# Patient Record
Sex: FEMALE | Race: WHITE | NOT HISPANIC OR LATINO | Employment: OTHER | URBAN - METROPOLITAN AREA
[De-identification: names, ages, dates, MRNs, and addresses within clinical notes are randomized per-mention and may not be internally consistent; named-entity substitution may affect disease eponyms.]

---

## 2017-01-18 ENCOUNTER — TELEPHONE (OUTPATIENT)
Dept: MEDICAL GROUP | Facility: MEDICAL CENTER | Age: 77
End: 2017-01-18

## 2017-01-18 NOTE — TELEPHONE ENCOUNTER
Left message for patient to call back regarding NP pre-visit planning. Please transfer call to [phone].

## 2017-01-25 ENCOUNTER — OFFICE VISIT (OUTPATIENT)
Dept: MEDICAL GROUP | Facility: MEDICAL CENTER | Age: 77
End: 2017-01-25
Payer: MEDICARE

## 2017-01-25 VITALS
DIASTOLIC BLOOD PRESSURE: 82 MMHG | WEIGHT: 191 LBS | RESPIRATION RATE: 16 BRPM | OXYGEN SATURATION: 92 % | BODY MASS INDEX: 32.61 KG/M2 | TEMPERATURE: 97.8 F | HEIGHT: 64 IN | SYSTOLIC BLOOD PRESSURE: 144 MMHG | HEART RATE: 94 BPM

## 2017-01-25 DIAGNOSIS — E78.5 DYSLIPIDEMIA: ICD-10-CM

## 2017-01-25 DIAGNOSIS — E66.09 NON MORBID OBESITY DUE TO EXCESS CALORIES: ICD-10-CM

## 2017-01-25 DIAGNOSIS — F33.9 EPISODE OF RECURRENT MAJOR DEPRESSIVE DISORDER, UNSPECIFIED DEPRESSION EPISODE SEVERITY (HCC): ICD-10-CM

## 2017-01-25 DIAGNOSIS — R42 VERTIGO: ICD-10-CM

## 2017-01-25 DIAGNOSIS — J44.9 CHRONIC OBSTRUCTIVE PULMONARY DISEASE, UNSPECIFIED COPD TYPE (HCC): ICD-10-CM

## 2017-01-25 DIAGNOSIS — E03.9 ACQUIRED HYPOTHYROIDISM: ICD-10-CM

## 2017-01-25 DIAGNOSIS — M25.512 CHRONIC LEFT SHOULDER PAIN: ICD-10-CM

## 2017-01-25 DIAGNOSIS — R73.02 IGT (IMPAIRED GLUCOSE TOLERANCE): ICD-10-CM

## 2017-01-25 DIAGNOSIS — I10 ESSENTIAL HYPERTENSION: ICD-10-CM

## 2017-01-25 DIAGNOSIS — G30.9 ALZHEIMER'S DEMENTIA WITHOUT BEHAVIORAL DISTURBANCE, UNSPECIFIED TIMING OF DEMENTIA ONSET: ICD-10-CM

## 2017-01-25 DIAGNOSIS — G89.29 CHRONIC LEFT SHOULDER PAIN: ICD-10-CM

## 2017-01-25 DIAGNOSIS — F02.80 ALZHEIMER'S DEMENTIA WITHOUT BEHAVIORAL DISTURBANCE, UNSPECIFIED TIMING OF DEMENTIA ONSET: ICD-10-CM

## 2017-01-25 PROCEDURE — 4040F PNEUMOC VAC/ADMIN/RCVD: CPT | Performed by: INTERNAL MEDICINE

## 2017-01-25 PROCEDURE — G8482 FLU IMMUNIZE ORDER/ADMIN: HCPCS | Performed by: INTERNAL MEDICINE

## 2017-01-25 PROCEDURE — G8419 CALC BMI OUT NRM PARAM NOF/U: HCPCS | Performed by: INTERNAL MEDICINE

## 2017-01-25 PROCEDURE — 1101F PT FALLS ASSESS-DOCD LE1/YR: CPT | Mod: 8P | Performed by: INTERNAL MEDICINE

## 2017-01-25 PROCEDURE — 1036F TOBACCO NON-USER: CPT | Performed by: INTERNAL MEDICINE

## 2017-01-25 PROCEDURE — G8432 DEP SCR NOT DOC, RNG: HCPCS | Performed by: INTERNAL MEDICINE

## 2017-01-25 PROCEDURE — 99204 OFFICE O/P NEW MOD 45 MIN: CPT | Performed by: INTERNAL MEDICINE

## 2017-01-25 RX ORDER — FOLIC ACID 1 MG/1
1 TABLET ORAL DAILY
Qty: 90 TAB | Refills: 3 | Status: SHIPPED | OUTPATIENT
Start: 2017-01-25 | End: 2018-02-19 | Stop reason: SDUPTHER

## 2017-01-25 RX ORDER — AMLODIPINE BESYLATE 10 MG/1
10 TABLET ORAL DAILY
Qty: 90 TAB | Refills: 3 | Status: SHIPPED | OUTPATIENT
Start: 2017-01-25 | End: 2017-01-25 | Stop reason: SDUPTHER

## 2017-01-25 RX ORDER — CITALOPRAM 20 MG/1
20 TABLET ORAL DAILY
Qty: 90 TAB | Refills: 3 | Status: SHIPPED | OUTPATIENT
Start: 2017-01-25 | End: 2017-01-25 | Stop reason: SDUPTHER

## 2017-01-25 RX ORDER — METOPROLOL SUCCINATE 100 MG/1
100 TABLET, EXTENDED RELEASE ORAL DAILY
Qty: 90 TAB | Refills: 3 | Status: SHIPPED | OUTPATIENT
Start: 2017-01-25 | End: 2018-01-29 | Stop reason: SDUPTHER

## 2017-01-25 RX ORDER — MECLIZINE HYDROCHLORIDE 25 MG/1
25 TABLET ORAL 3 TIMES DAILY PRN
Qty: 90 TAB | Refills: 0 | Status: ON HOLD | OUTPATIENT
Start: 2017-01-25 | End: 2018-12-19

## 2017-01-25 RX ORDER — BUDESONIDE AND FORMOTEROL FUMARATE DIHYDRATE 160; 4.5 UG/1; UG/1
1 AEROSOL RESPIRATORY (INHALATION) 2 TIMES DAILY
Qty: 1 INHALER | Refills: 6 | Status: SHIPPED | OUTPATIENT
Start: 2017-01-25 | End: 2017-04-03 | Stop reason: SDUPTHER

## 2017-01-25 RX ORDER — LEVOTHYROXINE SODIUM 0.12 MG/1
125 TABLET ORAL
Qty: 90 TAB | Refills: 3 | Status: SHIPPED | OUTPATIENT
Start: 2017-01-25 | End: 2017-01-25 | Stop reason: SDUPTHER

## 2017-01-25 RX ORDER — MECLIZINE HYDROCHLORIDE 25 MG/1
25 TABLET ORAL 3 TIMES DAILY PRN
Qty: 90 TAB | Refills: 0 | Status: SHIPPED | OUTPATIENT
Start: 2017-01-25 | End: 2017-01-25 | Stop reason: SDUPTHER

## 2017-01-25 RX ORDER — ENALAPRIL MALEATE 20 MG/1
20 TABLET ORAL 2 TIMES DAILY
Qty: 180 TAB | Refills: 3 | Status: SHIPPED | OUTPATIENT
Start: 2017-01-25 | End: 2019-08-13

## 2017-01-25 RX ORDER — AMLODIPINE BESYLATE 10 MG/1
10 TABLET ORAL DAILY
Qty: 90 TAB | Refills: 3 | Status: SHIPPED | OUTPATIENT
Start: 2017-01-25 | End: 2018-04-16 | Stop reason: SDUPTHER

## 2017-01-25 RX ORDER — ALBUTEROL SULFATE 2.5 MG/3ML
2.5 SOLUTION RESPIRATORY (INHALATION) EVERY 4 HOURS PRN
Qty: 300 ML | Refills: 6 | Status: SHIPPED | OUTPATIENT
Start: 2017-01-25 | End: 2018-02-19 | Stop reason: SDUPTHER

## 2017-01-25 RX ORDER — ENALAPRIL MALEATE 20 MG/1
20 TABLET ORAL 2 TIMES DAILY
Qty: 180 TAB | Refills: 3 | Status: SHIPPED | OUTPATIENT
Start: 2017-01-25 | End: 2017-01-25 | Stop reason: SDUPTHER

## 2017-01-25 RX ORDER — PRAVASTATIN SODIUM 40 MG
40 TABLET ORAL DAILY
Qty: 90 TAB | Refills: 3 | Status: SHIPPED | OUTPATIENT
Start: 2017-01-25 | End: 2017-01-25 | Stop reason: SDUPTHER

## 2017-01-25 RX ORDER — FOLIC ACID 1 MG/1
1 TABLET ORAL DAILY
Qty: 90 TAB | Refills: 3 | Status: SHIPPED | OUTPATIENT
Start: 2017-01-25 | End: 2017-01-25 | Stop reason: SDUPTHER

## 2017-01-25 RX ORDER — METOPROLOL SUCCINATE 100 MG/1
100 TABLET, EXTENDED RELEASE ORAL DAILY
Qty: 90 TAB | Refills: 3 | Status: SHIPPED | OUTPATIENT
Start: 2017-01-25 | End: 2017-01-25 | Stop reason: SDUPTHER

## 2017-01-25 RX ORDER — PRAVASTATIN SODIUM 40 MG
40 TABLET ORAL DAILY
Qty: 90 TAB | Refills: 3 | Status: SHIPPED | OUTPATIENT
Start: 2017-01-25 | End: 2018-01-22 | Stop reason: SDUPTHER

## 2017-01-25 RX ORDER — LEVOTHYROXINE SODIUM 0.12 MG/1
125 TABLET ORAL
Qty: 90 TAB | Refills: 3 | Status: SHIPPED | OUTPATIENT
Start: 2017-01-25 | End: 2018-02-26 | Stop reason: SDUPTHER

## 2017-01-25 RX ORDER — CITALOPRAM 20 MG/1
20 TABLET ORAL DAILY
Qty: 90 TAB | Refills: 3 | Status: SHIPPED | OUTPATIENT
Start: 2017-01-25 | End: 2018-01-15 | Stop reason: SDUPTHER

## 2017-01-25 NOTE — PROGRESS NOTES
CC: Establishing care multiple medical problems.    HPI:   [place] presents today with the following.    1. Essential hypertension  Presents with a history of hypertension maintain on blood pressure medication slightly elevated in office today. She is denying any chest pain or shortness of breath.    2. Chronic left shoulder pain  She does have chronic left shoulder pain for which she takes anti-inflammatory is but does not report daily pain but she takes medication every day.    3. Alzheimer's dementia without behavioral disturbance, unspecified timing of dementia onset  Reports suffering from Alzheimer's having been followed by neurology in [place] like to establish here. Currently not on medications but some problems with short-term memory.    4. Acquired hypothyroidism  Maintain on thyroid medication requesting refill.    5. Dyslipidemia  Also reports a history of dyslipidemia no recent blood work    6. Vertigo  She does suffer from vertigo taking meclizine when necessary.    7. Episode of recurrent major depressive disorder, unspecified depression episode severity (CMS-HCC)  She reports her mood is somewhat depressed maintain on medication which has been states is been significantly helpful.    8. Chronic obstructive pulmonary disease, unspecified COPD type (CMS-[facility])  History of COPD maintained on inhalers needing refills denying any acute shortness of breath    9. Non morbid obesity due to excess calories  Weight certainly elevated spin this way for some time difficulty with activity.    10. IGT (impaired glucose tolerance)  She reports a history of elevated blood sugars in the past but not truly diabetic.      Patient Active Problem List    Diagnosis Date Noted   • Essential hypertension 01/25/2017   • Chronic left shoulder pain 01/25/2017   • Alzheimer's dementia without behavioral disturbance 01/25/2017   • Acquired hypothyroidism 01/25/2017   • Dyslipidemia 01/25/2017   • Episode of recurrent major  "depressive disorder (CMS-HCC) 01/25/2017   • Chronic obstructive pulmonary disease (CMS-HCC) 01/25/2017   • Non morbid obesity due to excess calories 01/25/2017   • IGT (impaired glucose tolerance) 01/25/2017       Current Outpatient Prescriptions   Medication Sig Dispense Refill   • amlodipine (NORVASC) 10 MG Tab Take 1 Tab by mouth every day. 90 Tab 3   • folic acid (FOLVITE) 1 MG Tab Take 1 Tab by mouth every day. 90 Tab 3   • levothyroxine (SYNTHROID) 125 MCG Tab Take 1 Tab by mouth Every morning on an empty stomach. 90 Tab 3   • pravastatin (PRAVACHOL) 40 MG tablet Take 1 Tab by mouth every day. 90 Tab 3   • metoprolol SR (TOPROL XL) 100 MG TABLET SR 24 HR Take 1 Tab by mouth every day. 90 Tab 3   • citalopram (CELEXA) 20 MG Tab Take 1 Tab by mouth every day. 90 Tab 3   • enalapril (VASOTEC) 20 MG tablet Take 1 Tab by mouth 2 times a day. 180 Tab 3   • meclizine (ANTIVERT) 25 MG Tab Take 1 Tab by mouth 3 times a day as needed. 90 Tab 0   • budesonide-formoterol (SYMBICORT) 160-4.5 MCG/ACT Aerosol Inhale 1 Puff by mouth 2 Times a Day. 1 Inhaler 6   • ipratropium (ATROVENT) 0.02 % Solution 1 mL by Nebulization route 2 times a day. 100 Vial 6   • albuterol (PROVENTIL) 2.5mg/3ml Nebu Soln solution for nebulization 3 mL by Nebulization route every four hours as needed for Shortness of Breath. 300 mL 6   • PROAIR  (90 BASE) MCG/ACT Aero Soln inhalation aerosol Inhale 2 Puffs by mouth every 6 hours as needed for Shortness of Breath. 1 Inhaler 3     No current facility-administered medications for this visit.         Allergies as of 01/25/2017   • (Not on File)        ROS: As per HPI.    /82 mmHg  Pulse 94  Temp(Src) 36.6 °C (97.8 °F)  Resp 16  Ht 1.626 m (5' 4\")  Wt 86.637 kg (191 lb)  BMI 32.77 kg/m2  SpO2 92%    Physical Exam:  Gen:         Alert and oriented, No apparent distress.  Neck:        No Lymphadenopathy or Bruits.  Lungs:     Clear to auscultation bilaterally  CV:          Regular rate " and rhythm. No murmurs, rubs or gallops.               Ext:          No clubbing, cyanosis, edema.      Assessment and Plan.   76 y.o. female with the following issues.    1. Essential hypertension  Currently well controlled, Discuss diet, exercise and salt restriction.    2. Chronic left shoulder pain  Discontinuing meloxicam will see back in the coming weeks to see how much for probable shoulder is.    3. Alzheimer's dementia without behavioral disturbance, unspecified timing of dementia onset  She does have difficulty with word finding and short-term recall referring back to neurology will get previous records  - REFERRAL TO NEUROLOGY    4. Acquired hypothyroidism  Recheck thyroid  - TSH; Future    5. Dyslipidemia  Checking cholesterol  - COMP METABOLIC PANEL; Future  - LIPID PROFILE; Future    6. Vertigo  Refilled meclizine.  - CBC WITH DIFFERENTIAL; Future  - VITAMIN B12; Future  - FOLATE; Future    7. Episode of recurrent major depressive disorder, unspecified depression episode severity (CMS-HCC)  Mood somewhat stable refilled Celexa.    8. Chronic obstructive pulmonary disease, unspecified COPD type (CMS-HCC)  No acute issues will get previous records refilled inhalers.    9. Non morbid obesity due to excess calories  Discussed diet exercise and weight loss strategies. Have set a goal for 15 pounds in 3 months and will followup at that time.  - Patient identified as having weight management issue.  Appropriate orders and counseling given.    10. IGT (impaired glucose tolerance)  Rechecking blood work.  - HEMOGLOBIN A1C; Future

## 2017-01-25 NOTE — MR AVS SNAPSHOT
"        [place] Ernst   2017 9:00 AM   Office Visit   MRN: [id number]    Department:  [address] [place] [address]   Dept Phone:  [phone]    Description:  Female : 1940   Provider:  Orestes Warren M.D.           Reason for Visit     Establish Care COPD      Allergies as of 2017     Not on File      You were diagnosed with     Essential hypertension   [4023658]       Chronic left shoulder pain   [980510]       Alzheimer's dementia without behavioral disturbance, unspecified timing of dementia onset   [5581712]       Acquired hypothyroidism   [0408884]       Dyslipidemia   [560416]       Vertigo   [266270]       Episode of recurrent major depressive disorder, unspecified depression episode severity (CMS-HCC)   [9893064]       Chronic obstructive pulmonary disease, unspecified COPD type (CMS-HCC)   [5090768]       Non morbid obesity due to excess calories   [4303853]       IGT (impaired glucose tolerance)   [102176]         Vital Signs     Blood Pressure Pulse Temperature Respirations Height Weight    144/82 mmHg 94 36.6 °C (97.8 °F) 16 1.626 m (5' 4\") 86.637 kg (191 lb)    Body Mass Index Oxygen Saturation Smoking Status             32.77 kg/m2 92% Never Smoker          Basic Information     Date Of Birth Sex Race Ethnicity Preferred Language    1940 Female Patient Refused Patient Refused English      Your appointments     2017  8:40 AM   Established Patient with Orestes Warren M.D.   [address] ([address])    [address] [place] [id number]   [phone]           You will be receiving a confirmation call a few days before your appointment from our automated call confirmation system.              Problem List              ICD-10-CM Priority Class Noted - Resolved    Essential hypertension I10   2017 - Present    Chronic left shoulder pain M25.512, G89.29   2017 - Present    Alzheimer's dementia without behavioral disturbance G30.9, F02.80   " 1/25/2017 - Present    Acquired hypothyroidism E03.9   1/25/2017 - Present    Dyslipidemia E78.5   1/25/2017 - Present    Episode of recurrent major depressive disorder (CMS-HCC) F33.9   1/25/2017 - Present    Chronic obstructive pulmonary disease (CMS-HCC) J44.9   1/25/2017 - Present    Non morbid obesity due to excess calories E66.09   1/25/2017 - Present    IGT (impaired glucose tolerance) R73.02   1/25/2017 - Present      Health Maintenance        Date Due Completion Dates    IMM DTaP/Tdap/Td Vaccine (1 - Tdap) 5/21/1957 ---    MAMMOGRAM 5/21/1978 ---    IMM ZOSTER VACCINE 5/21/1998 ---    BONE DENSITY 5/21/2003 ---    IMM INFLUENZA (1) 9/1/2016 10/13/2014, 9/14/2013    IMM PNEUMOCOCCAL 65+ (ADULT) LOW/MEDIUM RISK SERIES (2 of 2 - PPSV23) 6/23/2017 6/23/2016            Current Immunizations     13-VALENT PCV PREVNAR 6/23/2016    Influenza Vaccine Adult HD 9/25/2016    Influenza Vaccine Quad Inj (Preserved) 10/13/2014, 9/14/2013      Below and/or attached are the medications your provider expects you to take. Review all of your home medications and newly ordered medications with your provider and/or pharmacist. Follow medication instructions as directed by your provider and/or pharmacist. Please keep your medication list with you and share with your provider. Update the information when medications are discontinued, doses are changed, or new medications (including over-the-counter products) are added; and carry medication information at all times in the event of emergency situations     Allergies:  No Known Allergies          Medications  Valid as of: January 25, 2017 -  9:43 AM    Generic Name Brand Name Tablet Size Instructions for use    Albuterol Sulfate (Nebu Soln) PROVENTIL 2.5mg/3ml 3 mL by Nebulization route every four hours as needed for Shortness of Breath.        Albuterol Sulfate (Aero Soln) PROAIR  (90 BASE) MCG/ACT Inhale 2 Puffs by mouth every 6 hours as needed for Shortness of Breath.           AmLODIPine Besylate (Tab) NORVASC 10 MG Take 1 Tab by mouth every day.        Budesonide-Formoterol Fumarate (Aerosol) SYMBICORT 160-4.5 MCG/ACT Inhale 1 Puff by mouth 2 Times a Day.        Citalopram Hydrobromide (Tab) CELEXA 20 MG Take 1 Tab by mouth every day.        Enalapril Maleate (Tab) VASOTEC 20 MG Take 1 Tab by mouth 2 times a day.        Folic Acid (Tab) FOLVITE 1 MG Take 1 Tab by mouth every day.        Ipratropium Bromide (Solution) ATROVENT 0.02 % 1 mL by Nebulization route 2 times a day.        Levothyroxine Sodium (Tab) SYNTHROID 125 MCG Take 1 Tab by mouth Every morning on an empty stomach.        Meclizine HCl (Tab) ANTIVERT 25 MG Take 1 Tab by mouth 3 times a day as needed.        Metoprolol Succinate (TABLET SR 24 HR) TOPROL  MG Take 1 Tab by mouth every day.        Pravastatin Sodium (Tab) PRAVACHOL 40 MG Take 1 Tab by mouth every day.        .                 Medicines prescribed today were sent to:     [facility] [address] [place] [id number]    Phone: [phone] Fax: [phone]    Open 24 Hours?: No      Medication refill instructions:       If your prescription bottle indicates you have medication refills left, it is not necessary to call your provider’s office. Please contact your pharmacy and they will refill your medication.    If your prescription bottle indicates you do not have any refills left, you may request refills at any time through one of the following ways: The online [employer] system (except Urgent Care), by calling your provider’s office, or by asking your pharmacy to contact your provider’s office with a refill request. Medication refills are processed only during regular business hours and may not be available until the next business day. Your provider may request additional information or to have a follow-up visit with you prior to refilling your medication.   *Please Note: Medication refills are  assigned a new Rx number when refilled electronically. Your pharmacy may indicate that no refills were authorized even though a new prescription for the same medication is available at the pharmacy. Please request the medicine by name with the pharmacy before contacting your provider for a refill.        Your To Do List     Future Labs/Procedures Complete By Expires    CBC WITH DIFFERENTIAL  As directed 1/26/2018    COMP METABOLIC PANEL  As directed 1/26/2018    FOLATE  As directed 1/26/2018    HEMOGLOBIN A1C  As directed 1/26/2018    LIPID PROFILE  As directed 1/26/2018    TSH  As directed 1/26/2018    VITAMIN B12  As directed 1/26/2018      Referral     A referral request has been sent to our patient care coordination department. Please allow 3-5 business days for us to process this request and contact you either by phone or mail. If you do not hear from us by the 5th business day, please call us at [phone].           MyChart Status: Patient Declined

## 2017-01-25 NOTE — TELEPHONE ENCOUNTER
Was the patient seen in the last year in this department? Yes     Does patient have an active prescription for medications requested? Yes     Received Request Via: Pharmacy     Per pharmacy, last rx was written incorrectly. It should be 3ml not 1 ml. Each vial is 3ml.

## 2017-02-08 ENCOUNTER — HOSPITAL ENCOUNTER (OUTPATIENT)
Dept: LAB | Facility: MEDICAL CENTER | Age: 77
End: 2017-02-08
Attending: INTERNAL MEDICINE
Payer: MEDICARE

## 2017-02-08 ENCOUNTER — OFFICE VISIT (OUTPATIENT)
Dept: MEDICAL GROUP | Facility: MEDICAL CENTER | Age: 77
End: 2017-02-08
Payer: MEDICARE

## 2017-02-08 VITALS
WEIGHT: 188 LBS | TEMPERATURE: 97.8 F | HEART RATE: 72 BPM | SYSTOLIC BLOOD PRESSURE: 124 MMHG | OXYGEN SATURATION: 92 % | BODY MASS INDEX: 32.1 KG/M2 | RESPIRATION RATE: 16 BRPM | HEIGHT: 64 IN | DIASTOLIC BLOOD PRESSURE: 72 MMHG

## 2017-02-08 DIAGNOSIS — E66.09 NON MORBID OBESITY DUE TO EXCESS CALORIES: ICD-10-CM

## 2017-02-08 DIAGNOSIS — Z78.0 POST-MENOPAUSAL: ICD-10-CM

## 2017-02-08 DIAGNOSIS — F33.9 EPISODE OF RECURRENT MAJOR DEPRESSIVE DISORDER, UNSPECIFIED DEPRESSION EPISODE SEVERITY (HCC): ICD-10-CM

## 2017-02-08 DIAGNOSIS — R42 VERTIGO: ICD-10-CM

## 2017-02-08 DIAGNOSIS — E03.9 ACQUIRED HYPOTHYROIDISM: ICD-10-CM

## 2017-02-08 DIAGNOSIS — R73.02 IGT (IMPAIRED GLUCOSE TOLERANCE): ICD-10-CM

## 2017-02-08 DIAGNOSIS — I10 ESSENTIAL HYPERTENSION: ICD-10-CM

## 2017-02-08 DIAGNOSIS — E78.5 DYSLIPIDEMIA: ICD-10-CM

## 2017-02-08 DIAGNOSIS — Z00.00 MEDICARE ANNUAL WELLNESS VISIT, SUBSEQUENT: ICD-10-CM

## 2017-02-08 DIAGNOSIS — J44.9 CHRONIC OBSTRUCTIVE PULMONARY DISEASE, UNSPECIFIED COPD TYPE (HCC): ICD-10-CM

## 2017-02-08 DIAGNOSIS — G89.29 CHRONIC LEFT SHOULDER PAIN: ICD-10-CM

## 2017-02-08 DIAGNOSIS — G30.9 ALZHEIMER'S DEMENTIA WITHOUT BEHAVIORAL DISTURBANCE, UNSPECIFIED TIMING OF DEMENTIA ONSET: ICD-10-CM

## 2017-02-08 DIAGNOSIS — F02.80 ALZHEIMER'S DEMENTIA WITHOUT BEHAVIORAL DISTURBANCE, UNSPECIFIED TIMING OF DEMENTIA ONSET: ICD-10-CM

## 2017-02-08 DIAGNOSIS — M25.512 CHRONIC LEFT SHOULDER PAIN: ICD-10-CM

## 2017-02-08 LAB
ALBUMIN SERPL BCP-MCNC: 4.1 G/DL (ref 3.2–4.9)
ALBUMIN/GLOB SERPL: 1.5 G/DL
ALP SERPL-CCNC: 100 U/L (ref 30–99)
ALT SERPL-CCNC: 15 U/L (ref 2–50)
ANION GAP SERPL CALC-SCNC: 6 MMOL/L (ref 0–11.9)
AST SERPL-CCNC: 20 U/L (ref 12–45)
BASOPHILS # BLD AUTO: 0.09 K/UL (ref 0–0.12)
BASOPHILS NFR BLD AUTO: 0.8 % (ref 0–1.8)
BILIRUB SERPL-MCNC: 0.5 MG/DL (ref 0.1–1.5)
BUN SERPL-MCNC: 25 MG/DL (ref 8–22)
CALCIUM SERPL-MCNC: 9.4 MG/DL (ref 8.5–10.5)
CHLORIDE SERPL-SCNC: 104 MMOL/L (ref 96–112)
CHOLEST SERPL-MCNC: 127 MG/DL (ref 100–199)
CO2 SERPL-SCNC: 29 MMOL/L (ref 20–33)
CREAT SERPL-MCNC: 0.88 MG/DL (ref 0.5–1.4)
EOSINOPHIL # BLD: 0.57 K/UL (ref 0–0.51)
EOSINOPHIL NFR BLD AUTO: 5.2 % (ref 0–6.9)
ERYTHROCYTE [DISTWIDTH] IN BLOOD BY AUTOMATED COUNT: 43.1 FL (ref 35.9–50)
EST. AVERAGE GLUCOSE BLD GHB EST-MCNC: 108 MG/DL
FOLATE SERPL-MCNC: >23.6 NG/ML
GLOBULIN SER CALC-MCNC: 2.8 G/DL (ref 1.9–3.5)
GLUCOSE SERPL-MCNC: 101 MG/DL (ref 65–99)
HBA1C MFR BLD: 5.4 % (ref 0–5.6)
HCT VFR BLD AUTO: 44.8 % (ref 37–47)
HDLC SERPL-MCNC: 52 MG/DL
HGB BLD-MCNC: 14.1 G/DL (ref 12–16)
IMM GRANULOCYTES # BLD AUTO: 0.06 K/UL (ref 0–0.11)
IMM GRANULOCYTES NFR BLD AUTO: 0.5 % (ref 0–0.9)
LDLC SERPL CALC-MCNC: 63 MG/DL
LYMPHOCYTES # BLD: 2.3 K/UL (ref 1–4.8)
LYMPHOCYTES NFR BLD AUTO: 21 % (ref 22–41)
MCH RBC QN AUTO: 30.7 PG (ref 27–33)
MCHC RBC AUTO-ENTMCNC: 31.5 G/DL (ref 33.6–35)
MCV RBC AUTO: 97.4 FL (ref 81.4–97.8)
MONOCYTES # BLD: 0.95 K/UL (ref 0–0.85)
MONOCYTES NFR BLD AUTO: 8.7 % (ref 0–13.4)
NEUTROPHILS # BLD: 7 K/UL (ref 2–7.15)
NEUTROPHILS NFR BLD AUTO: 63.8 % (ref 44–72)
NRBC # BLD AUTO: 0 K/UL
NRBC BLD-RTO: 0 /100 WBC
PLATELET # BLD AUTO: 272 K/UL (ref 164–446)
PMV BLD AUTO: 11.5 FL (ref 9–12.9)
POTASSIUM SERPL-SCNC: 4.1 MMOL/L (ref 3.6–5.5)
PROT SERPL-MCNC: 6.9 G/DL (ref 6–8.2)
RBC # BLD AUTO: 4.6 M/UL (ref 4.2–5.4)
SODIUM SERPL-SCNC: 139 MMOL/L (ref 135–145)
TRIGL SERPL-MCNC: 62 MG/DL (ref 0–149)
TSH SERPL DL<=0.005 MIU/L-ACNC: 0.89 UIU/ML (ref 0.3–3.7)
VIT B12 SERPL-MCNC: 271 PG/ML (ref 211–911)
WBC # BLD AUTO: 11 K/UL (ref 4.8–10.8)

## 2017-02-08 PROCEDURE — 80061 LIPID PANEL: CPT

## 2017-02-08 PROCEDURE — 99213 OFFICE O/P EST LOW 20 MIN: CPT | Mod: 25 | Performed by: INTERNAL MEDICINE

## 2017-02-08 PROCEDURE — 1036F TOBACCO NON-USER: CPT | Performed by: INTERNAL MEDICINE

## 2017-02-08 PROCEDURE — 80053 COMPREHEN METABOLIC PANEL: CPT

## 2017-02-08 PROCEDURE — 84443 ASSAY THYROID STIM HORMONE: CPT

## 2017-02-08 PROCEDURE — 1101F PT FALLS ASSESS-DOCD LE1/YR: CPT | Performed by: INTERNAL MEDICINE

## 2017-02-08 PROCEDURE — G0439 PPPS, SUBSEQ VISIT: HCPCS | Mod: 25 | Performed by: INTERNAL MEDICINE

## 2017-02-08 PROCEDURE — G8419 CALC BMI OUT NRM PARAM NOF/U: HCPCS | Performed by: INTERNAL MEDICINE

## 2017-02-08 PROCEDURE — G8510 SCR DEP NEG, NO PLAN REQD: HCPCS | Performed by: INTERNAL MEDICINE

## 2017-02-08 PROCEDURE — G8482 FLU IMMUNIZE ORDER/ADMIN: HCPCS | Performed by: INTERNAL MEDICINE

## 2017-02-08 PROCEDURE — 36415 COLL VENOUS BLD VENIPUNCTURE: CPT

## 2017-02-08 PROCEDURE — 85025 COMPLETE CBC W/AUTO DIFF WBC: CPT

## 2017-02-08 PROCEDURE — 82607 VITAMIN B-12: CPT

## 2017-02-08 PROCEDURE — 83036 HEMOGLOBIN GLYCOSYLATED A1C: CPT

## 2017-02-08 PROCEDURE — 4040F PNEUMOC VAC/ADMIN/RCVD: CPT | Performed by: INTERNAL MEDICINE

## 2017-02-08 PROCEDURE — 82746 ASSAY OF FOLIC ACID SERUM: CPT

## 2017-02-08 ASSESSMENT — PATIENT HEALTH QUESTIONNAIRE - PHQ9: CLINICAL INTERPRETATION OF PHQ2 SCORE: 0

## 2017-02-08 NOTE — PROGRESS NOTES
CC: Follow-up dizziness to wellness examination    HPI:   [place] presents today with the following.    1. Medicare annual wellness visit, subsequent  Screenings performed below.    2. Essential hypertension  She was refilled on her medications at last visit blood pressure is improved today.    3. Chronic left shoulder pain  Discussion shoulder at last visit no changes in nature she reports it is tolerable.    4. Alzheimer's dementia without behavioral disturbance, unspecified timing of dementia onset  She does have a diagnosis of Alzheimer's from her presentation is . Memory testing today does show one out of 3 recall. She is scheduled neurology later next month.    5. Acquired hypothyroidism  Maintain on thyroid medication was given a lab slip however did not complete as of yet.    6. Dyslipidemia  Also history of dyslipidemia unknown where she is at currently.    7. Episode of recurrent major depressive disorder, unspecified depression episode severity (CMS-[facility])  Mood clinically doing well as demonstrated by screening tests maintain on medication without side effect.    8. Chronic obstructive pulmonary disease, unspecified COPD type (CMS-HCC)  Breathing is at baseline she was rewritten for inhalers last visit.    9. Non morbid obesity due to excess calories  Weight has gone down slightly last visit we did discuss weight loss strategies.    10. IGT (impaired glucose tolerance)  Still awaiting blood work to check blood sugars.    11. Vertigo  Biggest complaint is persistent vertigo. She was complaining at last visit but not a lot of time to discuss. She describes a spinning sensation when she rolls over in bed. She has seen neurology in the past and it sounds as if was given exercises at home to do however she did not do them diligently. She is not having chest pain or palpitations associated again laying on her left side and rolling over his biggest event causing symptoms.    12. Post-menopausal  Due for bone  density testing.      Depression Screening    Little interest or pleasure in doing things?  0 - not at all  Feeling down, depressed , or hopeless? 0 - not at all  Trouble falling or staying asleep, or sleeping too much?     Feeling tired or having little energy?     Poor appetite or overeating?     Feeling bad about yourself - or that you are a failure or have let yourself or your family down?    Trouble concentrating on things, such as reading the newspaper or watching television?    Moving or speaking so slowly that other people could have noticed.  Or the opposite - being so fidgety or restless that you have been moving around a lot more than usual?     Thoughts that you would be better off dead, or of hurting yourself?     Patient Health Questionnaire Score:      If depressive symptoms identified deferred to follow up visit unless specifically addressed in assesment and plan.      Screening for Cognitive Impairment    Three Minute Recall (banana, sunrise, fence)  1/3    Draw clock face with all 12 numbers set to the hand to show 10 minures past 11 o'clock  1 5/5  Cognitive concerns identified defferred for follow up unless specifically addressed in assesment and plan.    Fall Risk Assessment    Has the patient had two or more falls in the last year or any fall with injury in the last year?  No    Safety Assessment    Throw rugs on floor.  No  Handrails on all stairs.  No  Good lighting in all hallways.  Yes  Difficulty hearing.  No  Patient counseled about all safety risks that were identified.    Functional Assessment ADLs    Are there any barriers preventing you from cooking for yourself or meeting nutritional needs?  No.    Are there any barriers preventing you from driving safely or obtaining transportation?  No.    Are there any barriers preventing you from using a telephone or calling for help?  No.    Are there any barriers preventing you from shopping?  No.    Are there any barriers preventing you from  taking care of your own finances?  No.    Are there any barriers preventing you from managing your medications?  No.    Are currently engaing any exercise or physical activity?  Yes.       Health Maintenance Summary                Annual Wellness Visit Overdue 1940     PFT SCREENING-FEV1 AND FEV/FVC RATIO / SPIROMETRY SHOULD BE PERFORMED ANNUALLY Overdue 5/21/1956     IMM DTaP/Tdap/Td Vaccine Overdue 5/21/1957     IMM ZOSTER VACCINE Overdue 5/21/1998     BONE DENSITY Overdue 5/21/2003     IMM PNEUMOCOCCAL 65+ (ADULT) LOW/MEDIUM RISK SERIES Next Due 6/23/2017      Done 6/23/2016 Imm Admin: Pneumococcal Conjugate Vaccine (Prevnar/PCV-13)          Patient Care Team:  Orestes Warren M.D. as PCP - General (Internal Medicine)      Patient Active Problem List    Diagnosis Date Noted   • Alzheimer's dementia without behavioral disturbance 02/08/2017   • Essential hypertension 01/25/2017   • Chronic left shoulder pain 01/25/2017   • Acquired hypothyroidism 01/25/2017   • Dyslipidemia 01/25/2017   • Episode of recurrent major depressive disorder (CMS-HCC) 01/25/2017   • Chronic obstructive pulmonary disease (CMS-HCC) 01/25/2017   • Non morbid obesity due to excess calories 01/25/2017   • IGT (impaired glucose tolerance) 01/25/2017       Current Outpatient Prescriptions   Medication Sig Dispense Refill   • amlodipine (NORVASC) 10 MG Tab Take 1 Tab by mouth every day. 90 Tab 3   • folic acid (FOLVITE) 1 MG Tab Take 1 Tab by mouth every day. 90 Tab 3   • levothyroxine (SYNTHROID) 125 MCG Tab Take 1 Tab by mouth Every morning on an empty stomach. 90 Tab 3   • pravastatin (PRAVACHOL) 40 MG tablet Take 1 Tab by mouth every day. 90 Tab 3   • metoprolol SR (TOPROL XL) 100 MG TABLET SR 24 HR Take 1 Tab by mouth every day. 90 Tab 3   • citalopram (CELEXA) 20 MG Tab Take 1 Tab by mouth every day. 90 Tab 3   • enalapril (VASOTEC) 20 MG tablet Take 1 Tab by mouth 2 times a day. 180 Tab 3   • meclizine (ANTIVERT) 25 MG Tab Take 1 Tab  "by mouth 3 times a day as needed. 90 Tab 0   • budesonide-formoterol (SYMBICORT) 160-4.5 MCG/ACT Aerosol Inhale 1 Puff by mouth 2 Times a Day. 1 Inhaler 6   • albuterol (PROVENTIL) 2.5mg/3ml Nebu Soln solution for nebulization 3 mL by Nebulization route every four hours as needed for Shortness of Breath. 300 mL 6   • PROAIR  (90 BASE) MCG/ACT Aero Soln inhalation aerosol Inhale 2 Puffs by mouth every 6 hours as needed for Shortness of Breath. 1 Inhaler 3   • ipratropium (ATROVENT) 0.02 % Solution 3 mL by Nebulization route 2 times a day. 100 Vial 6     No current facility-administered medications for this visit.         Allergies as of 02/08/2017   • (Not on File)        ROS: As per HPI.    /72 mmHg  Pulse 72  Temp(Src) 36.6 °C (97.8 °F)  Resp 16  Ht 1.626 m (5' 4\")  Wt 85.276 kg (188 lb)  BMI 32.25 kg/m2  SpO2 92%    Physical Exam:  Gen:         Alert and oriented, No apparent distress.  Neck:        No Lymphadenopathy or Bruits.  Lungs:     Clear to auscultation bilaterally  CV:          Regular rate and rhythm. No murmurs, rubs or gallops.  Abd:         Soft non tender, non distended. Normal active bowel sounds.  No  Hepatosplenomegaly, No pulsatile masses.                   Ext:          No clubbing, cyanosis, edema.      Assessment and Plan.   76 y.o. female with the following issues.    1. Medicare annual wellness visit, subsequent  Discussed healthy lifestyle habits as well as screening regimens. Info given on advanced directives. She is sided disc forego cancer screenings.  - Annual Wellness Visit - Includes PPPS Subsequent ()    2. Essential hypertension  Currently well controlled, Discuss diet, exercise and salt restriction.  - Annual Wellness Visit - Includes PPPS Subsequent ()    3. Chronic left shoulder pain  Clinically stable no change symptoms.  - Annual Wellness Visit - Includes PPPS Subsequent ()    4. Alzheimer's dementia without behavioral disturbance, " unspecified timing of dementia onset  She has been referred to neurology will follow along.  - Annual Wellness Visit - Includes PPPS Subsequent ()    5. Acquired hypothyroidism  Encouraged her to get her labs done.  - Annual Wellness Visit - Includes PPPS Subsequent ()    6. Dyslipidemia  Encouraged to get labs done.  - Annual Wellness Visit - Includes PPPS Subsequent ()    7. Episode of recurrent major depressive disorder, unspecified depression episode severity (CMS-HCC)  Clinically stable no change to therapy  - Annual Wellness Visit - Includes PPPS Subsequent ()    8. Chronic obstructive pulmonary disease, unspecified COPD type (CMS-HCC)  Clinically stable no change to therapy  - Annual Wellness Visit - Includes PPPS Subsequent ()    9. Non morbid obesity due to excess calories  Continue weight loss efforts.  - Annual Wellness Visit - Includes PPPS Subsequent ()    10. IGT (impaired glucose tolerance)  Encouraged to get blood work done.  - Annual Wellness Visit - Includes PPPS Subsequent ()    11. Vertigo  Discussion again today she can discuss with neurology and awaiting previous records. Have placed referral to physical therapy for vestibular rehabilitation.  - REFERRAL TO PHYSICAL THERAPY Reason for Therapy: Eval/Treat/Report    12. Post-menopausal  Bone density testing.  - DS-BONE DENSITY STUDY (DEXA); Future

## 2017-02-08 NOTE — MR AVS SNAPSHOT
"        Florida Klever   2017 8:40 AM   Office Visit   MRN: [id number]    Department:  [address] [place] [address]   Dept Phone:  [phone]    Description:  Female : 1940   Provider:  Orestes Warren M.D.           Reason for Visit     Annual Exam           Allergies as of 2017     Not on File      You were diagnosed with     Medicare annual wellness visit, subsequent   [732811]       Essential hypertension   [2403969]       Chronic left shoulder pain   [554530]       Alzheimer's dementia without behavioral disturbance, unspecified timing of dementia onset   [0482532]       Acquired hypothyroidism   [7837091]       Dyslipidemia   [161254]       Episode of recurrent major depressive disorder, unspecified depression episode severity (CMS-HCC)   [4717512]       Chronic obstructive pulmonary disease, unspecified COPD type (CMS-HCC)   [7890298]       Non morbid obesity due to excess calories   [8038107]       IGT (impaired glucose tolerance)   [261470]       Vertigo   [128593]       Post-menopausal   [334012]         Vital Signs     Blood Pressure Pulse Temperature Respirations Height Weight    124/72 mmHg 72 36.6 °C (97.8 °F) 16 1.626 m (5' 4\") 85.276 kg (188 lb)    Body Mass Index Oxygen Saturation Smoking Status             32.25 kg/m2 92% Never Smoker          Basic Information     Date Of Birth Sex Race Ethnicity Preferred Language    1940 Female Patient Refused Patient Refused English      Your appointments     2017  8:40 AM   New Patient with Tom Guerra M.D.   [facility] (--)    [address] [place] [address], [address]  [facility] [address] [phone]           Please bring Photo ID, Insurance Cards, All Medication Bottles and copies of any legal documents (such as Living Will, Power of ) If speaking a language besides English please bring an adult . Please arrive 30 minutes prior for check in and registration. You will be receiving a confirmation call a few days " before your appointment from our automated call confirmation system.              Problem List              ICD-10-CM Priority Class Noted - Resolved    Essential hypertension I10   1/25/2017 - Present    Chronic left shoulder pain M25.512, G89.29   1/25/2017 - Present    Acquired hypothyroidism E03.9   1/25/2017 - Present    Dyslipidemia E78.5   1/25/2017 - Present    Episode of recurrent major depressive disorder (CMS-HCC) F33.9   1/25/2017 - Present    Chronic obstructive pulmonary disease (CMS-HCC) J44.9   1/25/2017 - Present    Non morbid obesity due to excess calories E66.09   1/25/2017 - Present    IGT (impaired glucose tolerance) R73.02   1/25/2017 - Present    Alzheimer's dementia without behavioral disturbance G30.9, F02.80   2/8/2017 - Present      Health Maintenance        Date Due Completion Dates    IMM DTaP/Tdap/Td Vaccine (1 - Tdap) 5/21/1957 ---    IMM ZOSTER VACCINE 5/21/1998 ---    BONE DENSITY 5/21/2003 ---    IMM PNEUMOCOCCAL 65+ (ADULT) LOW/MEDIUM RISK SERIES (2 of 2 - PPSV23) 6/23/2017 6/23/2016            Current Immunizations     13-VALENT PCV PREVNAR 6/23/2016    Influenza Vaccine Adult HD 9/25/2016    Influenza Vaccine Quad Inj (Preserved) 10/13/2014, 9/14/2013      Below and/or attached are the medications your provider expects you to take. Review all of your home medications and newly ordered medications with your provider and/or pharmacist. Follow medication instructions as directed by your provider and/or pharmacist. Please keep your medication list with you and share with your provider. Update the information when medications are discontinued, doses are changed, or new medications (including over-the-counter products) are added; and carry medication information at all times in the event of emergency situations     Allergies:  No Known Allergies          Medications  Valid as of: February 08, 2017 -  9:04 AM    Generic Name Brand Name Tablet Size Instructions for use    Albuterol  Sulfate (Nebu Soln) PROVENTIL 2.5mg/3ml 3 mL by Nebulization route every four hours as needed for Shortness of Breath.        Albuterol Sulfate (Aero Soln) PROAIR  (90 BASE) MCG/ACT Inhale 2 Puffs by mouth every 6 hours as needed for Shortness of Breath.        AmLODIPine Besylate (Tab) NORVASC 10 MG Take 1 Tab by mouth every day.        Budesonide-Formoterol Fumarate (Aerosol) SYMBICORT 160-4.5 MCG/ACT Inhale 1 Puff by mouth 2 Times a Day.        Citalopram Hydrobromide (Tab) CELEXA 20 MG Take 1 Tab by mouth every day.        Enalapril Maleate (Tab) VASOTEC 20 MG Take 1 Tab by mouth 2 times a day.        Folic Acid (Tab) FOLVITE 1 MG Take 1 Tab by mouth every day.        Ipratropium Bromide (Solution) ATROVENT 0.02 % 3 mL by Nebulization route 2 times a day.        Levothyroxine Sodium (Tab) SYNTHROID 125 MCG Take 1 Tab by mouth Every morning on an empty stomach.        Meclizine HCl (Tab) ANTIVERT 25 MG Take 1 Tab by mouth 3 times a day as needed.        Metoprolol Succinate (TABLET SR 24 HR) TOPROL  MG Take 1 Tab by mouth every day.        Pravastatin Sodium (Tab) PRAVACHOL 40 MG Take 1 Tab by mouth every day.        .                 Medicines prescribed today were sent to:     [facility] [address] [place] [address]    Phone: [phone] Fax: [phone]    Open 24 Hours?: No      Medication refill instructions:       If your prescription bottle indicates you have medication refills left, it is not necessary to call your provider’s office. Please contact your pharmacy and they will refill your medication.    If your prescription bottle indicates you do not have any refills left, you may request refills at any time through one of the following ways: The online [employer] system (except Urgent Care), by calling your provider’s office, or by asking your pharmacy to contact your provider’s office with a refill request. Medication refills are  processed only during regular business hours and may not be available until the next business day. Your provider may request additional information or to have a follow-up visit with you prior to refilling your medication.   *Please Note: Medication refills are assigned a new Rx number when refilled electronically. Your pharmacy may indicate that no refills were authorized even though a new prescription for the same medication is available at the pharmacy. Please request the medicine by name with the pharmacy before contacting your provider for a refill.        Your To Do List     Future Labs/Procedures Complete By Expires    DS-BONE DENSITY STUDY (DEXA)  As directed 8/11/2017      Referral     A referral request has been sent to our patient care coordination department. Please allow 3-5 business days for us to process this request and contact you either by phone or mail. If you do not hear from us by the 5th business day, please call us at [phone].           MyChart Status: Patient Declined

## 2017-02-22 ENCOUNTER — HOSPITAL ENCOUNTER (OUTPATIENT)
Dept: LAB | Facility: MEDICAL CENTER | Age: 77
End: 2017-02-22
Attending: NURSE PRACTITIONER
Payer: MEDICARE

## 2017-02-22 ENCOUNTER — OFFICE VISIT (OUTPATIENT)
Dept: MEDICAL GROUP | Facility: MEDICAL CENTER | Age: 77
End: 2017-02-22
Payer: MEDICARE

## 2017-02-22 ENCOUNTER — HOSPITAL ENCOUNTER (OUTPATIENT)
Facility: MEDICAL CENTER | Age: 77
End: 2017-02-22
Attending: NURSE PRACTITIONER
Payer: MEDICARE

## 2017-02-22 VITALS
BODY MASS INDEX: 32.27 KG/M2 | HEART RATE: 74 BPM | DIASTOLIC BLOOD PRESSURE: 70 MMHG | OXYGEN SATURATION: 97 % | RESPIRATION RATE: 16 BRPM | WEIGHT: 189 LBS | TEMPERATURE: 97.4 F | HEIGHT: 64 IN | SYSTOLIC BLOOD PRESSURE: 126 MMHG

## 2017-02-22 DIAGNOSIS — I10 ESSENTIAL HYPERTENSION: ICD-10-CM

## 2017-02-22 DIAGNOSIS — M25.50 ARTHRALGIA, UNSPECIFIED JOINT: ICD-10-CM

## 2017-02-22 DIAGNOSIS — G30.9 ALZHEIMER'S DEMENTIA WITHOUT BEHAVIORAL DISTURBANCE, UNSPECIFIED TIMING OF DEMENTIA ONSET: ICD-10-CM

## 2017-02-22 DIAGNOSIS — F02.80 ALZHEIMER'S DEMENTIA WITHOUT BEHAVIORAL DISTURBANCE, UNSPECIFIED TIMING OF DEMENTIA ONSET: ICD-10-CM

## 2017-02-22 DIAGNOSIS — E78.5 DYSLIPIDEMIA: ICD-10-CM

## 2017-02-22 DIAGNOSIS — N39.46 MIXED INCONTINENCE: ICD-10-CM

## 2017-02-22 DIAGNOSIS — R73.02 IGT (IMPAIRED GLUCOSE TOLERANCE): ICD-10-CM

## 2017-02-22 LAB
AMORPHOUS CRYSTALS 1764: PRESENT /HPF
APPEARANCE UR: ABNORMAL
APPEARANCE UR: NORMAL
BACTERIA #/AREA URNS HPF: ABNORMAL /HPF
BILIRUB UR QL STRIP.AUTO: ABNORMAL
BILIRUB UR STRIP-MCNC: NORMAL MG/DL
CK SERPL-CCNC: 161 U/L (ref 0–154)
COLOR UR AUTO: NORMAL
COLOR UR AUTO: YELLOW
CULTURE IF INDICATED INDCX: YES UA CULTURE
EPITHELIAL CELLS 1715: ABNORMAL /HPF
ERYTHROCYTE [SEDIMENTATION RATE] IN BLOOD BY WESTERGREN METHOD: 54 MM/HOUR (ref 0–30)
GLUCOSE UR STRIP.AUTO-MCNC: NEGATIVE MG/DL
GLUCOSE UR STRIP.AUTO-MCNC: NORMAL MG/DL
KETONES UR STRIP.AUTO-MCNC: NEGATIVE MG/DL
KETONES UR STRIP.AUTO-MCNC: NORMAL MG/DL
LEUKOCYTE ESTERASE UR QL STRIP.AUTO: ABNORMAL
LEUKOCYTE ESTERASE UR QL STRIP.AUTO: NORMAL
MICRO URNS: ABNORMAL
NITRITE UR QL STRIP.AUTO: NEGATIVE
NITRITE UR QL STRIP.AUTO: NORMAL
PH UR STRIP.AUTO: 5 [PH] (ref 5–8)
PH UR: 6 [PH]
PROT UR QL STRIP: 30 MG/DL
PROT UR QL STRIP: >=300 MG/DL
RBC #/AREA URNS HPF: ABNORMAL /HPF
RBC UR QL AUTO: ABNORMAL
RBC UR QL AUTO: NORMAL
RHEUMATOID FACT SERPL-ACNC: <10 IU/ML (ref 0–14)
SP GR UR STRIP.AUTO: 1.01
SP GR UR STRIP.AUTO: 1.02
UROBILINOGEN UR STRIP-MCNC: 1 MG/DL
WBC #/AREA URNS HPF: ABNORMAL /HPF

## 2017-02-22 PROCEDURE — 1101F PT FALLS ASSESS-DOCD LE1/YR: CPT | Performed by: NURSE PRACTITIONER

## 2017-02-22 PROCEDURE — G8482 FLU IMMUNIZE ORDER/ADMIN: HCPCS | Performed by: NURSE PRACTITIONER

## 2017-02-22 PROCEDURE — 4040F PNEUMOC VAC/ADMIN/RCVD: CPT | Performed by: NURSE PRACTITIONER

## 2017-02-22 PROCEDURE — G8432 DEP SCR NOT DOC, RNG: HCPCS | Performed by: NURSE PRACTITIONER

## 2017-02-22 PROCEDURE — 87086 URINE CULTURE/COLONY COUNT: CPT | Mod: 91

## 2017-02-22 PROCEDURE — 1036F TOBACCO NON-USER: CPT | Performed by: NURSE PRACTITIONER

## 2017-02-22 PROCEDURE — 81002 URINALYSIS NONAUTO W/O SCOPE: CPT | Performed by: NURSE PRACTITIONER

## 2017-02-22 PROCEDURE — 81001 URINALYSIS AUTO W/SCOPE: CPT | Mod: 91

## 2017-02-22 PROCEDURE — 99214 OFFICE O/P EST MOD 30 MIN: CPT | Performed by: NURSE PRACTITIONER

## 2017-02-22 PROCEDURE — G8419 CALC BMI OUT NRM PARAM NOF/U: HCPCS | Performed by: NURSE PRACTITIONER

## 2017-02-22 RX ORDER — ACETAMINOPHEN 500 MG
1000 TABLET ORAL EVERY 4 HOURS PRN
Qty: 30 TAB | Refills: 0 | Status: ON HOLD | COMMUNITY
Start: 2017-02-22 | End: 2024-03-08

## 2017-02-22 RX ORDER — SULFAMETHOXAZOLE AND TRIMETHOPRIM 800; 160 MG/1; MG/1
1 TABLET ORAL 2 TIMES DAILY
Qty: 10 TAB | Refills: 0 | Status: SHIPPED | OUTPATIENT
Start: 2017-02-22 | End: 2017-02-27

## 2017-02-22 ASSESSMENT — ENCOUNTER SYMPTOMS
NECK PAIN: 1
PAIN: 1
MEMORY LOSS: 1

## 2017-02-22 NOTE — MR AVS SNAPSHOT
"        Florida Ernst   2017 2:40 PM   Office Visit   MRN: [id number]    Department:  [address] [place] [address]   Dept Phone:  [phone]    Description:  Female : 1940   Provider:  NAVYA Anderson           Reason for Visit     Pain neck, hands    Other confusion lately x 3 weeks      Allergies as of 2017     Not on File      You were diagnosed with     Alzheimer's dementia without behavioral disturbance, unspecified timing of dementia onset   [0318409]       Arthralgia, unspecified joint   [8862858]       Essential hypertension   [1255063]       Dyslipidemia   [497966]       IGT (impaired glucose tolerance)   [601381]       Mixed incontinence   [534139]         Vital Signs     Blood Pressure Pulse Temperature Respirations Height Weight    126/70 mmHg 74 36.3 °C (97.4 °F) 16 1.626 m (5' 4.02\") 85.73 kg (189 lb)    Body Mass Index Oxygen Saturation Smoking Status             32.43 kg/m2 97% Never Smoker          Basic Information     Date Of Birth Sex Race Ethnicity Preferred Language    1940 Female Patient Refused Patient Refused English      Your appointments     2017  8:40 AM   New Patient with Tom Guerra M.D.   [facility] (--)    [address] [place] [address], [address]  [facility] [address] [phone]           Please bring Photo ID, Insurance Cards, All Medication Bottles and copies of any legal documents (such as Living Will, Power of ) If speaking a language besides English please bring an adult . Please arrive 30 minutes prior for check in and registration. You will be receiving a confirmation call a few days before your appointment from our automated call confirmation system.              Problem List              ICD-10-CM Priority Class Noted - Resolved    Essential hypertension I10   2017 - Present    Chronic left shoulder pain M25.512, G89.29   2017 - Present    Acquired hypothyroidism E03.9   2017 - Present    Dyslipidemia " E78.5   1/25/2017 - Present    Episode of recurrent major depressive disorder (CMS-HCC) F33.9   1/25/2017 - Present    Chronic obstructive pulmonary disease (CMS-HCC) J44.9   1/25/2017 - Present    Non morbid obesity due to excess calories E66.09   1/25/2017 - Present    IGT (impaired glucose tolerance) R73.02   1/25/2017 - Present    Alzheimer's dementia without behavioral disturbance G30.9, F02.80   2/8/2017 - Present      Health Maintenance        Date Due Completion Dates    IMM DTaP/Tdap/Td Vaccine (1 - Tdap) 5/21/1957 ---    IMM ZOSTER VACCINE 5/21/1998 ---    BONE DENSITY 5/21/2003 ---    IMM PNEUMOCOCCAL 65+ (ADULT) LOW/MEDIUM RISK SERIES (2 of 2 - PPSV23) 6/23/2017 6/23/2016            Current Immunizations     13-VALENT PCV PREVNAR 6/23/2016    Influenza Vaccine Adult HD 9/25/2016    Influenza Vaccine Quad Inj (Preserved) 10/13/2014, 9/14/2013      Below and/or attached are the medications your provider expects you to take. Review all of your home medications and newly ordered medications with your provider and/or pharmacist. Follow medication instructions as directed by your provider and/or pharmacist. Please keep your medication list with you and share with your provider. Update the information when medications are discontinued, doses are changed, or new medications (including over-the-counter products) are added; and carry medication information at all times in the event of emergency situations     Allergies:  No Known Allergies          Medications  Valid as of: February 22, 2017 -  2:48 PM    Generic Name Brand Name Tablet Size Instructions for use    Acetaminophen (Tab) TYLENOL 500 MG Take 1-2 Tabs by mouth every 6 hours as needed.        Albuterol Sulfate (Nebu Soln) PROVENTIL 2.5mg/3ml 3 mL by Nebulization route every four hours as needed for Shortness of Breath.        Albuterol Sulfate (Aero Soln) PROAIR  (90 BASE) MCG/ACT Inhale 2 Puffs by mouth every 6 hours as needed for Shortness of  Breath.        AmLODIPine Besylate (Tab) NORVASC 10 MG Take 1 Tab by mouth every day.        Budesonide-Formoterol Fumarate (Aerosol) SYMBICORT 160-4.5 MCG/ACT Inhale 1 Puff by mouth 2 Times a Day.        Citalopram Hydrobromide (Tab) CELEXA 20 MG Take 1 Tab by mouth every day.        Enalapril Maleate (Tab) VASOTEC 20 MG Take 1 Tab by mouth 2 times a day.        Folic Acid (Tab) FOLVITE 1 MG Take 1 Tab by mouth every day.        Ipratropium Bromide (Solution) ATROVENT 0.02 % 3 mL by Nebulization route 2 times a day.        Levothyroxine Sodium (Tab) SYNTHROID 125 MCG Take 1 Tab by mouth Every morning on an empty stomach.        Meclizine HCl (Tab) ANTIVERT 25 MG Take 1 Tab by mouth 3 times a day as needed.        Metoprolol Succinate (TABLET SR 24 HR) TOPROL  MG Take 1 Tab by mouth every day.        Pravastatin Sodium (Tab) PRAVACHOL 40 MG Take 1 Tab by mouth every day.        Sulfamethoxazole-Trimethoprim (Tab) BACTRIM -160 MG Take 1 Tab by mouth 2 times a day for 5 days.        .                 Medicines prescribed today were sent to:     [facility] [address] [place] [address]    Phone: [phone] Fax: [phone]    Open 24 Hours?: No      Medication refill instructions:       If your prescription bottle indicates you have medication refills left, it is not necessary to call your provider’s office. Please contact your pharmacy and they will refill your medication.    If your prescription bottle indicates you do not have any refills left, you may request refills at any time through one of the following ways: The online [employer] system (except Urgent Care), by calling your provider’s office, or by asking your pharmacy to contact your provider’s office with a refill request. Medication refills are processed only during regular business hours and may not be available until the next business day. Your provider may request additional  information or to have a follow-up visit with you prior to refilling your medication.   *Please Note: Medication refills are assigned a new Rx number when refilled electronically. Your pharmacy may indicate that no refills were authorized even though a new prescription for the same medication is available at the pharmacy. Please request the medicine by name with the pharmacy before contacting your provider for a refill.        Your To Do List     Future Labs/Procedures Complete By Expires    CCP ANTIBODY  As directed 2/23/2018    CREATINE KINASE  As directed 2/23/2018    RHEUMATOID ARTHRITIS FACTOR  As directed 2/23/2018    URINALYSIS,CULTURE IF INDICATED  As directed 2/22/2018    WESTERGREN SED RATE  As directed 2/23/2018         MyChart Status: Patient Declined

## 2017-02-22 NOTE — PROGRESS NOTES
Subjective:      Florida Ernst is a 76 y.o. female who presents with Pain and Other            Pain  Associated symptoms include neck pain.   Other  Associated symptoms include neck pain.   Florida Ernst is a patient of Dr. Warren here today for joint pain and confusion.      1. Alzheimer's dementia without behavioral disturbance, unspecified timing of dementia onset  Patient was recently referred by Dr. Warren to neurology for follow-up on this and she has an appointment in April. However, patient's  states over the past few days he thinks her confusion has been slightly worse. She is pleasant and talkative in the office but her  has to answer most of the questions for her. Her vital signs are good and she is afebrile.    2. Arthralgia, unspecified joint  Patient's  is concerned because patient seems to be having more problems with joint pain. Patient is complaining of pain in her neck, right wrist, right arm, and chronic pain in her shoulder. She has no history of rheumatoid arthritis. Her  wonders what medication she can take for pain.    3. Essential hypertension  Patient currently on amlodipine and enalapril. Blood pressure is been well controlled and chemistry panel showed no electrolyte disturbances are low GFR.    4. Dyslipidemia  Recent lab work ordered by Dr. Warren showed good cholesterol levels on her current dosage of pravastatin.    5. IGT (impaired glucose tolerance)  Patient had recent hemoglobin A1c because of impaired fasting glucose readings but her hemoglobin A1c comes back. At 5.4.    6. Mixed incontinence  With patient's mild worsening of confusion, I asked her  about patient's urinary status. He states that she has had problems with urinary incontinence on and off for a while. Typically it occurs with sneezing, coughing or not getting to the restroom fast enough. She has not had fever, flank pain, nausea or vomiting.    Current Outpatient  Prescriptions   Medication Sig Dispense Refill   • acetaminophen (TYLENOL) 500 MG Tab Take 1-2 Tabs by mouth every 6 hours as needed. 30 Tab 0   • sulfamethoxazole-trimethoprim (BACTRIM DS) 800-160 MG tablet Take 1 Tab by mouth 2 times a day for 5 days. 10 Tab 0   • amlodipine (NORVASC) 10 MG Tab Take 1 Tab by mouth every day. 90 Tab 3   • folic acid (FOLVITE) 1 MG Tab Take 1 Tab by mouth every day. 90 Tab 3   • levothyroxine (SYNTHROID) 125 MCG Tab Take 1 Tab by mouth Every morning on an empty stomach. 90 Tab 3   • pravastatin (PRAVACHOL) 40 MG tablet Take 1 Tab by mouth every day. 90 Tab 3   • metoprolol SR (TOPROL XL) 100 MG TABLET SR 24 HR Take 1 Tab by mouth every day. 90 Tab 3   • citalopram (CELEXA) 20 MG Tab Take 1 Tab by mouth every day. 90 Tab 3   • enalapril (VASOTEC) 20 MG tablet Take 1 Tab by mouth 2 times a day. 180 Tab 3   • meclizine (ANTIVERT) 25 MG Tab Take 1 Tab by mouth 3 times a day as needed. 90 Tab 0   • budesonide-formoterol (SYMBICORT) 160-4.5 MCG/ACT Aerosol Inhale 1 Puff by mouth 2 Times a Day. 1 Inhaler 6   • albuterol (PROVENTIL) 2.5mg/3ml Nebu Soln solution for nebulization 3 mL by Nebulization route every four hours as needed for Shortness of Breath. 300 mL 6   • PROAIR  (90 BASE) MCG/ACT Aero Soln inhalation aerosol Inhale 2 Puffs by mouth every 6 hours as needed for Shortness of Breath. 1 Inhaler 3   • ipratropium (ATROVENT) 0.02 % Solution 3 mL by Nebulization route 2 times a day. 100 Vial 6     No current facility-administered medications for this visit.     Social History   Substance Use Topics   • Smoking status: Never Smoker    • Smokeless tobacco: Never Used   • Alcohol Use: No     Family History   Problem Relation Age of Onset   • Stroke Mother    • No Known Problems Father    • Heart Disease Brother    History reviewed. No pertinent past medical history.    Review of Systems   Musculoskeletal: Positive for joint pain and neck pain.   Psychiatric/Behavioral: Positive  "for memory loss.   All other systems reviewed and are negative.         Objective:     /70 mmHg  Pulse 74  Temp(Src) 36.3 °C (97.4 °F)  Resp 16  Ht 1.626 m (5' 4.02\")  Wt 85.73 kg (189 lb)  BMI 32.43 kg/m2  SpO2 97%     Physical Exam   Constitutional: She is oriented to person, place, and time. She appears well-developed and well-nourished. No distress.   HENT:   Head: Normocephalic and atraumatic.   Right Ear: External ear normal.   Left Ear: External ear normal.   Nose: Nose normal.   Eyes: Right eye exhibits no discharge. Left eye exhibits no discharge.   Neck: Normal range of motion. Neck supple. No thyromegaly present.   Cardiovascular: Normal rate, regular rhythm and normal heart sounds.  Exam reveals no gallop and no friction rub.    No murmur heard.  Pulmonary/Chest: Effort normal and breath sounds normal. She has no wheezes. She has no rales.   Musculoskeletal: She exhibits no edema or tenderness.   She complains of pain in her posterior neck and to a lesser degree in her shoulders and wrists. No obvious swelling or redness of the joints.   Neurological: She is alert and oriented to person, place, and time. She displays normal reflexes.   Skin: Skin is warm and dry. No rash noted. She is not diaphoretic.   Psychiatric: She has a normal mood and affect. Her behavior is normal. Judgment and thought content normal.   Patient pleasant but   Nursing note and vitals reviewed.              Assessment/Plan:     1. Alzheimer's dementia without behavioral disturbance, unspecified timing of dementia onset  Recent worsening memory could be secondary to UTI based on urine dip in the office today. She will take her antibiotic and be sure to follow up with neurology in April.  - POCT Urinalysis    2. Arthralgia, unspecified joint  Most likely osteoarthritis but because of his multiple joints I will do a workup for rheumatoid arthritis. I advised against anti-inflammatories but she may take Tylenol.  - " RHEUMATOID ARTHRITIS FACTOR; Future  - CCP ANTIBODY; Future  - CREATINE KINASE; Future  - WESTERGREN SED RATE; Future  - acetaminophen (TYLENOL) 500 MG Tab; Take 1-2 Tabs by mouth every 6 hours as needed.  Dispense: 30 Tab; Refill: 0    3. Essential hypertension  Blood pressure appears well controlled and chemistry panel is good.    4. Dyslipidemia  Cholesterol appears well controlled on pravastatin.    5. IGT (impaired glucose tolerance)  Patient's hemoglobin A1c shows normal average blood sugars.    6. Mixed incontinence  Urine dip shows moderate leukocytes and hemolyzed blood. Her urine is dark in color. I will send her urine out for culture but start her on Bactrim DS for a probable UTI. There is no known history of sulfa allergy. They were told to stop the medicine if she develops a rash. She will drink more fluid and follow up if symptoms worsen.  - sulfamethoxazole-trimethoprim (BACTRIM DS) 800-160 MG tablet; Take 1 Tab by mouth 2 times a day for 5 days.  Dispense: 10 Tab; Refill: 0  - URINALYSIS,CULTURE IF INDICATED; Future

## 2017-02-23 LAB
APPEARANCE UR: ABNORMAL
BACTERIA #/AREA URNS HPF: ABNORMAL /HPF
BILIRUB UR QL STRIP.AUTO: NEGATIVE
COLOR UR AUTO: ABNORMAL
CULTURE IF INDICATED INDCX: YES UA CULTURE
EPITHELIAL CELLS 1715: ABNORMAL /HPF
GLUCOSE UR STRIP.AUTO-MCNC: NEGATIVE MG/DL
HYALINE CAST   1831: ABNORMAL /LPF
KETONES UR STRIP.AUTO-MCNC: NEGATIVE MG/DL
LEUKOCYTE ESTERASE UR QL STRIP.AUTO: NEGATIVE
MICRO URNS: ABNORMAL
MUCOUS THREADS URNS QL MICRO: ABNORMAL /HPF
NITRITE UR QL STRIP.AUTO: NEGATIVE
PH UR: 6.5 [PH]
PROT UR QL STRIP: NEGATIVE MG/DL
RBC #/AREA URNS HPF: ABNORMAL /HPF
RBC UR QL AUTO: ABNORMAL
SP GR UR STRIP.AUTO: 1.01
WBC #/AREA URNS HPF: ABNORMAL /HPF

## 2017-02-24 LAB
BACTERIA UR CULT: NORMAL
CCP IGG SERPL-ACNC: 3 UNITS (ref 0–19)
SIGNIFICANT IND 70042: NORMAL
SOURCE SOURCE: NORMAL

## 2017-02-25 LAB
BACTERIA UR CULT: NORMAL
SIGNIFICANT IND 70042: NORMAL
SOURCE SOURCE: NORMAL

## 2017-04-03 ENCOUNTER — OFFICE VISIT (OUTPATIENT)
Dept: MEDICAL GROUP | Facility: MEDICAL CENTER | Age: 77
End: 2017-04-03
Payer: MEDICARE

## 2017-04-03 VITALS
DIASTOLIC BLOOD PRESSURE: 70 MMHG | HEART RATE: 80 BPM | WEIGHT: 180 LBS | SYSTOLIC BLOOD PRESSURE: 136 MMHG | BODY MASS INDEX: 30.73 KG/M2 | HEIGHT: 64 IN | RESPIRATION RATE: 16 BRPM | OXYGEN SATURATION: 92 % | TEMPERATURE: 97.9 F

## 2017-04-03 DIAGNOSIS — R73.02 IGT (IMPAIRED GLUCOSE TOLERANCE): ICD-10-CM

## 2017-04-03 DIAGNOSIS — J44.1 COPD EXACERBATION (HCC): ICD-10-CM

## 2017-04-03 DIAGNOSIS — R05.9 COUGH: ICD-10-CM

## 2017-04-03 PROCEDURE — G8419 CALC BMI OUT NRM PARAM NOF/U: HCPCS | Performed by: INTERNAL MEDICINE

## 2017-04-03 PROCEDURE — 1101F PT FALLS ASSESS-DOCD LE1/YR: CPT | Performed by: INTERNAL MEDICINE

## 2017-04-03 PROCEDURE — 99214 OFFICE O/P EST MOD 30 MIN: CPT | Performed by: INTERNAL MEDICINE

## 2017-04-03 PROCEDURE — G8432 DEP SCR NOT DOC, RNG: HCPCS | Performed by: INTERNAL MEDICINE

## 2017-04-03 PROCEDURE — 4040F PNEUMOC VAC/ADMIN/RCVD: CPT | Performed by: INTERNAL MEDICINE

## 2017-04-03 PROCEDURE — 1036F TOBACCO NON-USER: CPT | Performed by: INTERNAL MEDICINE

## 2017-04-03 RX ORDER — BUDESONIDE AND FORMOTEROL FUMARATE DIHYDRATE 160; 4.5 UG/1; UG/1
1 AEROSOL RESPIRATORY (INHALATION) 2 TIMES DAILY
Qty: 1 INHALER | Refills: 6 | Status: SHIPPED | OUTPATIENT
Start: 2017-04-03 | End: 2017-11-08 | Stop reason: SDUPTHER

## 2017-04-03 RX ORDER — METHYLPREDNISOLONE 4 MG/1
TABLET ORAL
Qty: 21 TAB | Refills: 1 | Status: SHIPPED | OUTPATIENT
Start: 2017-04-03 | End: 2018-01-02 | Stop reason: SDUPTHER

## 2017-04-03 RX ORDER — BENZONATATE 100 MG/1
100 CAPSULE ORAL 3 TIMES DAILY PRN
Qty: 60 CAP | Refills: 0 | Status: SHIPPED | OUTPATIENT
Start: 2017-04-03 | End: 2017-06-30

## 2017-04-03 RX ORDER — AZITHROMYCIN 250 MG/1
250 TABLET, FILM COATED ORAL DAILY
Qty: 6 TAB | Refills: 0 | Status: SHIPPED | OUTPATIENT
Start: 2017-04-03 | End: 2017-04-08

## 2017-04-03 NOTE — MR AVS SNAPSHOT
"[place] Ernst   4/3/2017 10:20 AM   Office Visit   MRN: [id number]    Department:  [address] [place] [address]   Dept Phone:  [phone]    Description:  Female : 1940   Provider:  Orestes Warren M.D.           Reason for Visit     Cold Exposure for a week      Allergies as of 4/3/2017     Not on File      You were diagnosed with     Cough   [786.2.ICD-9-CM]       COPD exacerbation (CMS-[facility])   [900527]       IGT (impaired glucose tolerance)   [408889]         Vital Signs     Blood Pressure Pulse Temperature Respirations Height Weight    136/70 mmHg 80 36.6 °C (97.9 °F) 16 1.626 m (5' 4\") 81.647 kg (180 lb)    Body Mass Index Oxygen Saturation Smoking Status             30.88 kg/m2 92% Never Smoker          Basic Information     Date Of Birth Sex Race Ethnicity Preferred Language    1940 Female Patient Refused Patient Refused English      Your appointments     2017  8:40 AM   New Patient with Tom Guerra M.D.   [facility] (--)    [address] [place] [address], [address]  [facility] [id number] [phone]           Please bring Photo ID, Insurance Cards, All Medication Bottles and copies of any legal documents (such as Living Will, Power of ) If speaking a language besides English please bring an adult . Please arrive 30 minutes prior for check in and registration. You will be receiving a confirmation call a few days before your appointment from our automated call confirmation system.            Sep 11, 2017 11:40 AM   Established Patient with Orestes Warren M.D.   [address]    [address] [place] [address]  [address] [address]  [facility] [id number] [phone]           You will be receiving a confirmation call a few days before your appointment from our automated call confirmation system.              Problem List              ICD-10-CM Priority Class Noted - Resolved    Essential hypertension I10   2017 - Present    Chronic left shoulder pain " M25.512, G89.29   1/25/2017 - Present    Acquired hypothyroidism E03.9   1/25/2017 - Present    Dyslipidemia E78.5   1/25/2017 - Present    Episode of recurrent major depressive disorder (CMS-HCC) F33.9   1/25/2017 - Present    Chronic obstructive pulmonary disease (CMS-HCC) J44.9   1/25/2017 - Present    Non morbid obesity due to excess calories E66.09   1/25/2017 - Present    Alzheimer's dementia without behavioral disturbance G30.9, F02.80   2/8/2017 - Present    IGT (impaired glucose tolerance) R73.02   4/3/2017 - Present      Health Maintenance        Date Due Completion Dates    IMM DTaP/Tdap/Td Vaccine (1 - Tdap) 5/21/1957 ---    IMM ZOSTER VACCINE 5/21/1998 ---    BONE DENSITY 5/21/2003 ---    IMM PNEUMOCOCCAL 65+ (ADULT) LOW/MEDIUM RISK SERIES (2 of 2 - PPSV23) 6/23/2017 6/23/2016            Current Immunizations     13-VALENT PCV PREVNAR 6/23/2016    Influenza Vaccine Adult HD 9/25/2016    Influenza Vaccine Quad Inj (Preserved) 10/13/2014, 9/14/2013      Below and/or attached are the medications your provider expects you to take. Review all of your home medications and newly ordered medications with your provider and/or pharmacist. Follow medication instructions as directed by your provider and/or pharmacist. Please keep your medication list with you and share with your provider. Update the information when medications are discontinued, doses are changed, or new medications (including over-the-counter products) are added; and carry medication information at all times in the event of emergency situations     Allergies:  No Known Allergies          Medications  Valid as of: April 03, 2017 - 10:45 AM    Generic Name Brand Name Tablet Size Instructions for use    Acetaminophen (Tab) TYLENOL 500 MG Take 1-2 Tabs by mouth every 6 hours as needed.        Albuterol Sulfate (Nebu Soln) PROVENTIL 2.5mg/3ml 3 mL by Nebulization route every four hours as needed for Shortness of Breath.        Albuterol Sulfate (Aero  Soln) PROAIR  (90 BASE) MCG/ACT Inhale 2 Puffs by mouth every 6 hours as needed for Shortness of Breath.        AmLODIPine Besylate (Tab) NORVASC 10 MG Take 1 Tab by mouth every day.        Azithromycin (Tab) ZITHROMAX 250 MG Take 1 Tab by mouth every day for 5 days. Take 2 tabs on day 1        Benzonatate (Cap) TESSALON 100 MG Take 1 Cap by mouth 3 times a day as needed for Cough.        Budesonide-Formoterol Fumarate (Aerosol) SYMBICORT 160-4.5 MCG/ACT Inhale 1 Puff by mouth 2 Times a Day.        Citalopram Hydrobromide (Tab) CELEXA 20 MG Take 1 Tab by mouth every day.        Enalapril Maleate (Tab) VASOTEC 20 MG Take 1 Tab by mouth 2 times a day.        Folic Acid (Tab) FOLVITE 1 MG Take 1 Tab by mouth every day.        Ipratropium Bromide (Solution) ATROVENT 0.02 % 3 mL by Nebulization route 2 times a day.        Levothyroxine Sodium (Tab) SYNTHROID 125 MCG Take 1 Tab by mouth Every morning on an empty stomach.        Meclizine HCl (Tab) ANTIVERT 25 MG Take 1 Tab by mouth 3 times a day as needed.        MethylPREDNISolone (Tablet Therapy Pack) MEDROL DOSEPAK 4 MG Per package insert.        Metoprolol Succinate (TABLET SR 24 HR) TOPROL  MG Take 1 Tab by mouth every day.        Pravastatin Sodium (Tab) PRAVACHOL 40 MG Take 1 Tab by mouth every day.        .                 Medicines prescribed today were sent to:     [facility] [address] [place] [address]    Phone: [phone] Fax: [phone]    Open 24 Hours?: No      Medication refill instructions:       If your prescription bottle indicates you have medication refills left, it is not necessary to call your provider’s office. Please contact your pharmacy and they will refill your medication.    If your prescription bottle indicates you do not have any refills left, you may request refills at any time through one of the following ways: The online [employer] system (except Urgent Care),  by calling your provider’s office, or by asking your pharmacy to contact your provider’s office with a refill request. Medication refills are processed only during regular business hours and may not be available until the next business day. Your provider may request additional information or to have a follow-up visit with you prior to refilling your medication.   *Please Note: Medication refills are assigned a new Rx number when refilled electronically. Your pharmacy may indicate that no refills were authorized even though a new prescription for the same medication is available at the pharmacy. Please request the medicine by name with the pharmacy before contacting your provider for a refill.           MyChart Status: Patient Declined

## 2017-04-03 NOTE — PROGRESS NOTES
CC: Cough and follow-up blood work    HPI:   [place] presents today with the following.    1. Cough  Presents complaining of upper story symptoms for approximately 2 weeks. Reports having a cough productive of yellowish sputum. She reports sinus congestion but no earache or sore throat. No other localizing infectious symptoms.    2. COPD exacerbation (CMS-HCC)  She is using her inhalers as instructed she does report some slight wheezing and increased shortness of breath. She states is not severe but is noticeable.    3. IGT (impaired glucose tolerance)  Recently had blood work showing an A1c of 5.4. Describes a history of diabetes currently not on medications to lower sugars. She has requested for testing strips.      Patient Active Problem List    Diagnosis Date Noted   • IGT (impaired glucose tolerance) 04/03/2017   • Alzheimer's dementia without behavioral disturbance 02/08/2017   • Essential hypertension 01/25/2017   • Chronic left shoulder pain 01/25/2017   • Acquired hypothyroidism 01/25/2017   • Dyslipidemia 01/25/2017   • Episode of recurrent major depressive disorder (CMS-HCC) 01/25/2017   • Chronic obstructive pulmonary disease (CMS-HCC) 01/25/2017   • Non morbid obesity due to excess calories 01/25/2017       Current Outpatient Prescriptions   Medication Sig Dispense Refill   • budesonide-formoterol (SYMBICORT) 160-4.5 MCG/ACT Aerosol Inhale 1 Puff by mouth 2 Times a Day. 1 Inhaler 6   • MethylPREDNISolone (MEDROL DOSEPAK) 4 MG Tablet Therapy Pack Per package insert. 21 Tab 1   • azithromycin (ZITHROMAX Z-CECILIO) 250 MG Tab Take 1 Tab by mouth every day for 5 days. Take 2 tabs on day 1 6 Tab 0   • benzonatate (TESSALON) 100 MG Cap Take 1 Cap by mouth 3 times a day as needed for Cough. 60 Cap 0   • acetaminophen (TYLENOL) 500 MG Tab Take 1-2 Tabs by mouth every 6 hours as needed. 30 Tab 0   • amlodipine (NORVASC) 10 MG Tab Take 1 Tab by mouth every day. 90 Tab 3   • folic acid (FOLVITE) 1 MG Tab Take 1 Tab by  "mouth every day. 90 Tab 3   • levothyroxine (SYNTHROID) 125 MCG Tab Take 1 Tab by mouth Every morning on an empty stomach. 90 Tab 3   • pravastatin (PRAVACHOL) 40 MG tablet Take 1 Tab by mouth every day. 90 Tab 3   • metoprolol SR (TOPROL XL) 100 MG TABLET SR 24 HR Take 1 Tab by mouth every day. 90 Tab 3   • citalopram (CELEXA) 20 MG Tab Take 1 Tab by mouth every day. 90 Tab 3   • enalapril (VASOTEC) 20 MG tablet Take 1 Tab by mouth 2 times a day. 180 Tab 3   • meclizine (ANTIVERT) 25 MG Tab Take 1 Tab by mouth 3 times a day as needed. 90 Tab 0   • albuterol (PROVENTIL) 2.5mg/3ml Nebu Soln solution for nebulization 3 mL by Nebulization route every four hours as needed for Shortness of Breath. 300 mL 6   • PROAIR  (90 BASE) MCG/ACT Aero Soln inhalation aerosol Inhale 2 Puffs by mouth every 6 hours as needed for Shortness of Breath. 1 Inhaler 3   • ipratropium (ATROVENT) 0.02 % Solution 3 mL by Nebulization route 2 times a day. 100 Vial 6     No current facility-administered medications for this visit.         Allergies as of 04/03/2017   • (Not on File)        ROS: As per HPI.    /70 mmHg  Pulse 80  Temp(Src) 36.6 °C (97.9 °F)  Resp 16  Ht 1.626 m (5' 4\")  Wt 81.647 kg (180 lb)  BMI 30.88 kg/m2  SpO2 92%    Physical Exam:  Gen:         Alert and oriented, No apparent distress.  Neck:        No Lymphadenopathy or Bruits.  Lungs: Mild scattered wheeze otherwise clear  CV:          Regular rate and rhythm. No murmurs, rubs or gallops.               Ext:          No clubbing, cyanosis, edema.      Assessment and Plan.   76 y.o. female with the following issues.    1. Cough  Likely viral in nature however see below for treatment. Have placed on a cough suppressant.  - benzonatate (TESSALON) 100 MG Cap; Take 1 Cap by mouth 3 times a day as needed for Cough.  Dispense: 60 Cap; Refill: 0    2. COPD exacerbation (CMS-[facility])  We have placed on the steroid taper as well as an antibiotic if not improving will " follow up.  - MethylPREDNISolone (MEDROL DOSEPAK) 4 MG Tablet Therapy Pack; Per package insert.  Dispense: 21 Tab; Refill: 1  - azithromycin (ZITHROMAX Z-CECILIO) 250 MG Tab; Take 1 Tab by mouth every day for 5 days. Take 2 tabs on day 1  Dispense: 6 Tab; Refill: 0    3. IGT (impaired glucose tolerance)  Discussion today that she is not diabetic and no further need to test blood sugars on a regular basis but will follow every 6 months.

## 2017-04-21 ENCOUNTER — OFFICE VISIT (OUTPATIENT)
Dept: NEUROLOGY | Facility: MEDICAL CENTER | Age: 77
End: 2017-04-21
Payer: MEDICARE

## 2017-04-21 ENCOUNTER — HOSPITAL ENCOUNTER (OUTPATIENT)
Dept: LAB | Facility: MEDICAL CENTER | Age: 77
End: 2017-04-21
Attending: PSYCHIATRY & NEUROLOGY
Payer: MEDICARE

## 2017-04-21 VITALS
DIASTOLIC BLOOD PRESSURE: 54 MMHG | SYSTOLIC BLOOD PRESSURE: 96 MMHG | RESPIRATION RATE: 16 BRPM | WEIGHT: 182.8 LBS | TEMPERATURE: 97.6 F | HEIGHT: 64 IN | HEART RATE: 56 BPM | BODY MASS INDEX: 31.21 KG/M2

## 2017-04-21 DIAGNOSIS — G30.1 LATE ONSET ALZHEIMER'S DISEASE WITHOUT BEHAVIORAL DISTURBANCE (HCC): ICD-10-CM

## 2017-04-21 DIAGNOSIS — F02.80 LATE ONSET ALZHEIMER'S DISEASE WITHOUT BEHAVIORAL DISTURBANCE (HCC): ICD-10-CM

## 2017-04-21 DIAGNOSIS — E53.8 VITAMIN B12 DEFICIENCY: ICD-10-CM

## 2017-04-21 DIAGNOSIS — E03.9 ACQUIRED HYPOTHYROIDISM: ICD-10-CM

## 2017-04-21 LAB
25(OH)D3 SERPL-MCNC: 26 NG/ML (ref 30–100)
CRP SERPL HS-MCNC: 1.9 MG/L (ref 0–7.5)
THYROPEROXIDASE AB SERPL-ACNC: 511.2 IU/ML (ref 0–9)

## 2017-04-21 PROCEDURE — 4040F PNEUMOC VAC/ADMIN/RCVD: CPT | Performed by: PSYCHIATRY & NEUROLOGY

## 2017-04-21 PROCEDURE — 1036F TOBACCO NON-USER: CPT | Performed by: PSYCHIATRY & NEUROLOGY

## 2017-04-21 PROCEDURE — 82306 VITAMIN D 25 HYDROXY: CPT

## 2017-04-21 PROCEDURE — 1101F PT FALLS ASSESS-DOCD LE1/YR: CPT | Performed by: PSYCHIATRY & NEUROLOGY

## 2017-04-21 PROCEDURE — 86225 DNA ANTIBODY NATIVE: CPT

## 2017-04-21 PROCEDURE — 86141 C-REACTIVE PROTEIN HS: CPT

## 2017-04-21 PROCEDURE — 86376 MICROSOMAL ANTIBODY EACH: CPT

## 2017-04-21 PROCEDURE — G8432 DEP SCR NOT DOC, RNG: HCPCS | Performed by: PSYCHIATRY & NEUROLOGY

## 2017-04-21 PROCEDURE — 99204 OFFICE O/P NEW MOD 45 MIN: CPT | Performed by: PSYCHIATRY & NEUROLOGY

## 2017-04-21 PROCEDURE — 86038 ANTINUCLEAR ANTIBODIES: CPT

## 2017-04-21 PROCEDURE — G8419 CALC BMI OUT NRM PARAM NOF/U: HCPCS | Performed by: PSYCHIATRY & NEUROLOGY

## 2017-04-21 PROCEDURE — 86235 NUCLEAR ANTIGEN ANTIBODY: CPT | Mod: 91

## 2017-04-21 PROCEDURE — 86800 THYROGLOBULIN ANTIBODY: CPT

## 2017-04-21 PROCEDURE — 36415 COLL VENOUS BLD VENIPUNCTURE: CPT

## 2017-04-21 RX ORDER — MEMANTINE HYDROCHLORIDE 5 MG/1
5 TABLET ORAL 2 TIMES DAILY
Qty: 60 TAB | Refills: 3 | Status: SHIPPED | OUTPATIENT
Start: 2017-04-21 | End: 2017-08-19 | Stop reason: SDUPTHER

## 2017-04-21 NOTE — PATIENT INSTRUCTIONS
IMPRESSION:    1. Memory problem for years-- her former neurologist was in [facility]  2. Hx of Vit B12 deficiency ( diagnosed in [place]), daily Headache, Dizziness  3. Hx of hypothyroidism, COPD, HTN. Depression, Hearing impairment    PLAN/RECOMMENDATIONS:      Advise the following  ________________________________________________________________________    Fish Oil -- Omega 3 1000mg  3# daily  Vit D-3 2000 unit daily  ________________________________________________________________________      ________________________________________________________________________    Advise exercise muscle and brain  Avoid processed foods-- focus on natural foods  Avoid sugar      ________________________________________________________________________    We will add Namenda 5mg two times per day-- the rationale-- the patient has memory problems    In the future, we may GET MRI of brain here , EEG too      SIGNATURE:  Tom Guerra      CC:  Orestes Warren M.D.

## 2017-04-21 NOTE — MR AVS SNAPSHOT
"        [place] Ernst   2017 8:40 AM   Office Visit   MRN: [id number]    Department:  [facility]   Dept Phone:  [phone]    Description:  Female : 1940   Provider:  Tom Guerra M.D.           Reason for Visit     Establish Care Alzheimer's      Allergies as of 2017     No Known Allergies      You were diagnosed with     Acquired hypothyroidism   [4379942]       Late onset Alzheimer's disease without behavioral disturbance   [9470616]       Vitamin B12 deficiency   [152677]         Vital Signs     Blood Pressure Pulse Temperature Respirations Height Weight    96/54 mmHg 56 36.4 °C (97.6 °F) 16 1.626 m (5' 4\") 82.918 kg (182 lb 12.8 oz)    Body Mass Index Smoking Status                31.36 kg/m2 Never Smoker           Basic Information     Date Of Birth Sex Race Ethnicity Preferred Language    1940 Female Patient Refused Patient Refused English      Your appointments     Sep 11, 2017 11:40 AM   Established Patient with Orestes Warren M.D.   [facility] [address]    [address]  [facility]  [facility] [address]   [phone]           You will be receiving a confirmation call a few days before your appointment from our automated call confirmation system.              Problem List              ICD-10-CM Priority Class Noted - Resolved    Essential hypertension I10   2017 - Present    Chronic left shoulder pain M25.512, G89.29   2017 - Present    Acquired hypothyroidism E03.9   2017 - Present    Dyslipidemia E78.5   2017 - Present    Episode of recurrent major depressive disorder (CMS-HCC) F33.9   2017 - Present    Chronic obstructive pulmonary disease (CMS-[facility]) J44.9   2017 - Present    Non morbid obesity due to excess calories E66.09   2017 - Present    Alzheimer's dementia without behavioral disturbance G30.9, F02.80   2017 - Present    IGT (impaired glucose tolerance) R73.02   4/3/2017 - Present    Vitamin B12 " deficiency E53.8   4/21/2017 - Present      Health Maintenance        Date Due Completion Dates    IMM DTaP/Tdap/Td Vaccine (1 - Tdap) 5/21/1957 ---    IMM ZOSTER VACCINE 5/21/1998 ---    BONE DENSITY 5/21/2003 ---    IMM PNEUMOCOCCAL 65+ (ADULT) LOW/MEDIUM RISK SERIES (2 of 2 - PPSV23) 6/23/2017 6/23/2016            Current Immunizations     13-VALENT PCV PREVNAR 6/23/2016    Influenza Vaccine Adult HD 9/25/2016    Influenza Vaccine Quad Inj (Preserved) 10/13/2014, 9/14/2013      Below and/or attached are the medications your provider expects you to take. Review all of your home medications and newly ordered medications with your provider and/or pharmacist. Follow medication instructions as directed by your provider and/or pharmacist. Please keep your medication list with you and share with your provider. Update the information when medications are discontinued, doses are changed, or new medications (including over-the-counter products) are added; and carry medication information at all times in the event of emergency situations     Allergies:  No Known Allergies          Medications  Valid as of: April 21, 2017 -  9:10 AM    Generic Name Brand Name Tablet Size Instructions for use    Acetaminophen (Tab) TYLENOL 500 MG Take 1-2 Tabs by mouth every 6 hours as needed.        Albuterol Sulfate (Nebu Soln) PROVENTIL 2.5mg/3ml 3 mL by Nebulization route every four hours as needed for Shortness of Breath.        Albuterol Sulfate (Aero Soln) PROAIR  (90 BASE) MCG/ACT Inhale 2 Puffs by mouth every 6 hours as needed for Shortness of Breath.        AmLODIPine Besylate (Tab) NORVASC 10 MG Take 1 Tab by mouth every day.        Benzonatate (Cap) TESSALON 100 MG Take 1 Cap by mouth 3 times a day as needed for Cough.        Budesonide-Formoterol Fumarate (Aerosol) SYMBICORT 160-4.5 MCG/ACT Inhale 1 Puff by mouth 2 Times a Day.        Citalopram Hydrobromide (Tab) CELEXA 20 MG Take 1 Tab by mouth every day.         Enalapril Maleate (Tab) VASOTEC 20 MG Take 1 Tab by mouth 2 times a day.        Folic Acid (Tab) FOLVITE 1 MG Take 1 Tab by mouth every day.        Ipratropium Bromide (Solution) ATROVENT 0.02 % 3 mL by Nebulization route 2 times a day.        Levothyroxine Sodium (Tab) SYNTHROID 125 MCG Take 1 Tab by mouth Every morning on an empty stomach.        Meclizine HCl (Tab) ANTIVERT 25 MG Take 1 Tab by mouth 3 times a day as needed.        Memantine HCl (Tab) NAMENDA 5 MG Take 1 Tab by mouth 2 times a day.        MethylPREDNISolone (Tablet Therapy Pack) MEDROL DOSEPAK 4 MG Per package insert.        Metoprolol Succinate (TABLET SR 24 HR) TOPROL  MG Take 1 Tab by mouth every day.        Pravastatin Sodium (Tab) PRAVACHOL 40 MG Take 1 Tab by mouth every day.        .                 Medicines prescribed today were sent to:     [facility] [address] [place] [address]    Phone: [phone] Fax: [phone]    Open 24 Hours?: No      Medication refill instructions:       If your prescription bottle indicates you have medication refills left, it is not necessary to call your provider’s office. Please contact your pharmacy and they will refill your medication.    If your prescription bottle indicates you do not have any refills left, you may request refills at any time through one of the following ways: The online [employer] system (except Urgent Care), by calling your provider’s office, or by asking your pharmacy to contact your provider’s office with a refill request. Medication refills are processed only during regular business hours and may not be available until the next business day. Your provider may request additional information or to have a follow-up visit with you prior to refilling your medication.   *Please Note: Medication refills are assigned a new Rx number when refilled electronically. Your pharmacy may indicate that no refills were authorized  even though a new prescription for the same medication is available at the pharmacy. Please request the medicine by name with the pharmacy before contacting your provider for a refill.        Your To Do List     Future Labs/Procedures Complete By Expires    ARMAND ANTIBODY WITH REFLEX  As directed 4/22/2018      Instructions        IMPRESSION:    1. Memory problem for years-- her former neurologist was in [facility]  2. Hx of Vit B12 deficiency ( diagnosed in [place]), daily Headache, Dizziness  3. Hx of hypothyroidism, COPD, HTN. Depression, Hearing impairment    PLAN/RECOMMENDATIONS:      Advise the following  ________________________________________________________________________    Fish Oil -- Omega 3 1000mg  3# daily  Vit D-3 2000 unit daily  ________________________________________________________________________      ________________________________________________________________________    Advise exercise muscle and brain  Avoid processed foods-- focus on natural foods  Avoid sugar      ________________________________________________________________________    We will add Namenda 5mg two times per day-- the rationale-- the patient has memory problems    In the future, we may GET MRI of brain here , EEG too      SIGNATURE:  Tom Guerra      CC:  MARIELENA Alexis Status: Patient Declined

## 2017-04-21 NOTE — PROGRESS NOTES
NEUROLOGY NOTE    Referring Physician  Orestes Warren      CHIEF COMPLAINT:  Memory problems for years-- just moved to [place] from [facility]  She was told to have vit B12 deficiency  She also has daily headache  Chief Complaint   Patient presents with   • Establish Care     Alzheimer's       PRESENT ILLNESS:   Memory problems for years-- just moved to [place] from [facility]  She was told to have vit B12 deficiency  She also has daily headache    She also had COPD  PAST MEDICAL HISTORY:  No past medical history on file.    PAST SURGICAL HISTORY:  No past surgical history on file.    FAMILY HISTORY:  Family History   Problem Relation Age of Onset   • Stroke Mother    • No Known Problems Father    • Heart Disease Brother        SOCIAL HISTORY:  Social History     Social History   • Marital Status:      Spouse Name: N/A   • Number of Children: N/A   • Years of Education: N/A     Occupational History   • Not on file.     Social History Main Topics   • Smoking status: Never Smoker    • Smokeless tobacco: Never Used   • Alcohol Use: No   • Drug Use: No   • Sexual Activity:     Partners: Male     Other Topics Concern   • Not on file     Social History Narrative     ALLERGIES:  No Known Allergies  TOBHX  History   Smoking status   • Never Smoker    Smokeless tobacco   • Never Used     ALCHX  History   Alcohol Use No     DRUGHX  History   Drug Use No           MEDICATIONS:  Current Outpatient Prescriptions   Medication   • budesonide-formoterol (SYMBICORT) 160-4.5 MCG/ACT Aerosol   • MethylPREDNISolone (MEDROL DOSEPAK) 4 MG Tablet Therapy Pack   • benzonatate (TESSALON) 100 MG Cap   • amlodipine (NORVASC) 10 MG Tab   • folic acid (FOLVITE) 1 MG Tab   • levothyroxine (SYNTHROID) 125 MCG Tab   • pravastatin (PRAVACHOL) 40 MG tablet   • metoprolol SR (TOPROL XL) 100 MG TABLET SR 24 HR   • citalopram (CELEXA) 20 MG Tab   • enalapril (VASOTEC) 20 MG tablet   • meclizine (ANTIVERT) 25 MG Tab   • albuterol  "(PROVENTIL) 2.5mg/3ml Nebu Soln solution for nebulization   • PROAIR  (90 BASE) MCG/ACT Aero Soln inhalation aerosol   • ipratropium (ATROVENT) 0.02 % Solution   • acetaminophen (TYLENOL) 500 MG Tab     No current facility-administered medications for this visit.       REVIEW OF SYSTEM:    Constitutional: Denies fevers, Denies weight changes   Eyes: Denies changes in vision, no eye pain   Ears/Nose/Throat/Mouth: Denies nasal congestion or sore throat   Cardiovascular: Denies chest pain or palpitations   Respiratory: COPD  Gastrointestinal/Hepatic: Denies abdominal pain, nausea, vomiting, diarrhea, constipation or GI bleeding   Genitourinary: Denies bladder dysfunction, dysuria or frequency   Musculoskeletal/Rheum: Denies joint pain and swelling   Skin/Breast: Denies rash, denies breast lumps or discharge   Neurological: Headache, DiZziness( dizziness spells made her sleepy)  Psychiatric: denies mood disorder   Endocrine: denies hx of diabetes or thyroid dysfunction   Heme/Oncology/Lymph Nodes: Denies enlarged lymph nodes, denies brusing or known bleeding disorder   Allergic/Immunologic: Denies hx of allergies         PHYSICAL AND NEUROLOGICAL EXMAINATIONS:  VITAL SIGNS: BP 96/54 mmHg  Pulse 56  Temp(Src) 36.4 °C (97.6 °F)  Resp 16  Ht 1.626 m (5' 4\")  Wt 82.918 kg (182 lb 12.8 oz)  BMI 31.36 kg/m2  CURRENT WEIGHT: [unfilled]  BMI: Body mass index is 31.36 kg/(m^2).  PREVIOUS WEIGHTS:  Wt Readings from Last 25 Encounters:   04/21/17 82.918 kg (182 lb 12.8 oz)   04/03/17 81.647 kg (180 lb)   02/22/17 85.73 kg (189 lb)   02/08/17 85.276 kg (188 lb)   01/25/17 86.637 kg (191 lb)       General appearance of patient: WDWN(+) NAD(+)    EYES  o Fundus : Papilledem(-) Exudates(-) Hemorrhage(-)  Nervous System  Orientation to time, place and person(+)  Memory normal(-)  Language: aphasia(-)  Knowledge: past(+) Current(+)  Attention(+)  Cranial Nerves  • Nerve 2: intact  • Nerve 3,4,6: intact  • Nerve 5 : intact  • " Nerve 7: intact  • Nerve 8: intact  • Nerve 9 & 10: intact  • Nerve 11: intact  • Nerve 12: intact  Muscle Power and muscle tone: symmetric, normal in upper and lower  Sensory System: Pin sensation intact(+)  Reflexes: symmetric throughout  Cerebellar Function FNP normal   Gait : Steady(+) TandemGait steady(+)  Heart and Vascular  Peripheral Vasucular system : Edema (-) Swelling(-)  RHB, Breathing sound clear  abdomen bowel sound normoactive  Extremities freely moveable  Joints no contracture       Mini-Mental State Examination (MMSE)    Performed by Tom Guerra M.D.04/21/2017    Maximum Score Patient’s Score Questions   5 5 “What is the year? Season? Date? Day of the week? Month?”   5 5 “Where are we now: State? County? Town/city? Hospital? Floor?”   3 3 The examiner names three unrelated objects clearly and slowly, then  asks the patient to name all three of them. The patient’s response is  used for scoring. The examiner repeats them until patient learns all of  them, if possible. Number of trials: ___________   5  “Spell WORLD backwards.” (D-L-R-O-W)   3 3 “Earlier I told you the names of three things. Can you tell me what those were?”   2  Show the patient two simple objects, such as a wristwatch and a pencil, and ask the patient to name them.   1  “Repeat the phrase: ‘No ifs, ands, or buts.’”   3  “Take the paper in your right hand, fold it in half, and put it on the floor.”(The examiner gives the patient a piece of blank paper.)   1  “Please read this and do what it says.” (Written instruction is “Close  your eyes.”)   1  “Make up and write a sentence about anything.” (This sentence must  contain a noun and a verb.)   1  “Please copy this picture.” (The examiner gives the patient a blank  piece of paper and asks him/her to draw the symbol below. All 10  angles must be present and two must intersect.)     30  TOTAL           NEUROIMAGING: I reviewed the MRI/CT of brain       LAB:            IMPRESSION:    1.  Memory problem for years-- her former neurologist was in [place]  2. Hx of Vit B12 deficiency ( diagnosed in [place]), daily Headache, Dizziness  3. Hx of hypothyroidism, COPD, HTN. Depression, Hearing impairment    PLAN/RECOMMENDATIONS:      Advise the following  ________________________________________________________________________    Fish Oil -- Omega 3 1000mg  3# daily  Vit D-3 2000 unit daily  ________________________________________________________________________      ________________________________________________________________________    Advise exercise muscle and brain  Avoid processed foods-- focus on natural foods  Avoid sugar      ________________________________________________________________________    We will add Namenda 5mg two times per day-- the rationale-- the patient has memory problems    In the future, we may GET MRI of brain here , EEG too      SIGNATURE:  Tom Guerra      CC:  Orestes Warren M.D.

## 2017-04-23 LAB
NUCLEAR IGG SER QL IA: NORMAL
THYROGLOB AB SERPL-ACNC: <0.9 IU/ML (ref 0–4)

## 2017-05-10 ENCOUNTER — PATIENT OUTREACH (OUTPATIENT)
Dept: HEALTH INFORMATION MANAGEMENT | Facility: OTHER | Age: 77
End: 2017-05-10

## 2017-05-22 NOTE — PROGRESS NOTES
Attempt #:3    WebIZ Checked & Epic Updated: yes  HealthConnect Verified: yes  Verify PCP: yes    Communication Preference Obtained: yes     Review Care Team: yes    Annual Wellness Visit Scheduling  1. Scheduling Status:Scheduled    Care Gap Scheduling (Attempt to Schedule EACH Overdue Care Gap!)     Health Maintenance Due   Topic Date Due   • PFT SCREENING-FEV1 AND FEV/FVC RATIO / SPIROMETRY SHOULD BE PERFORMED ANNUALLY  05/21/1956   • IMM DTaP/Tdap/Td Vaccine (1 - Tdap) Scheduled with annual    • IMM ZOSTER VACCINE  Scheduled with annual    • BONE DENSITY  Scheduled           MyChart Activation: declined  MyChart Amanda: no  Virtual Visits: no  Opt In to Text Messages: no

## 2017-05-23 ENCOUNTER — TELEPHONE (OUTPATIENT)
Dept: MEDICAL GROUP | Facility: MEDICAL CENTER | Age: 77
End: 2017-05-23

## 2017-05-23 NOTE — TELEPHONE ENCOUNTER
Left message for patient to call back regarding AWV pre-visit planning. Please transfer call to [phone].

## 2017-05-25 ENCOUNTER — OFFICE VISIT (OUTPATIENT)
Dept: MEDICAL GROUP | Facility: MEDICAL CENTER | Age: 77
End: 2017-05-25
Payer: MEDICARE

## 2017-05-25 VITALS
OXYGEN SATURATION: 94 % | TEMPERATURE: 97.9 F | WEIGHT: 183 LBS | RESPIRATION RATE: 16 BRPM | SYSTOLIC BLOOD PRESSURE: 144 MMHG | DIASTOLIC BLOOD PRESSURE: 76 MMHG | HEIGHT: 64 IN | BODY MASS INDEX: 31.24 KG/M2 | HEART RATE: 61 BPM

## 2017-05-25 DIAGNOSIS — L60.2 NAIL THICKENING: ICD-10-CM

## 2017-05-25 DIAGNOSIS — G30.1 LATE ONSET ALZHEIMER'S DISEASE WITHOUT BEHAVIORAL DISTURBANCE (HCC): ICD-10-CM

## 2017-05-25 DIAGNOSIS — M79.671 PAIN IN BOTH FEET: ICD-10-CM

## 2017-05-25 DIAGNOSIS — M79.672 PAIN IN BOTH FEET: ICD-10-CM

## 2017-05-25 DIAGNOSIS — F02.80 LATE ONSET ALZHEIMER'S DISEASE WITHOUT BEHAVIORAL DISTURBANCE (HCC): ICD-10-CM

## 2017-05-25 PROCEDURE — 4040F PNEUMOC VAC/ADMIN/RCVD: CPT | Performed by: INTERNAL MEDICINE

## 2017-05-25 PROCEDURE — 99213 OFFICE O/P EST LOW 20 MIN: CPT | Performed by: INTERNAL MEDICINE

## 2017-05-25 PROCEDURE — G8432 DEP SCR NOT DOC, RNG: HCPCS | Performed by: INTERNAL MEDICINE

## 2017-05-25 PROCEDURE — G8419 CALC BMI OUT NRM PARAM NOF/U: HCPCS | Performed by: INTERNAL MEDICINE

## 2017-05-25 PROCEDURE — 1101F PT FALLS ASSESS-DOCD LE1/YR: CPT | Performed by: INTERNAL MEDICINE

## 2017-05-25 PROCEDURE — 1036F TOBACCO NON-USER: CPT | Performed by: INTERNAL MEDICINE

## 2017-05-25 NOTE — PROGRESS NOTES
CC: Follow-up multiple issues    HPI:   [place] presents today with the following.    1. Late onset Alzheimer's disease without behavioral disturbance  Presents after being evaluated by neurology and added Namenda to her regimen. She reports she is tolerating well without complaint.    2. Pain in both feet  Biggest concerns today are her feet. She reports bilateral pain in her arch is going back to the heel. Denies any injury. She also reports that the pain is worse when she first gets out of bed loosens up.    3. Nail thickening  Difficulty trimming nails quite thick and would like referral to podiatry.      Patient Active Problem List    Diagnosis Date Noted   • Vitamin B12 deficiency 04/21/2017   • IGT (impaired glucose tolerance) 04/03/2017   • Alzheimer's dementia without behavioral disturbance 02/08/2017   • Essential hypertension 01/25/2017   • Chronic left shoulder pain 01/25/2017   • Acquired hypothyroidism 01/25/2017   • Dyslipidemia 01/25/2017   • Episode of recurrent major depressive disorder (CMS-HCC) 01/25/2017   • Chronic obstructive pulmonary disease (CMS-HCC) 01/25/2017   • Non morbid obesity due to excess calories 01/25/2017       Current Outpatient Prescriptions   Medication Sig Dispense Refill   • memantine (NAMENDA) 5 MG Tab Take 1 Tab by mouth 2 times a day. 60 Tab 3   • budesonide-formoterol (SYMBICORT) 160-4.5 MCG/ACT Aerosol Inhale 1 Puff by mouth 2 Times a Day. 1 Inhaler 6   • MethylPREDNISolone (MEDROL DOSEPAK) 4 MG Tablet Therapy Pack Per package insert. 21 Tab 1   • benzonatate (TESSALON) 100 MG Cap Take 1 Cap by mouth 3 times a day as needed for Cough. 60 Cap 0   • acetaminophen (TYLENOL) 500 MG Tab Take 1-2 Tabs by mouth every 6 hours as needed. 30 Tab 0   • amlodipine (NORVASC) 10 MG Tab Take 1 Tab by mouth every day. 90 Tab 3   • folic acid (FOLVITE) 1 MG Tab Take 1 Tab by mouth every day. 90 Tab 3   • levothyroxine (SYNTHROID) 125 MCG Tab Take 1 Tab by mouth Every morning on an empty  "stomach. 90 Tab 3   • pravastatin (PRAVACHOL) 40 MG tablet Take 1 Tab by mouth every day. 90 Tab 3   • metoprolol SR (TOPROL XL) 100 MG TABLET SR 24 HR Take 1 Tab by mouth every day. 90 Tab 3   • citalopram (CELEXA) 20 MG Tab Take 1 Tab by mouth every day. 90 Tab 3   • enalapril (VASOTEC) 20 MG tablet Take 1 Tab by mouth 2 times a day. 180 Tab 3   • meclizine (ANTIVERT) 25 MG Tab Take 1 Tab by mouth 3 times a day as needed. 90 Tab 0   • albuterol (PROVENTIL) 2.5mg/3ml Nebu Soln solution for nebulization 3 mL by Nebulization route every four hours as needed for Shortness of Breath. 300 mL 6   • PROAIR  (90 BASE) MCG/ACT Aero Soln inhalation aerosol Inhale 2 Puffs by mouth every 6 hours as needed for Shortness of Breath. 1 Inhaler 3   • ipratropium (ATROVENT) 0.02 % Solution 3 mL by Nebulization route 2 times a day. 100 Vial 6     No current facility-administered medications for this visit.         Allergies as of 05/25/2017   • (No Known Allergies)        ROS: As per HPI.    /76 mmHg  Pulse 61  Temp(Src) 36.6 °C (97.9 °F)  Resp 16  Ht 1.626 m (5' 4.02\")  Wt 83.008 kg (183 lb)  BMI 31.40 kg/m2  SpO2 94%    Physical Exam:  Gen:         Alert and oriented, No apparent distress.  Neck:        No Lymphadenopathy or Bruits.  Lungs:     Clear to auscultation bilaterally  CV:          Regular rate and rhythm. No murmurs, rubs or gallops.               Ext:          No clubbing, cyanosis, edema.      Assessment and Plan.   77 y.o. female with the following issues.    1. Late onset Alzheimer's disease without behavioral disturbance  Tolerating medication no change therapy.    2. Pain in both feet  Refer to podiatry  - REFERRAL TO PODIATRY    3. Nail thickening  Referred to podiatry  - REFERRAL TO PODIATRY        "

## 2017-05-25 NOTE — MR AVS SNAPSHOT
"        [place]   2017 9:00 AM   Office Visit   MRN: [id number]    Department:  [address] [place] [address]   Dept Phone:  [phone]    Description:  Female : 1940   Provider:  Orestes Warren M.D.           Reason for Visit     Referral Needed eye exam, podiatry       Allergies as of 2017     No Known Allergies      You were diagnosed with     Late onset Alzheimer's disease without behavioral disturbance   [2974391]       Pain in both feet   [060468]       Nail thickening   [004307]         Vital Signs     Blood Pressure Pulse Temperature Respirations Height Weight    144/76 mmHg 61 36.6 °C (97.9 °F) 16 1.626 m (5' 4.02\") 83.008 kg (183 lb)    Body Mass Index Oxygen Saturation Smoking Status             31.40 kg/m2 94% Never Smoker          Basic Information     Date Of Birth Sex Race Ethnicity Preferred Language    1940 Female White Non- English      Your appointments     2017  9:30 AM   BONE DENSITY (DEXASCAN) with [facility] BD 1   [facility] ([address])    [address] [place] [address] [address]  [facility] [address] [phone]           No calcium supplements 24 hours prior to exam, including antacids or multivitamins w/calcium.  Pt may eat and drink normally.  No injection procedures prior to Dexa on the same day.  No barium contrast, no CTs with IV or oral contrast, no Pet/CTs and no Nuc Med injections for 7 days prior to a Dexa (including Barium Swallow, Upper GI, Small Bowel Series).  If scheduling a Dexa on the same day as a CT with IV or oral contrast, any test with barium, Pet/CT or a Nuc Med with injection, always schedule the Dexa before the other study.            Aug 18, 2017  9:00 AM   Follow Up Visit with Tom Guerra M.D.   [facility] (--)    [address], [address]  [facility] [address] [phone]           You will be receiving a confirmation call a few days before your appointment from our automated call confirmation " system.            Sep 11, 2017 11:40 AM   Established Patient with Orestes Warren M.D.   [facility] [address]    [address]  [address]  [place] [address] [phone]           You will be receiving a confirmation call a few days before your appointment from our automated call confirmation system.              Problem List              ICD-10-CM Priority Class Noted - Resolved    Essential hypertension I10   1/25/2017 - Present    Chronic left shoulder pain M25.512, G89.29   1/25/2017 - Present    Acquired hypothyroidism E03.9   1/25/2017 - Present    Dyslipidemia E78.5   1/25/2017 - Present    Episode of recurrent major depressive disorder (CMS-HCC) F33.9   1/25/2017 - Present    Chronic obstructive pulmonary disease (CMS-HCC) J44.9   1/25/2017 - Present    Non morbid obesity due to excess calories E66.09   1/25/2017 - Present    Alzheimer's dementia without behavioral disturbance G30.9, F02.80   2/8/2017 - Present    IGT (impaired glucose tolerance) R73.02   4/3/2017 - Present    Vitamin B12 deficiency E53.8   4/21/2017 - Present      Health Maintenance        Date Due Completion Dates    IMM DTaP/Tdap/Td Vaccine (1 - Tdap) 5/21/1957 ---    IMM ZOSTER VACCINE 5/21/1998 ---    BONE DENSITY 5/21/2003 ---    IMM PNEUMOCOCCAL 65+ (ADULT) LOW/MEDIUM RISK SERIES (2 of 2 - PPSV23) 6/23/2017 6/23/2016            Current Immunizations     13-VALENT PCV PREVNAR 6/23/2016    Influenza Vaccine Adult HD 9/25/2016    Influenza Vaccine Quad Inj (Preserved) 10/13/2014, 9/14/2013      Below and/or attached are the medications your provider expects you to take. Review all of your home medications and newly ordered medications with your provider and/or pharmacist. Follow medication instructions as directed by your provider and/or pharmacist. Please keep your medication list with you and share with your provider. Update the information when medications are discontinued, doses are changed, or new  medications (including over-the-counter products) are added; and carry medication information at all times in the event of emergency situations     Allergies:  No Known Allergies          Medications  Valid as of: May 25, 2017 -  9:37 AM    Generic Name Brand Name Tablet Size Instructions for use    Acetaminophen (Tab) TYLENOL 500 MG Take 1-2 Tabs by mouth every 6 hours as needed.        Albuterol Sulfate (Nebu Soln) PROVENTIL 2.5mg/3ml 3 mL by Nebulization route every four hours as needed for Shortness of Breath.        Albuterol Sulfate (Aero Soln) PROAIR  (90 BASE) MCG/ACT Inhale 2 Puffs by mouth every 6 hours as needed for Shortness of Breath.        AmLODIPine Besylate (Tab) NORVASC 10 MG Take 1 Tab by mouth every day.        Benzonatate (Cap) TESSALON 100 MG Take 1 Cap by mouth 3 times a day as needed for Cough.        Budesonide-Formoterol Fumarate (Aerosol) SYMBICORT 160-4.5 MCG/ACT Inhale 1 Puff by mouth 2 Times a Day.        Citalopram Hydrobromide (Tab) CELEXA 20 MG Take 1 Tab by mouth every day.        Enalapril Maleate (Tab) VASOTEC 20 MG Take 1 Tab by mouth 2 times a day.        Folic Acid (Tab) FOLVITE 1 MG Take 1 Tab by mouth every day.        Ipratropium Bromide (Solution) ATROVENT 0.02 % 3 mL by Nebulization route 2 times a day.        Levothyroxine Sodium (Tab) SYNTHROID 125 MCG Take 1 Tab by mouth Every morning on an empty stomach.        Meclizine HCl (Tab) ANTIVERT 25 MG Take 1 Tab by mouth 3 times a day as needed.        Memantine HCl (Tab) NAMENDA 5 MG Take 1 Tab by mouth 2 times a day.        MethylPREDNISolone (Tablet Therapy Pack) MEDROL DOSEPAK 4 MG Per package insert.        Metoprolol Succinate (TABLET SR 24 HR) TOPROL  MG Take 1 Tab by mouth every day.        Pravastatin Sodium (Tab) PRAVACHOL 40 MG Take 1 Tab by mouth every day.        .                 Medicines prescribed today were sent to:     [facility] [address] [place] [address], [place] [address] [address]  [facility] [address]    Phone: [phone] Fax: [phone]    Open 24 Hours?: No      Medication refill instructions:       If your prescription bottle indicates you have medication refills left, it is not necessary to call your provider’s office. Please contact your pharmacy and they will refill your medication.    If your prescription bottle indicates you do not have any refills left, you may request refills at any time through one of the following ways: The online GoodThreads system (except Urgent Care), by calling your provider’s office, or by asking your pharmacy to contact your provider’s office with a refill request. Medication refills are processed only during regular business hours and may not be available until the next business day. Your provider may request additional information or to have a follow-up visit with you prior to refilling your medication.   *Please Note: Medication refills are assigned a new Rx number when refilled electronically. Your pharmacy may indicate that no refills were authorized even though a new prescription for the same medication is available at the pharmacy. Please request the medicine by name with the pharmacy before contacting your provider for a refill.        Referral     A referral request has been sent to our patient care coordination department. Please allow 3-5 business days for us to process this request and contact you either by phone or mail. If you do not hear from us by the 5th business day, please call us at [phone].           GoodThreads Status: Patient Declined

## 2017-06-22 ENCOUNTER — HOSPITAL ENCOUNTER (OUTPATIENT)
Dept: RADIOLOGY | Facility: MEDICAL CENTER | Age: 77
End: 2017-06-22
Attending: INTERNAL MEDICINE
Payer: MEDICARE

## 2017-06-22 DIAGNOSIS — Z78.0 POST-MENOPAUSAL: ICD-10-CM

## 2017-06-22 PROCEDURE — 77080 DXA BONE DENSITY AXIAL: CPT

## 2017-06-26 ENCOUNTER — TELEPHONE (OUTPATIENT)
Dept: MEDICAL GROUP | Facility: MEDICAL CENTER | Age: 77
End: 2017-06-26

## 2017-06-26 NOTE — TELEPHONE ENCOUNTER
Future Appointments       Provider Department Center    6/30/2017 11:40 AM Orestes Warren M.D. [facility] [address]    8/18/2017 9:00 AM Tom Guerra M.D. [facility]     9/11/2017 11:40 AM Orestes Warren M.D. [facility] [address]        ESTABLISHED PATIENT PRE-VISIT PLANNING     Note: Patient will not be contacted if there is no indication to call.     1.  Reviewed note from last office visit with PCP and/or other med group provider: Yes    2.  If any orders were placed at last visit, do we have Results/Consult Notes?        •  Labs - Labs were not ordered at last office visit.       •  Imaging - Imaging ordered, completed and results are in chart.       •  Referrals - Referral ordered, patient has NOT been seen.    3.  Immunizations were updated in [facility] using WebIZ?: Yes       •  Web Iz Recommendations: HEPATITIS A  MMR  PNEUMOVAX (PPSV23) TDAP ZOSTAVAX (Shingles)    4.  Patient is due for the following Health Maintenance Topics:   Health Maintenance Due   Topic Date Due   • PFT SCREENING-FEV1 AND FEV/FVC RATIO / SPIROMETRY SHOULD BE PERFORMED ANNUALLY  05/21/1956   • IMM DTaP/Tdap/Td Vaccine (1 - Tdap) 05/21/1957   • IMM ZOSTER VACCINE  05/21/1998   • IMM PNEUMOCOCCAL 65+ (ADULT) LOW/MEDIUM RISK SERIES (2 of 2 - PPSV23) 06/23/2017           5.  Patient was not informed to arrive 15 min prior to their scheduled appointment and bring in their medication bottles.

## 2017-06-30 ENCOUNTER — OFFICE VISIT (OUTPATIENT)
Dept: MEDICAL GROUP | Facility: MEDICAL CENTER | Age: 77
End: 2017-06-30
Payer: MEDICARE

## 2017-06-30 VITALS
HEART RATE: 66 BPM | SYSTOLIC BLOOD PRESSURE: 120 MMHG | RESPIRATION RATE: 16 BRPM | TEMPERATURE: 98.8 F | HEIGHT: 64 IN | WEIGHT: 183.2 LBS | DIASTOLIC BLOOD PRESSURE: 74 MMHG | BODY MASS INDEX: 31.28 KG/M2 | OXYGEN SATURATION: 91 %

## 2017-06-30 DIAGNOSIS — Z23 NEED FOR PNEUMOCOCCAL VACCINATION: ICD-10-CM

## 2017-06-30 DIAGNOSIS — E78.5 DYSLIPIDEMIA: ICD-10-CM

## 2017-06-30 DIAGNOSIS — E66.09 NON MORBID OBESITY DUE TO EXCESS CALORIES: ICD-10-CM

## 2017-06-30 DIAGNOSIS — M81.0 OSTEOPOROSIS WITHOUT CURRENT PATHOLOGICAL FRACTURE, UNSPECIFIED OSTEOPOROSIS TYPE: ICD-10-CM

## 2017-06-30 DIAGNOSIS — E03.9 ACQUIRED HYPOTHYROIDISM: ICD-10-CM

## 2017-06-30 PROCEDURE — 99214 OFFICE O/P EST MOD 30 MIN: CPT | Mod: 25 | Performed by: INTERNAL MEDICINE

## 2017-06-30 PROCEDURE — 90732 PPSV23 VACC 2 YRS+ SUBQ/IM: CPT | Performed by: INTERNAL MEDICINE

## 2017-06-30 PROCEDURE — G0009 ADMIN PNEUMOCOCCAL VACCINE: HCPCS | Performed by: INTERNAL MEDICINE

## 2017-06-30 RX ORDER — ALENDRONATE SODIUM 70 MG/1
70 TABLET ORAL
Qty: 4 TAB | Refills: 11 | Status: SHIPPED | OUTPATIENT
Start: 2017-06-30 | End: 2018-05-15 | Stop reason: SDUPTHER

## 2017-06-30 NOTE — PROGRESS NOTES
CC: Follow-up multiple issues.    HPI:   [place] presents today with the following.    1. Non morbid obesity due to excess calories  Weight is stable but persistently elevated. She is not exercising regularly but trying to watch her diet.    2. Osteoporosis without current pathological fracture, unspecified osteoporosis type  Recent bone densities shows T score in the osteopenic range however her risk of fracture in the hip is over 5%. She does have frequent falls.    3. Acquired hypothyroidism  Maintain on thyroid medication denying any hair or skin changes.    4. Dyslipidemia  Maintain on statin without myalgias.    5. Need for pneumococcal vaccination  Do for vaccination      Patient Active Problem List    Diagnosis Date Noted   • Osteoporosis without current pathological fracture 06/30/2017   • Vitamin B12 deficiency 04/21/2017   • IGT (impaired glucose tolerance) 04/03/2017   • Alzheimer's dementia without behavioral disturbance 02/08/2017   • Essential hypertension 01/25/2017   • Chronic left shoulder pain 01/25/2017   • Acquired hypothyroidism 01/25/2017   • Dyslipidemia 01/25/2017   • Episode of recurrent major depressive disorder (CMS-HCC) 01/25/2017   • Chronic obstructive pulmonary disease (CMS-HCC) 01/25/2017   • Non morbid obesity due to excess calories 01/25/2017       Current Outpatient Prescriptions   Medication Sig Dispense Refill   • alendronate (FOSAMAX) 70 MG Tab Take 1 Tab by mouth every 7 days. 4 Tab 11   • memantine (NAMENDA) 5 MG Tab Take 1 Tab by mouth 2 times a day. 60 Tab 3   • budesonide-formoterol (SYMBICORT) 160-4.5 MCG/ACT Aerosol Inhale 1 Puff by mouth 2 Times a Day. 1 Inhaler 6   • acetaminophen (TYLENOL) 500 MG Tab Take 1-2 Tabs by mouth every 6 hours as needed. 30 Tab 0   • amlodipine (NORVASC) 10 MG Tab Take 1 Tab by mouth every day. 90 Tab 3   • folic acid (FOLVITE) 1 MG Tab Take 1 Tab by mouth every day. 90 Tab 3   • levothyroxine (SYNTHROID) 125 MCG Tab Take 1 Tab by mouth Every  "morning on an empty stomach. 90 Tab 3   • pravastatin (PRAVACHOL) 40 MG tablet Take 1 Tab by mouth every day. 90 Tab 3   • metoprolol SR (TOPROL XL) 100 MG TABLET SR 24 HR Take 1 Tab by mouth every day. 90 Tab 3   • citalopram (CELEXA) 20 MG Tab Take 1 Tab by mouth every day. 90 Tab 3   • enalapril (VASOTEC) 20 MG tablet Take 1 Tab by mouth 2 times a day. 180 Tab 3   • meclizine (ANTIVERT) 25 MG Tab Take 1 Tab by mouth 3 times a day as needed. 90 Tab 0   • albuterol (PROVENTIL) 2.5mg/3ml Nebu Soln solution for nebulization 3 mL by Nebulization route every four hours as needed for Shortness of Breath. 300 mL 6   • PROAIR  (90 BASE) MCG/ACT Aero Soln inhalation aerosol Inhale 2 Puffs by mouth every 6 hours as needed for Shortness of Breath. 1 Inhaler 3   • ipratropium (ATROVENT) 0.02 % Solution 3 mL by Nebulization route 2 times a day. 100 Vial 6   • MethylPREDNISolone (MEDROL DOSEPAK) 4 MG Tablet Therapy Pack Per package insert. (Patient not taking: Reported on 6/30/2017) 21 Tab 1     No current facility-administered medications for this visit.         Allergies as of 06/30/2017   • (No Known Allergies)        ROS: As per HPI.    /74 mmHg  Pulse 66  Temp(Src) 37.1 °C (98.8 °F)  Resp 16  Ht 1.626 m (5' 4.02\")  Wt 83.099 kg (183 lb 3.2 oz)  BMI 31.43 kg/m2  SpO2 91%    Physical Exam:  Gen:         Alert and oriented, No apparent distress.  Neck:        No Lymphadenopathy or Bruits.  Lungs:     Clear to auscultation bilaterally  CV:          Regular rate and rhythm. No murmurs, rubs or gallops.               Ext:          No clubbing, cyanosis, edema.      Assessment and Plan.   77 y.o. female with the following issues.    1. Non morbid obesity due to excess calories  Discussed diet exercise and weight loss strategies. Have set a goal for 15 pounds in 6 months and will followup at that time.  - Patient identified as having weight management issue.  Appropriate orders and counseling given.  - " alendronate (FOSAMAX) 70 MG Tab; Take 1 Tab by mouth every 7 days.  Dispense: 4 Tab; Refill: 11    2. Osteoporosis without current pathological fracture, unspecified osteoporosis type  Discussions today about benefits and risks of medications she is willing to try Fosamax cautioned about side effects. She also begin taking calcium and vitamin D.  - Patient identified as having weight management issue.  Appropriate orders and counseling given.  - alendronate (FOSAMAX) 70 MG Tab; Take 1 Tab by mouth every 7 days.  Dispense: 4 Tab; Refill: 11    3. Acquired hypothyroidism  Recheck next lab draw  - TSH; Future    4. Dyslipidemia  Continue statins recheck next visit.  - COMP METABOLIC PANEL; Future  - LIPID PROFILE; Future    5. Need for pneumococcal vaccination    - PNEUMOCOCCAL CONJUGATE VACCINE 23

## 2017-06-30 NOTE — MR AVS SNAPSHOT
"        [place] Ernst   2017 11:40 AM   Office Visit   MRN: [id number]    Department:  [address] [place] [address]   Dept Phone:  [phone]    Description:  Female : 1940   Provider:  Orestes Warren M.D.           Reason for Visit     Results review lab results      Allergies as of 2017     No Known Allergies      You were diagnosed with     Non morbid obesity due to excess calories   [8869388]       Osteoporosis without current pathological fracture, unspecified osteoporosis type   [3735916]       Acquired hypothyroidism   [0287880]       Dyslipidemia   [574323]       Need for pneumococcal vaccination   [571304]         Vital Signs     Blood Pressure Pulse Temperature Respirations Height Weight    120/74 mmHg 66 37.1 °C (98.8 °F) 16 1.626 m (5' 4.02\") 83.099 kg (183 lb 3.2 oz)    Body Mass Index Oxygen Saturation Smoking Status             31.43 kg/m2 91% Never Smoker          Basic Information     Date Of Birth Sex Race Ethnicity Preferred Language    1940 Female White Non- English      Your appointments     Aug 18, 2017  9:00 AM   Follow Up Visit with Tom Guerra M.D.   [facility] (--)    [address], [address]  [facility] [id number] [phone]           You will be receiving a confirmation call a few days before your appointment from our automated call confirmation system.            Sep 11, 2017 11:40 AM   Established Patient with Orestes Warren M.D.   [facility] [address]    [address] [facility] [address]  [facility] [id number] [phone]           You will be receiving a confirmation call a few days before your appointment from our automated call confirmation system.            2018 10:00 AM   Established Patient with Orestes Warren M.D.   [facility] [address]    [address] [facility] [address]  [employer] [place] [id number] [phone]           You will be receiving a confirmation call a few days before your " appointment from our automated call confirmation system.              Problem List              ICD-10-CM Priority Class Noted - Resolved    Essential hypertension I10   1/25/2017 - Present    Chronic left shoulder pain M25.512, G89.29   1/25/2017 - Present    Acquired hypothyroidism E03.9   1/25/2017 - Present    Dyslipidemia E78.5   1/25/2017 - Present    Episode of recurrent major depressive disorder (CMS-HCC) F33.9   1/25/2017 - Present    Chronic obstructive pulmonary disease (CMS-HCC) J44.9   1/25/2017 - Present    Non morbid obesity due to excess calories E66.09   1/25/2017 - Present    Alzheimer's dementia without behavioral disturbance G30.9, F02.80   2/8/2017 - Present    IGT (impaired glucose tolerance) R73.02   4/3/2017 - Present    Vitamin B12 deficiency E53.8   4/21/2017 - Present    Osteoporosis without current pathological fracture M81.0   6/30/2017 - Present      Health Maintenance        Date Due Completion Dates    IMM DTaP/Tdap/Td Vaccine (1 - Tdap) 5/21/1957 ---    IMM ZOSTER VACCINE 5/21/1998 ---    IMM PNEUMOCOCCAL 65+ (ADULT) LOW/MEDIUM RISK SERIES (2 of 2 - PPSV23) 6/23/2017 6/23/2016    BONE DENSITY 6/22/2019 6/22/2017            Current Immunizations     13-VALENT PCV PREVNAR  Incomplete, 6/23/2016    Influenza Vaccine Adult HD 9/25/2016    Influenza Vaccine Quad Inj (Preserved) 10/13/2014, 9/14/2013      Below and/or attached are the medications your provider expects you to take. Review all of your home medications and newly ordered medications with your provider and/or pharmacist. Follow medication instructions as directed by your provider and/or pharmacist. Please keep your medication list with you and share with your provider. Update the information when medications are discontinued, doses are changed, or new medications (including over-the-counter products) are added; and carry medication information at all times in the event of emergency situations     Allergies:  No Known Allergies            Medications  Valid as of: June 30, 2017 - 11:58 AM    Generic Name Brand Name Tablet Size Instructions for use    Acetaminophen (Tab) TYLENOL 500 MG Take 1-2 Tabs by mouth every 6 hours as needed.        Albuterol Sulfate (Nebu Soln) PROVENTIL 2.5mg/3ml 3 mL by Nebulization route every four hours as needed for Shortness of Breath.        Albuterol Sulfate (Aero Soln) PROAIR  (90 BASE) MCG/ACT Inhale 2 Puffs by mouth every 6 hours as needed for Shortness of Breath.        Alendronate Sodium (Tab) FOSAMAX 70 MG Take 1 Tab by mouth every 7 days.        AmLODIPine Besylate (Tab) NORVASC 10 MG Take 1 Tab by mouth every day.        Budesonide-Formoterol Fumarate (Aerosol) SYMBICORT 160-4.5 MCG/ACT Inhale 1 Puff by mouth 2 Times a Day.        Citalopram Hydrobromide (Tab) CELEXA 20 MG Take 1 Tab by mouth every day.        Enalapril Maleate (Tab) VASOTEC 20 MG Take 1 Tab by mouth 2 times a day.        Folic Acid (Tab) FOLVITE 1 MG Take 1 Tab by mouth every day.        Ipratropium Bromide (Solution) ATROVENT 0.02 % 3 mL by Nebulization route 2 times a day.        Levothyroxine Sodium (Tab) SYNTHROID 125 MCG Take 1 Tab by mouth Every morning on an empty stomach.        Meclizine HCl (Tab) ANTIVERT 25 MG Take 1 Tab by mouth 3 times a day as needed.        Memantine HCl (Tab) NAMENDA 5 MG Take 1 Tab by mouth 2 times a day.        MethylPREDNISolone (Tablet Therapy Pack) MEDROL DOSEPAK 4 MG Per package insert.        Metoprolol Succinate (TABLET SR 24 HR) TOPROL  MG Take 1 Tab by mouth every day.        Pravastatin Sodium (Tab) PRAVACHOL 40 MG Take 1 Tab by mouth every day.        .                 Medicines prescribed today were sent to:     [facility] [address] [place] [id number]    Phone: [phone] Fax: [phone]    Open 24 Hours?: No      Medication refill instructions:       If your prescription bottle indicates you have medication  refills left, it is not necessary to call your provider’s office. Please contact your pharmacy and they will refill your medication.    If your prescription bottle indicates you do not have any refills left, you may request refills at any time through one of the following ways: The online KIT digital system (except Urgent Care), by calling your provider’s office, or by asking your pharmacy to contact your provider’s office with a refill request. Medication refills are processed only during regular business hours and may not be available until the next business day. Your provider may request additional information or to have a follow-up visit with you prior to refilling your medication.   *Please Note: Medication refills are assigned a new Rx number when refilled electronically. Your pharmacy may indicate that no refills were authorized even though a new prescription for the same medication is available at the pharmacy. Please request the medicine by name with the pharmacy before contacting your provider for a refill.        Your To Do List     Future Labs/Procedures Complete By Expires    COMP METABOLIC PANEL  As directed 7/1/2018    LIPID PROFILE  As directed 7/1/2018    TSH  As directed 7/1/2018         KIT digital Status: Patient Declined

## 2017-08-18 ENCOUNTER — OFFICE VISIT (OUTPATIENT)
Dept: NEUROLOGY | Facility: MEDICAL CENTER | Age: 77
End: 2017-08-18
Payer: MEDICARE

## 2017-08-18 VITALS
HEART RATE: 64 BPM | SYSTOLIC BLOOD PRESSURE: 120 MMHG | BODY MASS INDEX: 30.88 KG/M2 | OXYGEN SATURATION: 96 % | RESPIRATION RATE: 16 BRPM | DIASTOLIC BLOOD PRESSURE: 60 MMHG | HEIGHT: 64 IN | WEIGHT: 180.9 LBS | TEMPERATURE: 98.4 F

## 2017-08-18 DIAGNOSIS — F02.80 LATE ONSET ALZHEIMER'S DISEASE WITHOUT BEHAVIORAL DISTURBANCE (HCC): ICD-10-CM

## 2017-08-18 DIAGNOSIS — G30.1 LATE ONSET ALZHEIMER'S DISEASE WITHOUT BEHAVIORAL DISTURBANCE (HCC): ICD-10-CM

## 2017-08-18 PROCEDURE — 99214 OFFICE O/P EST MOD 30 MIN: CPT | Performed by: PSYCHIATRY & NEUROLOGY

## 2017-08-18 NOTE — PROGRESS NOTES
NEUROLOGY NOTE    Referring Physician  Orestes Warren      CHIEF COMPLAINT:  Memory problems for years-- just moved to [place] from [facility]  She was told to have vit B12 deficiency  She also has daily headache      Chief Complaint   Patient presents with   • Follow-Up     Acquired hypothyroism       PRESENT ILLNESS:     Used to live in [place]--- 2017, moved back to [place]    Memory problems for years-- just moved to [place] from [facility]  She was told to have vit B12 deficiency  She also has daily headache    She also had COPD  PAST MEDICAL HISTORY:  No past medical history on file.    PAST SURGICAL HISTORY:  No past surgical history on file.    FAMILY HISTORY:  Family History   Problem Relation Age of Onset   • Stroke Mother    • No Known Problems Father    • Heart Disease Brother        SOCIAL HISTORY:  Social History     Social History   • Marital Status:      Spouse Name: N/A   • Number of Children: N/A   • Years of Education: N/A     Occupational History   • Not on file.     Social History Main Topics   • Smoking status: Never Smoker    • Smokeless tobacco: Never Used   • Alcohol Use: No   • Drug Use: No   • Sexual Activity:     Partners: Male     Other Topics Concern   • Not on file     Social History Narrative     ALLERGIES:  No Known Allergies  TOBHX  History   Smoking status   • Never Smoker    Smokeless tobacco   • Never Used     ALCHX  History   Alcohol Use No     DRUGHX  History   Drug Use No           MEDICATIONS:  Current Outpatient Prescriptions   Medication   • memantine (NAMENDA) 5 MG Tab   • budesonide-formoterol (SYMBICORT) 160-4.5 MCG/ACT Aerosol   • amlodipine (NORVASC) 10 MG Tab   • folic acid (FOLVITE) 1 MG Tab   • levothyroxine (SYNTHROID) 125 MCG Tab   • pravastatin (PRAVACHOL) 40 MG tablet   • metoprolol SR (TOPROL XL) 100 MG TABLET SR 24 HR   • citalopram (CELEXA) 20 MG Tab   • enalapril (VASOTEC) 20 MG tablet   • albuterol (PROVENTIL) 2.5mg/3ml Nebu Soln solution for  "nebulization   • ipratropium (ATROVENT) 0.02 % Solution   • alendronate (FOSAMAX) 70 MG Tab   • MethylPREDNISolone (MEDROL DOSEPAK) 4 MG Tablet Therapy Pack   • acetaminophen (TYLENOL) 500 MG Tab   • meclizine (ANTIVERT) 25 MG Tab   • PROAIR  (90 BASE) MCG/ACT Aero Soln inhalation aerosol     No current facility-administered medications for this visit.       REVIEW OF SYSTEM:    Constitutional: Denies fevers, Denies weight changes   Eyes: Denies changes in vision, no eye pain   Ears/Nose/Throat/Mouth: Denies nasal congestion or sore throat   Cardiovascular: Denies chest pain or palpitations   Respiratory: COPD  Gastrointestinal/Hepatic: Denies abdominal pain, nausea, vomiting, diarrhea, constipation or GI bleeding   Genitourinary: Denies bladder dysfunction, dysuria or frequency   Musculoskeletal/Rheum: Denies joint pain and swelling   Skin/Breast: Denies rash, denies breast lumps or discharge   Neurological: Headache, DiZziness( dizziness spells made her sleepy)  Psychiatric: denies mood disorder   Endocrine: denies hx of diabetes or thyroid dysfunction   Heme/Oncology/Lymph Nodes: Denies enlarged lymph nodes, denies brusing or known bleeding disorder   Allergic/Immunologic: Denies hx of allergies         PHYSICAL AND NEUROLOGICAL EXMAINATIONS:  VITAL SIGNS: /60 mmHg  Pulse 64  Temp(Src) 36.9 °C (98.4 °F)  Resp 16  Ht 1.626 m (5' 4.02\")  Wt 82.056 kg (180 lb 14.4 oz)  BMI 31.04 kg/m2  SpO2 96%  CURRENT WEIGHT:  BMI: Body mass index is 31.04 kg/(m^2).  PREVIOUS WEIGHTS:  Wt Readings from Last 25 Encounters:   08/18/17 82.056 kg (180 lb 14.4 oz)   06/30/17 83.099 kg (183 lb 3.2 oz)   05/25/17 83.008 kg (183 lb)   04/21/17 82.918 kg (182 lb 12.8 oz)   04/03/17 81.647 kg (180 lb)   02/22/17 85.73 kg (189 lb)   02/08/17 85.276 kg (188 lb)   01/25/17 86.637 kg (191 lb)       General appearance of patient: WDWN(+) NAD(+)    EYES  o Fundus : Papilledem(-) Exudates(-) Hemorrhage(-)  Nervous " System  Orientation to time, place and person(+)  Memory normal(-)  Language: aphasia(-)  Knowledge: past(+) Current(+)  Attention(+)  Cranial Nerves  • Nerve 2: intact  • Nerve 3,4,6: intact  • Nerve 5 : intact  • Nerve 7: intact  • Nerve 8: intact  • Nerve 9 & 10: intact  • Nerve 11: intact  • Nerve 12: intact  Muscle Power and muscle tone: symmetric, normal in upper and lower  Sensory System: Pin sensation intact(+)  Reflexes: symmetric throughout  Cerebellar Function FNP normal   Gait : Steady(+) TandemGait steady(+)  Heart and Vascular  Peripheral Vasucular system : Edema (-) Swelling(-)  RHB, Breathing sound clear  abdomen bowel sound normoactive  Extremities freely moveable  Joints no contracture         NEUROIMAGING: I reviewed the MRI/CT of brain     Denied falling since last visit    Headache not a big issue  Dizziness remains an issue    Recall 2/3 Registration 3/3     IMPRESSION:    1. Memory problem for years-- her former neurologist was in [facility]  2. Hx of Vit B12 deficiency ( diagnosed in [place]), daily Headache, Dizziness  3. Hx of hypothyroidism, COPD, HTN. Depression, Hearing impairment    PLAN/RECOMMENDATIONS:    Namenda 5mg made her sleepy-- the patient did not take Namenda faithfully    Dizziness spells persist but not disabling?    This time, we will offer MRI of brain and EEG ( dementia, dizziness spells and positive TPO antibody)    Advise the following  ________________________________________________________________________    Fish Oil -- Omega 3 1000mg  3# daily  Vit D-3 4000 unit daily  ________________________________________________________________________      ________________________________________________________________________    Advise exercise muscle and brain  Avoid processed foods-- focus on natural foods  Avoid sugar      ________________________________________________________________________              Results for MATTHIEU GONZALEZ (MRN [id number]) as of 8/18/2017  09:06   Ref. Range 4/21/2017 10:10   25-Hydroxy   Vitamin D 25 Latest Ref Range:  ng/mL 26 (L)   Microsomal -Tpo- Abs Latest Ref Range: 0.0-9.0 IU/mL 511.2 (H)   Anti-Thyroglobulin Latest Ref Range: 0.0-4.0 IU/mL <0.9   Antinuclear Antibody Latest Ref Range: None Detected  None Detected       SIGNATURE:  Tom Guerra      CC:  Orestes Warren M.D.

## 2017-08-18 NOTE — PATIENT INSTRUCTIONS
Headache not a big issue  Dizziness remains an issue    Recall 2/3 Registration 3/3     IMPRESSION:    1. Memory problem for years-- her former neurologist was in [facility]  2. Hx of Vit B12 deficiency ( diagnosed in [place]), daily Headache, Dizziness  3. Hx of hypothyroidism, COPD, HTN. Depression, Hearing impairment    PLAN/RECOMMENDATIONS:    Namenda 5mg made her sleepy-- the patient did not take Namenda faithfully    Dizziness spells persist but not disabling?    This time, we will offer MRI of brain and EEG ( dementia, dizziness spells and positive TPO antibody)    Advise the following  ________________________________________________________________________    Fish Oil -- Omega 3 1000mg  3# daily  Vit D-3 4000 unit daily  ________________________________________________________________________      ________________________________________________________________________    Advise exercise muscle and brain  Avoid processed foods-- focus on natural foods  Avoid sugar      ________________________________________________________________________              Results for MATTHIEU GONZALEZ ZIGGY (MRN [id number]) as of 8/18/2017 09:06   Ref. Range 4/21/2017 10:10   25-Hydroxy   Vitamin D 25 Latest Ref Range:  ng/mL 26 (L)   Microsomal -Tpo- Abs Latest Ref Range: 0.0-9.0 IU/mL 511.2 (H)   Anti-Thyroglobulin Latest Ref Range: 0.0-4.0 IU/mL <0.9   Antinuclear Antibody Latest Ref Range: None Detected  None Detected       SIGNATURE:  Tom Guerra      CC:  Orestes Warren M.D.

## 2017-08-18 NOTE — MR AVS SNAPSHOT
"[place] Ernst   2017 9:00 AM   Office Visit   MRN: [id number]    Department:  [facility]   Dept Phone:  [phone]    Description:  Female : 1940   Provider:  Tom Guerra M.D.           Reason for Visit     Follow-Up Acquired hypothyroism      Allergies as of 2017     No Known Allergies      You were diagnosed with     Late onset Alzheimer's disease without behavioral disturbance   [[id number]]         Vital Signs     Blood Pressure Pulse Temperature Respirations Height Weight    120/60 mmHg 64 36.9 °C (98.4 °F) 16 1.626 m (5' 4.02\") 82.056 kg (180 lb 14.4 oz)    Body Mass Index Oxygen Saturation Smoking Status             31.04 kg/m2 96% Never Smoker          Basic Information     Date Of Birth Sex Race Ethnicity Preferred Language    1940 Female White Non- English      Your appointments     Dec 20, 2017  8:20 AM   Follow Up Visit with Tom Guerra M.D.   [facility] (--)    [address], [address]  [facility] [address] [phone]           You will be receiving a confirmation call a few days before your appointment from our automated call confirmation system.            2018 10:00 AM   Established Patient with Orestes Warren M.D.   [facility] [address]    [address]  [address] [address]  [facility] [id number] [phone]           You will be receiving a confirmation call a few days before your appointment from our automated call confirmation system.              Problem List              ICD-10-CM Priority Class Noted - Resolved    Essential hypertension I10   2017 - Present    Chronic left shoulder pain M25.512, G89.29   2017 - Present    Acquired hypothyroidism E03.9   2017 - Present    Dyslipidemia E78.5   2017 - Present    Episode of recurrent major depressive disorder (CMS-HCC) F33.9   2017 - Present    Chronic obstructive pulmonary disease (CMS-HCC) J44.9   2017 - Present    Non " morbid obesity due to excess calories E66.09   1/25/2017 - Present    Alzheimer's dementia without behavioral disturbance G30.9, F02.80   2/8/2017 - Present    IGT (impaired glucose tolerance) R73.02   4/3/2017 - Present    Vitamin B12 deficiency E53.8   4/21/2017 - Present    Osteoporosis without current pathological fracture M81.0   6/30/2017 - Present      Health Maintenance        Date Due Completion Dates    IMM DTaP/Tdap/Td Vaccine (1 - Tdap) 5/21/1957 ---    IMM ZOSTER VACCINE 5/21/1998 ---    IMM INFLUENZA (1) 9/1/2017 9/25/2016, 10/13/2014, 9/14/2013    BONE DENSITY 6/22/2019 6/22/2017            Current Immunizations     13-VALENT PCV PREVNAR 6/23/2016    Influenza Vaccine Adult HD 9/25/2016    Influenza Vaccine Quad Inj (Preserved) 10/13/2014, 9/14/2013    Pneumococcal polysaccharide vaccine (PPSV-23) 6/30/2017      Below and/or attached are the medications your provider expects you to take. Review all of your home medications and newly ordered medications with your provider and/or pharmacist. Follow medication instructions as directed by your provider and/or pharmacist. Please keep your medication list with you and share with your provider. Update the information when medications are discontinued, doses are changed, or new medications (including over-the-counter products) are added; and carry medication information at all times in the event of emergency situations     Allergies:  No Known Allergies          Medications  Valid as of: August 18, 2017 -  9:24 AM    Generic Name Brand Name Tablet Size Instructions for use    Acetaminophen (Tab) TYLENOL 500 MG Take 1-2 Tabs by mouth every 6 hours as needed.        Albuterol Sulfate (Nebu Soln) PROVENTIL 2.5mg/3ml 3 mL by Nebulization route every four hours as needed for Shortness of Breath.        Albuterol Sulfate (Aero Soln) PROAIR  (90 Base) MCG/ACT Inhale 2 Puffs by mouth every 6 hours as needed for Shortness of Breath.        Alendronate Sodium  (Tab) FOSAMAX 70 MG Take 1 Tab by mouth every 7 days.        AmLODIPine Besylate (Tab) NORVASC 10 MG Take 1 Tab by mouth every day.        Budesonide-Formoterol Fumarate (Aerosol) SYMBICORT 160-4.5 MCG/ACT Inhale 1 Puff by mouth 2 Times a Day.        Citalopram Hydrobromide (Tab) CELEXA 20 MG Take 1 Tab by mouth every day.        Enalapril Maleate (Tab) VASOTEC 20 MG Take 1 Tab by mouth 2 times a day.        Folic Acid (Tab) FOLVITE 1 MG Take 1 Tab by mouth every day.        Ipratropium Bromide (Solution) ATROVENT 0.02 % 3 mL by Nebulization route 2 times a day.        Levothyroxine Sodium (Tab) SYNTHROID 125 MCG Take 1 Tab by mouth Every morning on an empty stomach.        Meclizine HCl (Tab) ANTIVERT 25 MG Take 1 Tab by mouth 3 times a day as needed.        Memantine HCl (Tab) NAMENDA 5 MG Take 1 Tab by mouth 2 times a day.        MethylPREDNISolone (Tablet Therapy Pack) MEDROL DOSEPAK 4 MG Per package insert.        Metoprolol Succinate (TABLET SR 24 HR) TOPROL  MG Take 1 Tab by mouth every day.        Pravastatin Sodium (Tab) PRAVACHOL 40 MG Take 1 Tab by mouth every day.        .                 Medicines prescribed today were sent to:     [facility] [address] [place] [id number]    Phone: [phone] Fax: [phone]    Open 24 Hours?: No      Medication refill instructions:       If your prescription bottle indicates you have medication refills left, it is not necessary to call your provider’s office. Please contact your pharmacy and they will refill your medication.    If your prescription bottle indicates you do not have any refills left, you may request refills at any time through one of the following ways: The online [employer] system (except Urgent Care), by calling your provider’s office, or by asking your pharmacy to contact your provider’s office with a refill request. Medication refills are processed only during regular business hours  and may not be available until the next business day. Your provider may request additional information or to have a follow-up visit with you prior to refilling your medication.   *Please Note: Medication refills are assigned a new Rx number when refilled electronically. Your pharmacy may indicate that no refills were authorized even though a new prescription for the same medication is available at the pharmacy. Please request the medicine by name with the pharmacy before contacting your provider for a refill.        Your To Do List     Future Labs/Procedures Complete By Expires    MR-BRAIN-W/O  As directed 2/17/2018      Referral     A referral request has been sent to our patient care coordination department. Please allow 3-5 business days for us to process this request and contact you either by phone or mail. If you do not hear from us by the 5th business day, please call us at [phone].        Instructions      Headache not a big issue  Dizziness remains an issue    Recall 2/3 Registration 3/3     IMPRESSION:    1. Memory problem for years-- her former neurologist was in [facility]  2. Hx of Vit B12 deficiency ( diagnosed in [place]), daily Headache, Dizziness  3. Hx of hypothyroidism, COPD, HTN. Depression, Hearing impairment    PLAN/RECOMMENDATIONS:    Namenda 5mg made her sleepy-- the patient did not take Namenda faithfully    Dizziness spells persist but not disabling?    This time, we will offer MRI of brain and EEG ( dementia, dizziness spells and positive TPO antibody)    Advise the following  ________________________________________________________________________    Fish Oil -- Omega 3 1000mg  3# daily  Vit D-3 4000 unit daily  ________________________________________________________________________      ________________________________________________________________________    Advise exercise muscle and brain  Avoid processed foods-- focus on natural foods  Avoid  sugar      ________________________________________________________________________              Results for MATTHIEU GONZALEZ (MRN [id number]) as of 8/18/2017 09:06   Ref. Range 4/21/2017 10:10   25-Hydroxy   Vitamin D 25 Latest Ref Range:  ng/mL 26 (L)   Microsomal -Tpo- Abs Latest Ref Range: 0.0-9.0 IU/mL 511.2 (H)   Anti-Thyroglobulin Latest Ref Range: 0.0-4.0 IU/mL <0.9   Antinuclear Antibody Latest Ref Range: None Detected  None Detected       SIGNATURE:  Tom Guerra      CC:  MARIELENA Alexis Status: Patient Declined

## 2017-08-19 DIAGNOSIS — F03.90 DEMENTIA WITHOUT BEHAVIORAL DISTURBANCE, UNSPECIFIED DEMENTIA TYPE: ICD-10-CM

## 2017-08-21 RX ORDER — MEMANTINE HYDROCHLORIDE 5 MG/1
TABLET ORAL
Qty: 180 TAB | Refills: 1 | Status: SHIPPED | OUTPATIENT
Start: 2017-08-21 | End: 2018-02-18 | Stop reason: SDUPTHER

## 2017-11-08 RX ORDER — BUDESONIDE AND FORMOTEROL FUMARATE DIHYDRATE 160; 4.5 UG/1; UG/1
AEROSOL RESPIRATORY (INHALATION)
Qty: 6 G | Refills: 6 | Status: SHIPPED | OUTPATIENT
Start: 2017-11-08 | End: 2019-08-13

## 2017-11-09 ENCOUNTER — PATIENT OUTREACH (OUTPATIENT)
Dept: HEALTH INFORMATION MANAGEMENT | Facility: OTHER | Age: 77
End: 2017-11-09

## 2017-11-09 NOTE — LETTER
November 9, 2017        [place] Ernst  [address] [facility] [address]        Dear [place]:    I am sorry I have been unable to reach you via phone. As a clinical pharmacist with [facility], I am working with your health care providers to ensure optimal medication therapy. This service is available to you at no cost.    By participating in this review, I will:     • Review your medications, vitamins and other over-the-counter items.    • Assure there are no harmful interactions with your medications.  • Lower your out-of-pocket prescription costs, if possible.   • Communicate medication concerns between your healthcare specialists.  • Provide you with wellness, healthy lifestyle, and disease prevention strategies.  • Refer you to a nurse or  if needed.  • Consider remote health monitoring if you have high blood pressure.  • Answer any questions that you may have about your medications.    Please contact me at [phone] to discuss your medications. I am available Monday through Friday from 8am to 5pm. I look forward to hearing from you.         Sincerely,        Geovanna Bower, JoséD    Electronically Signed

## 2017-11-09 NOTE — PROGRESS NOTES
Outbound call to [place] for medication review. Left a voice message requesting patient to call [phone]. Mailed letter to patient describing our service. Will follow-up when patient makes contact.     José JonasD

## 2017-12-05 ENCOUNTER — HOSPITAL ENCOUNTER (OUTPATIENT)
Dept: RADIOLOGY | Facility: MEDICAL CENTER | Age: 77
End: 2017-12-05
Attending: PSYCHIATRY & NEUROLOGY
Payer: MEDICARE

## 2017-12-05 ENCOUNTER — NON-PROVIDER VISIT (OUTPATIENT)
Dept: NEUROLOGY | Facility: MEDICAL CENTER | Age: 77
End: 2017-12-05
Payer: MEDICARE

## 2017-12-05 DIAGNOSIS — F02.80 LATE ONSET ALZHEIMER'S DISEASE WITHOUT BEHAVIORAL DISTURBANCE (HCC): ICD-10-CM

## 2017-12-05 DIAGNOSIS — G30.1 LATE ONSET ALZHEIMER'S DISEASE WITHOUT BEHAVIORAL DISTURBANCE (HCC): ICD-10-CM

## 2017-12-05 PROCEDURE — 70551 MRI BRAIN STEM W/O DYE: CPT

## 2017-12-05 PROCEDURE — 95816 EEG AWAKE AND DROWSY: CPT | Performed by: PSYCHIATRY & NEUROLOGY

## 2017-12-06 NOTE — PROGRESS NOTES
ROUTINE ELECTROENCEPHALOGRAM REPORT      NAME: [place] Ernst    REFERRING Dr:    INDICATION:   Memory problem for years-- her former neurologist was in [facility]  Hx of Vit B12 deficiency ( diagnosed in [place]), daily Headache, Dizziness Spells  Hx of hypothyroidism, COPD, HTN. Depression, Hearing impairment      TECHNIQUE: 30 channel routine electroencephalogram (EEG) was performed in accordance with the international 10-20 system. The study was reviewed in bipolar and referential montages. The recording examined the patient during wakeful and drowsy state(s).     DESCRIPTION OF THE RECORD:      Background rhythm during awake stage shows well-organized, well-developed, average voltage 7 to 8 hertz activity in the posterior regions.  It blocks with eye opening and it is bilaterally synchronous and symmetrical.  No spike-and-wave discharges or any lateralizing delta abnormalities are seen.  Photic stimulation did not produce any abnormalities. There are excessive muscle artifacts. Stage I sleep was achieved.      ACTIVATION PROCEDURES:      Photic Stimulation were done    ICTAL AND/OR INTERICTAL FINDINGS:    No focal or generalized epileptiform activity noted. No regional slowing was seen during this routine study.  No clinical events or seizures were reported or recorded during the study.      EKG: sampling of the EKG recording demonstrated sinus rhythm.        INTERPRETATION:    ________________________________________________________________________      ________________________________________________________________________    This scalp EEG is consistent with mild nonspecific cortical dysfunction ( slow posterior background rhythm)    This nonspecific abnormalities could be secondary to degeneration, medical disease, polypharmacy and etc    There are no epileptiform discharges in this record    Of note, unremarkable EEG does not completely exclude the diagnosis  of seizures since seizure is an  episodic phenomena.  Clinical correlation may help     If clinical suspicion of seizure remains high.  Prolonged outpatient EEG   monitoring may be of help.    ________________________________________________________________________

## 2017-12-06 NOTE — PROCEDURES
DATE OF SERVICE:  12/05/2017    The is an outpatient EEG was done on 12/5/2015.    ROUTINE ELECTROENCEPHALOGRAM REPORT        NAME: Klever Lampeqi Reynolds     REFERRING Dr:     INDICATION:   Memory problem for years-- her former neurologist was in [facility]  Hx of Vit B12 deficiency ( diagnosed in [place]), daily Headache, Dizziness Spells  Hx of hypothyroidism, COPD, HTN. Depression, Hearing impairment        TECHNIQUE: 30 channel routine electroencephalogram (EEG) was performed in accordance with the international 10-20 system. The study was reviewed in bipolar and referential montages. The recording examined the patient during wakeful and drowsy state(s).      DESCRIPTION OF THE RECORD:        Background rhythm during awake stage shows well-organized, well-developed, average voltage 7 to 8 hertz activity in the posterior regions.  It blocks with eye opening and it is bilaterally synchronous and symmetrical.  No spike-and-wave discharges or any lateralizing delta abnormalities are seen.  Photic stimulation did not produce any abnormalities. There are excessive muscle artifacts. Stage I sleep was achieved.        ACTIVATION PROCEDURES:       Photic Stimulation were done     ICTAL AND/OR INTERICTAL FINDINGS:    No focal or generalized epileptiform activity noted. No regional slowing was seen during this routine study.  No clinical events or seizures were reported or recorded during the study.       EKG: sampling of the EKG recording demonstrated sinus rhythm.          INTERPRETATION:     ________________________________________________________________________        ________________________________________________________________________     This scalp EEG is consistent with mild nonspecific cortical dysfunction ( slow posterior background rhythm)     This nonspecific abnormalities could be secondary to degeneration, medical disease, polypharmacy and etc     There are no epileptiform discharges in this  record     Of note, unremarkable EEG does not completely exclude the diagnosis  of seizures since seizure is an episodic phenomena.  Clinical correlation may help     If clinical suspicion of seizure remains high.  Prolonged outpatient EEG   monitoring may be of help.     ________________________________________________________________________                             ____________________________________     YEN-MD TERRENCE TSAI / TAN    DD:  12/05/2017 23:10:44  DT:  12/06/2017 00:24:29    D#:  [id number]  Job#:  [id number]

## 2017-12-20 ENCOUNTER — APPOINTMENT (OUTPATIENT)
Dept: NEUROLOGY | Facility: MEDICAL CENTER | Age: 77
End: 2017-12-20
Payer: MEDICARE

## 2017-12-21 ENCOUNTER — TELEPHONE (OUTPATIENT)
Dept: NEUROLOGY | Facility: MEDICAL CENTER | Age: 77
End: 2017-12-21

## 2017-12-21 NOTE — TELEPHONE ENCOUNTER
Patient's daughter-in law called would like EEG results. They canceled her appointment due to snow to get results.

## 2018-01-02 ENCOUNTER — OFFICE VISIT (OUTPATIENT)
Dept: MEDICAL GROUP | Facility: MEDICAL CENTER | Age: 78
End: 2018-01-02
Payer: MEDICARE

## 2018-01-02 VITALS
WEIGHT: 178 LBS | HEART RATE: 74 BPM | RESPIRATION RATE: 16 BRPM | BODY MASS INDEX: 30.39 KG/M2 | HEIGHT: 64 IN | SYSTOLIC BLOOD PRESSURE: 132 MMHG | DIASTOLIC BLOOD PRESSURE: 68 MMHG | OXYGEN SATURATION: 93 % | TEMPERATURE: 98.3 F

## 2018-01-02 DIAGNOSIS — J44.9 CHRONIC OBSTRUCTIVE PULMONARY DISEASE, UNSPECIFIED COPD TYPE (HCC): ICD-10-CM

## 2018-01-02 DIAGNOSIS — J44.1 COPD EXACERBATION (HCC): ICD-10-CM

## 2018-01-02 DIAGNOSIS — R42 DIZZINESS: ICD-10-CM

## 2018-01-02 PROCEDURE — 99214 OFFICE O/P EST MOD 30 MIN: CPT | Performed by: NURSE PRACTITIONER

## 2018-01-02 RX ORDER — METHYLPREDNISOLONE 4 MG/1
TABLET ORAL
Qty: 21 TAB | Refills: 1 | Status: SHIPPED | OUTPATIENT
Start: 2018-01-02 | End: 2018-02-14

## 2018-01-02 RX ORDER — DOXYCYCLINE HYCLATE 100 MG
100 TABLET ORAL 2 TIMES DAILY
Qty: 20 TAB | Refills: 0 | Status: SHIPPED | OUTPATIENT
Start: 2018-01-02 | End: 2018-01-12

## 2018-01-02 ASSESSMENT — ENCOUNTER SYMPTOMS
SHORTNESS OF BREATH: 1
WHEEZING: 1
COUGH: 1
DIZZINESS: 1

## 2018-01-02 NOTE — PROGRESS NOTES
Subjective:      [place] Ernst is a 77 y.o. female who presents with Other (cold/ lost voice, x 1 week) and Results (ECG)            HPI [place] Ernst Is here today accompanied by her son and  mainly for new problem with cough.      1. COPD exacerbation (CMS-[facility])  Patient symptoms started 7-8 days ago and her  states it is getting worse. Her symptoms include frequent cough, hoarse voice, wheezing and shortness of breath at times. Her symptoms are worse at night and improves slightly with albuterol. She does have history of COPD. She has had no syncope, obvious chest pain, increased edema, nausea or vomiting.    2. Chronic obstructive pulmonary disease, unspecified COPD type (CMS-[facility])  Patient has history of COPD and uses a nebulizer as needed. Patient's  states that the nebulizer is not working well and his old and they would like to get a new nebulizer for her albuterol.    3. Dizziness  Patient's son has some questions regarding patient's recent EEG. Apparently she has been having issues with dizziness and she had an MRI and EEG through neurology. They apparently missed their appointment and have some issues regarding the results and going on antiseizure medicines.  Social History   Substance Use Topics   • Smoking status: Never Smoker   • Smokeless tobacco: Never Used   • Alcohol use No     Current Outpatient Prescriptions   Medication Sig Dispense Refill   • MethylPREDNISolone (MEDROL DOSEPAK) 4 MG Tablet Therapy Pack Per package insert. 21 Tab 1   • doxycycline (VIBRAMYCIN) 100 MG Tab Take 1 Tab by mouth 2 times a day for 10 days. 20 Tab 0   • Misc. Devices Misc Nebulizer to use with albuterol QID PRN. 1 Each 11   • SYMBICORT 160-4.5 MCG/ACT Aerosol INHALE ONE PUFF BY MOUTH TWICE DAILY 6 g 6   • memantine (NAMENDA) 5 MG Tab TAKE ONE TABLET BY MOUTH TWICE DAILY 180 Tab 1   • alendronate (FOSAMAX) 70 MG Tab Take 1 Tab by mouth every 7 days. 4 Tab 11   • acetaminophen (TYLENOL) 500  "MG Tab Take 1-2 Tabs by mouth every 6 hours as needed. 30 Tab 0   • amlodipine (NORVASC) 10 MG Tab Take 1 Tab by mouth every day. 90 Tab 3   • folic acid (FOLVITE) 1 MG Tab Take 1 Tab by mouth every day. 90 Tab 3   • levothyroxine (SYNTHROID) 125 MCG Tab Take 1 Tab by mouth Every morning on an empty stomach. 90 Tab 3   • pravastatin (PRAVACHOL) 40 MG tablet Take 1 Tab by mouth every day. 90 Tab 3   • metoprolol SR (TOPROL XL) 100 MG TABLET SR 24 HR Take 1 Tab by mouth every day. 90 Tab 3   • citalopram (CELEXA) 20 MG Tab Take 1 Tab by mouth every day. 90 Tab 3   • enalapril (VASOTEC) 20 MG tablet Take 1 Tab by mouth 2 times a day. 180 Tab 3   • meclizine (ANTIVERT) 25 MG Tab Take 1 Tab by mouth 3 times a day as needed. 90 Tab 0   • albuterol (PROVENTIL) 2.5mg/3ml Nebu Soln solution for nebulization 3 mL by Nebulization route every four hours as needed for Shortness of Breath. 300 mL 6   • PROAIR  (90 BASE) MCG/ACT Aero Soln inhalation aerosol Inhale 2 Puffs by mouth every 6 hours as needed for Shortness of Breath. 1 Inhaler 3   • ipratropium (ATROVENT) 0.02 % Solution 3 mL by Nebulization route 2 times a day. 100 Vial 6     No current facility-administered medications for this visit.      Family History   Problem Relation Age of Onset   • Stroke Mother    • No Known Problems Father    • Heart Disease Brother    History reviewed. No pertinent past medical history.    Review of Systems   HENT: Positive for congestion.    Respiratory: Positive for cough, shortness of breath and wheezing.    Neurological: Positive for dizziness.   All other systems reviewed and are negative.         Objective:     /68   Pulse 74   Temp 36.8 °C (98.3 °F)   Resp 16   Ht 1.626 m (5' 4\")   Wt 80.7 kg (178 lb)   SpO2 93%   BMI 30.55 kg/m²      Physical Exam   Constitutional: She is oriented to person, place, and time. She appears well-developed and well-nourished. No distress.   HENT:   Head: Normocephalic and atraumatic. "   Right Ear: External ear normal.   Left Ear: External ear normal.   Nose: Nose normal.   Eyes: Right eye exhibits no discharge. Left eye exhibits no discharge.   Neck: Normal range of motion. Neck supple. No thyromegaly present.   Cardiovascular: Normal rate, regular rhythm and normal heart sounds.  Exam reveals no gallop and no friction rub.    No murmur heard.  Pulmonary/Chest: Effort normal. She has wheezes. She has no rales.   Musculoskeletal: She exhibits no edema or tenderness.   Neurological: She is alert and oriented to person, place, and time. She displays normal reflexes.   Skin: Skin is warm and dry. No rash noted. She is not diaphoretic.   Psychiatric: She has a normal mood and affect. Her behavior is normal. Judgment and thought content normal.   Patient is pleasant and talkative although  and son need to provide most of the information due to patient's poor memory.   Nursing note and vitals reviewed.              Assessment/Plan:     1. COPD exacerbation (CMS-HCC)  Vital signs are normal with good pulse ox and she is afebrile. I ordered a chest x-ray and will start her on a Medrol Dosepak and doxycycline. I advise going to the ER if symptoms worsen despite treatment.  - MethylPREDNISolone (MEDROL DOSEPAK) 4 MG Tablet Therapy Pack; Per package insert.  Dispense: 21 Tab; Refill: 1  - doxycycline (VIBRAMYCIN) 100 MG Tab; Take 1 Tab by mouth 2 times a day for 10 days.  Dispense: 20 Tab; Refill: 0  - DX-CHEST-2 VIEWS; Future    2. Chronic obstructive pulmonary disease, unspecified COPD type (CMS-HCC)  I will attempt to get a new nebulizer for patient because she uses this for her COPD and her current device is not working well. She will continue to use her albuterol every 4-6 hours as needed.  - Misc. Devices Misc; Nebulizer to use with albuterol QID PRN.  Dispense: 1 Each; Refill: 11    3. Dizziness  I explained that I do not have all the answers regarding her recent testing and reviewed the  telephone messages. I advised them to contact her neurologist for their questions regarding the EEG results and what medicine she may start on. They apparently missed their appointment to discuss all this with neurology recently.

## 2018-01-02 NOTE — TELEPHONE ENCOUNTER
Tom Guerra M.D.  Iliana Lainez, Med Ass't   Caller: Unspecified (1 week ago)             Brain MRI-- not remarkable   EEG mild abnormal     We could try low dose seizure medication for dizziness if the patient would like to try     ________________________________________________________________________       This scalp EEG is consistent with mild nonspecific cortical dysfunction ( slow posterior background rhythm)          Spoke to daughter in law gave above information. They  would like to try medication. She had 1 question she stated patient plays video games on her phone for hours every day. She would like to know if this could contribute to the dizziness or any other findings.    Please advise    Thank you

## 2018-01-03 ENCOUNTER — OFFICE VISIT (OUTPATIENT)
Dept: NEUROLOGY | Facility: MEDICAL CENTER | Age: 78
End: 2018-01-03
Payer: MEDICARE

## 2018-01-03 ENCOUNTER — HOSPITAL ENCOUNTER (OUTPATIENT)
Dept: RADIOLOGY | Facility: MEDICAL CENTER | Age: 78
End: 2018-01-03
Attending: NURSE PRACTITIONER
Payer: MEDICARE

## 2018-01-03 VITALS
WEIGHT: 181.5 LBS | TEMPERATURE: 98.1 F | HEART RATE: 79 BPM | OXYGEN SATURATION: 90 % | SYSTOLIC BLOOD PRESSURE: 152 MMHG | BODY MASS INDEX: 30.99 KG/M2 | DIASTOLIC BLOOD PRESSURE: 72 MMHG | RESPIRATION RATE: 18 BRPM | HEIGHT: 64 IN

## 2018-01-03 DIAGNOSIS — J44.1 COPD EXACERBATION (HCC): ICD-10-CM

## 2018-01-03 DIAGNOSIS — R26.81 UNSTEADY GAIT: ICD-10-CM

## 2018-01-03 PROCEDURE — 99214 OFFICE O/P EST MOD 30 MIN: CPT | Performed by: PSYCHIATRY & NEUROLOGY

## 2018-01-03 PROCEDURE — 71046 X-RAY EXAM CHEST 2 VIEWS: CPT

## 2018-01-03 RX ORDER — ZONISAMIDE 50 MG/1
50 CAPSULE ORAL DAILY
Qty: 30 CAP | Refills: 3 | Status: SHIPPED | OUTPATIENT
Start: 2018-01-03 | End: 2018-12-18

## 2018-01-03 NOTE — PROGRESS NOTES
NEUROLOGY NOTE    Referring Physician  Orestes Warren      CHIEF COMPLAINT:  Memory problems for years-- just moved to [place] from [facility]  She was told to have vit B12 deficiency  She also has daily headache      Chief Complaint   Patient presents with   • Follow-Up     Test results       PRESENT ILLNESS:     Used to live in [place]--- 2017, moved back to [place]    Memory problems for years-- just moved to [place] from [facility]  She was told to have vit B12 deficiency  She also has daily headache    She also had COPD        RED FLAGS for reversible dementia  Headache V  Fluctuation course V  Tremor X  Rapid Progressive onset ( within 2 years) V  MRI hippocampus not atrophy ?      PAST MEDICAL HISTORY:  No past medical history on file.    PAST SURGICAL HISTORY:  No past surgical history on file.    FAMILY HISTORY:  Family History   Problem Relation Age of Onset   • Stroke Mother    • No Known Problems Father    • Heart Disease Brother        SOCIAL HISTORY:  Social History     Social History   • Marital status:      Spouse name: N/A   • Number of children: N/A   • Years of education: N/A     Occupational History   • Not on file.     Social History Main Topics   • Smoking status: Never Smoker   • Smokeless tobacco: Never Used   • Alcohol use No   • Drug use: No   • Sexual activity: Yes     Partners: Male     Other Topics Concern   • Not on file     Social History Narrative   • No narrative on file     ALLERGIES:  No Known Allergies  TOBHX  History   Smoking Status   • Never Smoker   Smokeless Tobacco   • Never Used     ALCHX  History   Alcohol Use No     DRUGHX  History   Drug Use No           MEDICATIONS:  Current Outpatient Prescriptions   Medication   • zonisamide (ZONEGRAN) 50 MG capsule   • MethylPREDNISolone (MEDROL DOSEPAK) 4 MG Tablet Therapy Pack   • doxycycline (VIBRAMYCIN) 100 MG Tab   • Misc. Devices Misc   • SYMBICORT 160-4.5 MCG/ACT Aerosol   • memantine (NAMENDA) 5 MG Tab   •  "alendronate (FOSAMAX) 70 MG Tab   • acetaminophen (TYLENOL) 500 MG Tab   • amlodipine (NORVASC) 10 MG Tab   • folic acid (FOLVITE) 1 MG Tab   • levothyroxine (SYNTHROID) 125 MCG Tab   • pravastatin (PRAVACHOL) 40 MG tablet   • metoprolol SR (TOPROL XL) 100 MG TABLET SR 24 HR   • citalopram (CELEXA) 20 MG Tab   • enalapril (VASOTEC) 20 MG tablet   • meclizine (ANTIVERT) 25 MG Tab   • albuterol (PROVENTIL) 2.5mg/3ml Nebu Soln solution for nebulization   • PROAIR  (90 BASE) MCG/ACT Aero Soln inhalation aerosol   • ipratropium (ATROVENT) 0.02 % Solution     No current facility-administered medications for this visit.        REVIEW OF SYSTEM:    Constitutional: Denies fevers, Denies weight changes   Eyes: Denies changes in vision, no eye pain   Ears/Nose/Throat/Mouth: Denies nasal congestion or sore throat   Cardiovascular: Denies chest pain or palpitations   Respiratory: COPD  Gastrointestinal/Hepatic: Denies abdominal pain, nausea, vomiting, diarrhea, constipation or GI bleeding   Genitourinary: Denies bladder dysfunction, dysuria or frequency   Musculoskeletal/Rheum: Denies joint pain and swelling   Skin/Breast: Denies rash, denies breast lumps or discharge   Neurological: Headache, DiZziness( dizziness spells made her sleepy)  Psychiatric: denies mood disorder   Endocrine: denies hx of diabetes or thyroid dysfunction   Heme/Oncology/Lymph Nodes: Denies enlarged lymph nodes, denies brusing or known bleeding disorder   Allergic/Immunologic: Denies hx of allergies         PHYSICAL AND NEUROLOGICAL EXMAINATIONS:  VITAL SIGNS: /72   Pulse 79   Temp 36.7 °C (98.1 °F)   Resp 18   Ht 1.626 m (5' 4\")   Wt 82.3 kg (181 lb 8 oz)   SpO2 90%   BMI 31.15 kg/m²   CURRENT WEIGHT:  BMI: Body mass index is 31.15 kg/m².  PREVIOUS WEIGHTS:  Wt Readings from Last 25 Encounters:   01/03/18 82.3 kg (181 lb 8 oz)   01/02/18 80.7 kg (178 lb)   08/18/17 82.1 kg (180 lb 14.4 oz)   06/30/17 83.1 kg (183 lb 3.2 oz)   05/25/17 " 83 kg (183 lb)   04/21/17 82.9 kg (182 lb 12.8 oz)   04/03/17 81.6 kg (180 lb)   02/22/17 85.7 kg (189 lb)   02/08/17 85.3 kg (188 lb)   01/25/17 86.6 kg (191 lb)       General appearance of patient: WDWN(+) NAD(+)    EYES  o Fundus : Papilledem(-) Exudates(-) Hemorrhage(-)  Nervous System  Orientation to time, place and person(+)  Memory normal(-)  Language: aphasia(-)  Knowledge: past(+) Current(+)  Attention(+)  Cranial Nerves  • Nerve 2: intact  • Nerve 3,4,6: intact  • Nerve 5 : intact  • Nerve 7: intact  • Nerve 8: intact  • Nerve 9 & 10: intact  • Nerve 11: intact  • Nerve 12: intact  Muscle Power and muscle tone: symmetric, normal in upper and lower  Sensory System: Pin sensation intact(+)  Reflexes: symmetric throughout  Cerebellar Function FNP normal   Gait : Steady(+) TandemGait steady(+)  Heart and Vascular  Peripheral Vasucular system : Edema (-) Swelling(-)  RHB, Breathing sound clear  abdomen bowel sound normoactive  Extremities freely moveable  Joints no contracture         NEUROIMAGING: I reviewed the MRI/CT of brain     Denied falling since last visit    Headache remains  Dizziness remains an issue    Recall 2/3 Registration 3/3     IMPRESSION:    1. Memory problem for years-- her former neurologist was in [facility]  2. Hx of Vit B12 deficiency ( diagnosed in [place]), daily Headache, Dizziness  3. Hx of hypothyroidism, COPD, HTN. Depression, Hearing impairment    PLAN/RECOMMENDATIONS:    Namenda 5mg made her sleepy-- the patient did not take Namenda faithfully  Advise 2 meals per day-- the patient only took one meal per day      Dizziness spells worsened since last visit  ________________________________________________________________________      Since the EEG was not normal, we will add   Keppra 125mg two times per day to control the dizziness  If any side effects, please call us  If not helpful, please let us know    We may also offer spinal tap in the  future  ________________________________________________________________________        Advise the following  ________________________________________________________________________    Fish Oil -- Omega 3 1000mg  3# daily  Vit D-3 4000 unit daily  ________________________________________________________________________      ________________________________________________________________________    Advise exercise muscle and brain  Avoid processed foods-- focus on natural foods  Avoid sugar      ________________________________________________________________________        ________________________________________________________________________      Seizures, Early Alzheimer's May Go Hand in Hand  Seizure disorders of all etiologies increase in the second half of life, beginning around the age of 50. The incidence ratio of epilepsy in individuals diagnosed with AD is elevated nearly 87 fold at this age. Seizure incidence at ages beyond 70 remains elevated three-fold over patients of a similar age without AD. Modified from Ector et al (2006), and Sameer et al (2007)    A Perfect Storm: Converging Paths of Epilepsy and Alzheimer's Dementia Intersect in the Hippocampal Formation  Manolo Mckay MD, PhD  http://www.ncbi.nlm.nih.gov/pmc/articles/OKK4479507/  Http://www.medpagetoday.[facility]/theUN[facility] Hospitalguide/neurology/82637    ________________________________________________________________________          ________________________________________________________________________    Foods that inflame    Try to avoid or limit these foods as much as possible:     refined carbohydrates, such as white bread and pastries     French fries and other fried foods     soda and other sugar-sweetened beverages     red meat (burgers, steaks) and processed meat (hot dogs, sausage)     margarine, shortening, and lard    Foods that combat inflammation     Include plenty of these anti-inflammatory foods in your  diet:     tomatoes     olive oil     green leafy vegetables, such as spinach, kale, and collards     nuts like almonds and walnuts     fatty fish like salmon, mackerel, tuna, and sardines     fruits such as strawberries, blueberries, cherries, and oranges        ________________________________________________________________________    PLAN      Results for MATTHIEU GONZALEZ (MRN [id number]) as of 8/18/2017 09:06   Ref. Range 4/21/2017 10:10   25-Hydroxy   Vitamin D 25 Latest Ref Range:  ng/mL 26 (L)   Microsomal -Tpo- Abs Latest Ref Range: 0.0-9.0 IU/mL 511.2 (H)   Anti-Thyroglobulin Latest Ref Range: 0.0-4.0 IU/mL <0.9   Antinuclear Antibody Latest Ref Range: None Detected  None Detected       SIGNATURE:  Tom Guerra      CC:  Orestes Warren M.D.          ________________________________________________________________________    This scalp EEG is consistent with mild nonspecific cortical dysfunction ( slow posterior background rhythm)    This nonspecific abnormalities could be secondary to degeneration, medical disease, polypharmacy and etc    There are no epileptiform discharges in this record    Of note, unremarkable EEG does not completely exclude the diagnosis  of seizures since seizure is an episodic phenomena.  Clinical correlation may help     If clinical suspicion of seizure remains high.  Prolonged outpatient EEG   monitoring may be of help.    ________________________________________________________________________

## 2018-01-03 NOTE — PATIENT INSTRUCTIONS
IMPRESSION:    1. Memory problem for years-- her former neurologist was in [facility]  2. Hx of Vit B12 deficiency ( diagnosed in [place]), daily Headache, Dizziness  3. Hx of hypothyroidism, COPD, HTN. Depression, Hearing impairment    PLAN/RECOMMENDATIONS:    Namenda 5mg made her sleepy-- the patient did not take Namenda faithfully    Dizziness spells worsened since last visit  ________________________________________________________________________      Since the EEG was not normal, we will add   Keppra 125mg two times per day to control the dizziness  If any side effects, please call us  If not helpful, please let us know    We may also offer spinal tap in the future  ________________________________________________________________________        Advise the following  ________________________________________________________________________    Fish Oil -- Omega 3 1000mg  3# daily  Vit D-3 4000 unit daily  ________________________________________________________________________      ________________________________________________________________________    Advise exercise muscle and brain  Avoid processed foods-- focus on natural foods  Avoid sugar      ________________________________________________________________________        ________________________________________________________________________      Seizures, Early Alzheimer's May Go Hand in Hand  Seizure disorders of all etiologies increase in the second half of life, beginning around the age of 50. The incidence ratio of epilepsy in individuals diagnosed with AD is elevated nearly 87 fold at this age. Seizure incidence at ages beyond 70 remains elevated three-fold over patients of a similar age without AD. Modified from Ector et al (2006), and Sameer et al (2007)    A Perfect Storm: Converging Paths of Epilepsy and Alzheimer's Dementia Intersect in the Hippocampal Formation  Manolo Mckay MD,  PhD  http://www.ncbi.nlm.nih.gov/pmc/articles/LHR1369670/  Http://www.medpagetoday.com/jose manuel/neurology/02602    ________________________________________________________________________          ________________________________________________________________________    Foods that inflame    Try to avoid or limit these foods as much as possible:     refined carbohydrates, such as white bread and pastries     French fries and other fried foods     soda and other sugar-sweetened beverages     red meat (burgers, steaks) and processed meat (hot dogs, sausage)     margarine, shortening, and lard    Foods that combat inflammation     Include plenty of these anti-inflammatory foods in your diet:     tomatoes     olive oil     green leafy vegetables, such as spinach, kale, and collards     nuts like almonds and walnuts     fatty fish like salmon, mackerel, tuna, and sardines     fruits such as strawberries, blueberries, cherries, and oranges        ________________________________________________________________________    PLAN      Results for MATTHIEU GONZALEZ (MRN [id number]) as of 8/18/2017 09:06   Ref. Range 4/21/2017 10:10   25-Hydroxy   Vitamin D 25 Latest Ref Range:  ng/mL 26 (L)   Microsomal -Tpo- Abs Latest Ref Range: 0.0-9.0 IU/mL 511.2 (H)   Anti-Thyroglobulin Latest Ref Range: 0.0-4.0 IU/mL <0.9   Antinuclear Antibody Latest Ref Range: None Detected  None Detected       SIGNATURE:  Tom Guerra      CC:  Orestes Warren M.D.          ________________________________________________________________________    This scalp EEG is consistent with mild nonspecific cortical dysfunction ( slow posterior background rhythm)    This nonspecific abnormalities could be secondary to degeneration, medical disease, polypharmacy and etc    There are no epileptiform discharges in this record    Of note, unremarkable EEG does not completely exclude the diagnosis  of seizures since seizure is an episodic phenomena.   Clinical correlation may help     If clinical suspicion of seizure remains high.  Prolonged outpatient EEG   monitoring may be of help.    ________________________________________________________________________

## 2018-01-09 ENCOUNTER — TELEPHONE (OUTPATIENT)
Dept: NEUROLOGY | Facility: MEDICAL CENTER | Age: 78
End: 2018-01-09

## 2018-01-09 RX ORDER — LEVETIRACETAM 250 MG/1
125 TABLET ORAL 2 TIMES DAILY
Qty: 30 TAB | Refills: 3 | Status: SHIPPED | OUTPATIENT
Start: 2018-01-09 | End: 2018-04-30 | Stop reason: SDUPTHER

## 2018-01-09 NOTE — TELEPHONE ENCOUNTER
Daughter-in law called  went and picked up script and it was for Zonisamide and not Keppra. Patient instructions says it was suppose to be for Keppra 125 mg bid.     Please advise    Thankyou

## 2018-01-10 NOTE — TELEPHONE ENCOUNTER
Tom Guerra M.D.  Iliana Lainez, Med Ass't   Caller: Unspecified (Yesterday,  1:33 PM)             My fault   New prescription was sent to the pharmacy     For dementia patients   Usually, I will start with low dose   Keppra 125mg bid   If not helpful,   I would try zonegran 50mg daily     At times, if the patient has anger issue, I will try low dose zonegran first      Spoke to Danisha and gave above information.

## 2018-01-15 ENCOUNTER — TELEPHONE (OUTPATIENT)
Dept: NEUROLOGY | Facility: MEDICAL CENTER | Age: 78
End: 2018-01-15

## 2018-01-15 RX ORDER — CITALOPRAM 20 MG/1
TABLET ORAL
Qty: 90 TAB | Refills: 3 | Status: SHIPPED | OUTPATIENT
Start: 2018-01-15 | End: 2019-01-01 | Stop reason: SDUPTHER

## 2018-01-15 NOTE — TELEPHONE ENCOUNTER
Daughter-in law called patient picked up script for Celxa . She wants to know if it's safe to take with Keppra? Also she had a episode of anger today. Yohana stated You told her to call if anything new came up. She hasn't had one of these episodes since she lived in [place].    Please advise    Thank you

## 2018-01-15 NOTE — TELEPHONE ENCOUNTER
Tom Guerra M.D.  Iliana Lainez, Med Ass't   Caller: Unspecified (Today, 12:16 PM)             celexa is a good medicine, fine to go together with keppra     keppra in 1%-5% may make people more difficult in anger   Celexa might help   Keep us posted     If keppra does not work well   We could try lian      Spoke to Erin gave above information. She will call with any concerns.

## 2018-02-14 ENCOUNTER — OFFICE VISIT (OUTPATIENT)
Dept: MEDICAL GROUP | Facility: MEDICAL CENTER | Age: 78
End: 2018-02-14
Payer: MEDICARE

## 2018-02-14 ENCOUNTER — HOSPITAL ENCOUNTER (OUTPATIENT)
Dept: LAB | Facility: MEDICAL CENTER | Age: 78
End: 2018-02-14
Attending: INTERNAL MEDICINE
Payer: MEDICARE

## 2018-02-14 VITALS
BODY MASS INDEX: 30.11 KG/M2 | RESPIRATION RATE: 16 BRPM | HEIGHT: 64 IN | DIASTOLIC BLOOD PRESSURE: 66 MMHG | TEMPERATURE: 99.4 F | OXYGEN SATURATION: 95 % | SYSTOLIC BLOOD PRESSURE: 136 MMHG | WEIGHT: 176.4 LBS | HEART RATE: 63 BPM

## 2018-02-14 DIAGNOSIS — Z91.81 RISK FOR FALLS: ICD-10-CM

## 2018-02-14 DIAGNOSIS — E03.9 ACQUIRED HYPOTHYROIDISM: ICD-10-CM

## 2018-02-14 DIAGNOSIS — E78.5 DYSLIPIDEMIA: ICD-10-CM

## 2018-02-14 DIAGNOSIS — G30.1 LATE ONSET ALZHEIMER'S DISEASE WITHOUT BEHAVIORAL DISTURBANCE (HCC): ICD-10-CM

## 2018-02-14 DIAGNOSIS — I10 ESSENTIAL HYPERTENSION: ICD-10-CM

## 2018-02-14 DIAGNOSIS — R56.9 SEIZURES (HCC): ICD-10-CM

## 2018-02-14 DIAGNOSIS — F02.80 LATE ONSET ALZHEIMER'S DISEASE WITHOUT BEHAVIORAL DISTURBANCE (HCC): ICD-10-CM

## 2018-02-14 DIAGNOSIS — M81.0 OSTEOPOROSIS WITHOUT CURRENT PATHOLOGICAL FRACTURE, UNSPECIFIED OSTEOPOROSIS TYPE: ICD-10-CM

## 2018-02-14 DIAGNOSIS — Z00.00 MEDICARE ANNUAL WELLNESS VISIT, SUBSEQUENT: ICD-10-CM

## 2018-02-14 DIAGNOSIS — J44.9 CHRONIC OBSTRUCTIVE PULMONARY DISEASE, UNSPECIFIED COPD TYPE (HCC): ICD-10-CM

## 2018-02-14 DIAGNOSIS — R73.02 IGT (IMPAIRED GLUCOSE TOLERANCE): ICD-10-CM

## 2018-02-14 DIAGNOSIS — F33.9 EPISODE OF RECURRENT MAJOR DEPRESSIVE DISORDER, UNSPECIFIED DEPRESSION EPISODE SEVERITY (HCC): ICD-10-CM

## 2018-02-14 LAB
ALBUMIN SERPL BCP-MCNC: 3.8 G/DL (ref 3.2–4.9)
ALBUMIN/GLOB SERPL: 1.2 G/DL
ALP SERPL-CCNC: 78 U/L (ref 30–99)
ALT SERPL-CCNC: 15 U/L (ref 2–50)
ANION GAP SERPL CALC-SCNC: 8 MMOL/L (ref 0–11.9)
AST SERPL-CCNC: 20 U/L (ref 12–45)
BILIRUB SERPL-MCNC: 0.7 MG/DL (ref 0.1–1.5)
BUN SERPL-MCNC: 19 MG/DL (ref 8–22)
CALCIUM SERPL-MCNC: 9.2 MG/DL (ref 8.5–10.5)
CHLORIDE SERPL-SCNC: 105 MMOL/L (ref 96–112)
CHOLEST SERPL-MCNC: 148 MG/DL (ref 100–199)
CO2 SERPL-SCNC: 30 MMOL/L (ref 20–33)
CREAT SERPL-MCNC: 0.77 MG/DL (ref 0.5–1.4)
EST. AVERAGE GLUCOSE BLD GHB EST-MCNC: 111 MG/DL
GLOBULIN SER CALC-MCNC: 3.1 G/DL (ref 1.9–3.5)
GLUCOSE SERPL-MCNC: 104 MG/DL (ref 65–99)
HBA1C MFR BLD: 5.5 % (ref 0–5.6)
HDLC SERPL-MCNC: 61 MG/DL
LDLC SERPL CALC-MCNC: 72 MG/DL
POTASSIUM SERPL-SCNC: 4.1 MMOL/L (ref 3.6–5.5)
PROT SERPL-MCNC: 6.9 G/DL (ref 6–8.2)
SODIUM SERPL-SCNC: 143 MMOL/L (ref 135–145)
TRIGL SERPL-MCNC: 74 MG/DL (ref 0–149)
TSH SERPL DL<=0.005 MIU/L-ACNC: 5.9 UIU/ML (ref 0.38–5.33)

## 2018-02-14 PROCEDURE — 80061 LIPID PANEL: CPT

## 2018-02-14 PROCEDURE — 36415 COLL VENOUS BLD VENIPUNCTURE: CPT

## 2018-02-14 PROCEDURE — 80053 COMPREHEN METABOLIC PANEL: CPT

## 2018-02-14 PROCEDURE — G0439 PPPS, SUBSEQ VISIT: HCPCS | Mod: 25 | Performed by: INTERNAL MEDICINE

## 2018-02-14 PROCEDURE — 84443 ASSAY THYROID STIM HORMONE: CPT

## 2018-02-14 PROCEDURE — 83036 HEMOGLOBIN GLYCOSYLATED A1C: CPT

## 2018-02-14 PROCEDURE — 99213 OFFICE O/P EST LOW 20 MIN: CPT | Mod: 25 | Performed by: INTERNAL MEDICINE

## 2018-02-14 ASSESSMENT — PATIENT HEALTH QUESTIONNAIRE - PHQ9
SUM OF ALL RESPONSES TO PHQ QUESTIONS 1-9: 8
5. POOR APPETITE OR OVEREATING: 0 - NOT AT ALL
CLINICAL INTERPRETATION OF PHQ2 SCORE: 3

## 2018-02-14 ASSESSMENT — ACTIVITIES OF DAILY LIVING (ADL): BATHING_REQUIRES_ASSISTANCE: 0

## 2018-02-14 NOTE — PROGRESS NOTES
CC: Follow-up blood pressure, thyroid and blood sugars.    HPI:   [place] presents today with the following.    1. Medicare annual wellness visit, subsequent  Screenings performed below. Information given on advanced directives.    2. IGT (impaired glucose tolerance)  Elevated blood sugars in the past she was given lab work to complete has not had done as of yet. She denying any excessive thirst or urination.    3. Essential hypertension  Blood pressure currently well controlled on current medication denying any chest pain or shortness of breath no edema.    4. Acquired hypothyroidism  Reports compliant to medications overdue for thyroid check. No hair or skin changes.        Information for advanced given to patient or instructed to bring advanced directives into to office to put in chart.      Depression Screening    Little interest or pleasure in doing things?  1 - several days  Feeling down, depressed , or hopeless? 2 - more than half the days  Trouble falling or staying asleep, or sleeping too much?  0 - not at all  Feeling tired or having little energy?  3 - nearly every day  Poor appetite or overeating?  0 - not at all  Feeling bad about yourself - or that you are a failure or have let yourself or your family down? 0 - not at all  Trouble concentrating on things, such as reading the newspaper or watching television? 2 - more than half the days  Moving or speaking so slowly that other people could have noticed.  Or the opposite - being so fidgety or restless that you have been moving around a lot more than usual?  0 - not at all  Thoughts that you would be better off dead, or of hurting yourself?  0 - not at all  Patient Health Questionnaire Score: 8    If depressive symptoms identified deferred to follow up visit unless specifically addressed in assessment and plan.    Interpretation of PHQ-9 Total Score   Score Severity   1-4 No Depression   5-9 Mild Depression   10-14 Moderate Depression   15-19 Moderately  Severe Depression   20-27 Severe Depression      Screening for Cognitive Impairment    Three Minute Recall (apple, watch, fannie)  1/3    Draw clock face with all 12 numbers set to the hand to show 10 minutes past 11 o'clock  1 4/5  Cognitive concerns identified deferred for follow up unless specifically addressed in assessment and plan.    Fall Risk Assessment    Has the patient had two or more falls in the last year or any fall with injury in the last year?  Yes    Safety Assessment    Throw rugs on floor.  Yes  Handrails on all stairs.  Yes  Good lighting in all hallways.  Yes  Difficulty hearing.  Yes  Patient counseled about all safety risks that were identified.    Functional Assessment ADLs    Are there any barriers preventing you from cooking for yourself or meeting nutritional needs?  No.    Are there any barriers preventing you from driving safely or obtaining transportation?  No.    Are there any barriers preventing you from using a telephone or calling for help?  No.    Are there any barriers preventing you from shopping?  No.    Are there any barriers preventing you from taking care of your own finances?  No.    Are there any barriers preventing you from managing your medications?  Yes.    Are there any barriers preventing you from showering/bathing yourself?  No.    Are currently engaging any exercise or physical activity?  Yes.       Health Maintenance Summary                PFT SCREENING-FEV1 AND FEV/FVC RATIO / SPIROMETRY SHOULD BE PERFORMED ANNUALLY Overdue 5/21/1958     IMM INFLUENZA Overdue 9/1/2017      Done 9/25/2016 Imm Admin: Influenza Vaccine Adult HD     Patient has more history with this topic...    Annual Wellness Visit Overdue 2/9/2018      Done 2/8/2017 Visit Dx: Medicare annual wellness visit, subsequent    IMM DTaP/Tdap/Td Vaccine Postponed 2/9/2028 Originally 5/21/1959. Insurance/Financial    BONE DENSITY Next Due 6/22/2019      Done 6/22/2017 DS-BONE DENSITY STUDY (DEXA)           Patient Care Team:  Orestes Warren M.D. as PCP - General (Internal Medicine)      Health Care Screening: Age-appropriate preventive services Medicare covers discussed today and ordered if indicated.    Patient Active Problem List    Diagnosis Date Noted   • Risk for falls 02/14/2018   • Osteoporosis without current pathological fracture 06/30/2017   • Vitamin B12 deficiency 04/21/2017   • IGT (impaired glucose tolerance) 04/03/2017   • Alzheimer's dementia without behavioral disturbance 02/08/2017   • Essential hypertension 01/25/2017   • Chronic left shoulder pain 01/25/2017   • Acquired hypothyroidism 01/25/2017   • Dyslipidemia 01/25/2017   • Episode of recurrent major depressive disorder (CMS-HCC) 01/25/2017   • Chronic obstructive pulmonary disease (CMS-HCC) 01/25/2017   • Non morbid obesity due to excess calories 01/25/2017       Current Outpatient Prescriptions   Medication Sig Dispense Refill   • PROAIR  (90 Base) MCG/ACT Aero Soln inhalation aerosol INHALE TWO PUFFS BY MOUTH EVERY 6 HOURS AS NEEDED FOR SHORTNESS OF BREATH 9 Inhaler 3   • metoprolol SR (TOPROL XL) 100 MG TABLET SR 24 HR TAKE ONE TABLET BY MOUTH ONCE DAILY 90 Tab 3   • pravastatin (PRAVACHOL) 40 MG tablet TAKE ONE TABLET BY MOUTH ONCE DAILY 90 Tab 3   • citalopram (CELEXA) 20 MG Tab TAKE ONE TABLET BY MOUTH ONCE DAILY 90 Tab 3   • levetiracetam (KEPPRA) 250 MG tablet Take 0.5 Tabs by mouth 2 times a day. 30 Tab 3   • zonisamide (ZONEGRAN) 50 MG capsule Take 1 Cap by mouth every day. 30 Cap 3   • Misc. Devices Misc Nebulizer to use with albuterol QID PRN. 1 Each 11   • SYMBICORT 160-4.5 MCG/ACT Aerosol INHALE ONE PUFF BY MOUTH TWICE DAILY 6 g 6   • memantine (NAMENDA) 5 MG Tab TAKE ONE TABLET BY MOUTH TWICE DAILY 180 Tab 1   • alendronate (FOSAMAX) 70 MG Tab Take 1 Tab by mouth every 7 days. 4 Tab 11   • acetaminophen (TYLENOL) 500 MG Tab Take 1-2 Tabs by mouth every 6 hours as needed. 30 Tab 0   • amlodipine (NORVASC) 10 MG Tab  "Take 1 Tab by mouth every day. 90 Tab 3   • folic acid (FOLVITE) 1 MG Tab Take 1 Tab by mouth every day. 90 Tab 3   • levothyroxine (SYNTHROID) 125 MCG Tab Take 1 Tab by mouth Every morning on an empty stomach. 90 Tab 3   • enalapril (VASOTEC) 20 MG tablet Take 1 Tab by mouth 2 times a day. 180 Tab 3   • meclizine (ANTIVERT) 25 MG Tab Take 1 Tab by mouth 3 times a day as needed. 90 Tab 0   • albuterol (PROVENTIL) 2.5mg/3ml Nebu Soln solution for nebulization 3 mL by Nebulization route every four hours as needed for Shortness of Breath. 300 mL 6   • ipratropium (ATROVENT) 0.02 % Solution 3 mL by Nebulization route 2 times a day. 100 Vial 6     No current facility-administered medications for this visit.        Family History   Problem Relation Age of Onset   • Stroke Mother    • No Known Problems Father    • Heart Disease Brother        Social History     Social History   • Marital status:      Spouse name: N/A   • Number of children: N/A   • Years of education: N/A     Occupational History   • Not on file.     Social History Main Topics   • Smoking status: Never Smoker   • Smokeless tobacco: Never Used   • Alcohol use No   • Drug use: No   • Sexual activity: Yes     Partners: Male     Other Topics Concern   • Not on file     Social History Narrative   • No narrative on file       History reviewed. No pertinent surgical history.    Allergies as of 02/14/2018   • (No Known Allergies)        ROS: Denies Chest pain, SOB, LE edema.    /66   Pulse 63   Temp 37.4 °C (99.4 °F)   Resp 16   Ht 1.626 m (5' 4\")   Wt 80 kg (176 lb 6.4 oz)   SpO2 95%   BMI 30.28 kg/m²      Physical Exam:  Gen:         Alert and oriented, No apparent distress.  Neck:        No Lymphadenopathy or Bruits.  Lungs:     Clear to auscultation bilaterally  CV:          Regular rate and rhythm. No murmurs, rubs or gallops.  Abd:         Soft non tender, non distended. Normal active bowel sounds.  No  Hepatosplenomegaly, No pulsatile " masses.                   Ext:          No clubbing, cyanosis, edema.      Assessment and Plan.   77 y.o. female with the following issues.    1. Medicare annual wellness visit, subsequent  Discussed healthy lifestyle habits as well as screening regimens.  - Annual Wellness Visit - Includes PPPS Subsequent ()    2. IGT (impaired glucose tolerance)  Discussion about diet rechecking blood work.  - HEMOGLOBIN A1C; Future    3. Essential hypertension  Currently well controlled, Discuss diet, exercise and salt restriction.  No change to medication therapy.     4. Acquired hypothyroidism  Continue current medication recheck thyroid  - TSH; Future    5. Dyslipidemia  Adding cholesterol to blood work  - Annual Wellness Visit - Includes PPPS Subsequent ()  - COMP METABOLIC PANEL; Future  - LIPID PROFILE; Future    6. Risk for falls  Precautions given  - Annual Wellness Visit - Includes PPPS Subsequent ()    7. Episode of recurrent major depressive disorder, unspecified depression episode severity (CMS-HCC)  Currently doing well no changes to medication  - Annual Wellness Visit - Includes PPPS Subsequent ()    8. BMI 30.0-30.9,adult  Discussion about weight loss.  - Patient identified as having weight management issue.  Appropriate orders and counseling given.  - Annual Wellness Visit - Includes PPPS Subsequent ()  - Patient identified as fall risk.  Appropriate orders and counseling given.    9. Chronic obstructive pulmonary disease, unspecified COPD type (CMS-HCC)  Compliant with inhalers breathing currently stable.  - Annual Wellness Visit - Includes PPPS Subsequent ()    10. Late onset Alzheimer's disease without behavioral disturbance  Following with neurology.  - Annual Wellness Visit - Includes PPPS Subsequent ()    11. Osteoporosis without current pathological fracture, unspecified osteoporosis type  Maintain on medication without side effect  - Annual Wellness Visit - Includes PPPS  Subsequent ()    12. Seizures (CMS-[facility])  Abnormal EEG was placed on Keppra followed by neurology due for follow-up.  - Annual Wellness Visit - Includes PPPS Subsequent ()      Referrals offered: Community-based lifestyle interventions to reduce health risks and promote self-management and wellness, fall prevention, nutrition, physical activity, tobacco-use cessation, weight loss, and mental health services as per orders if indicated.    Discussion today about general wellness and lifestyle habits:    · Prevent falls and reduce trip hazards; Cautioned about securing or removing rugs.  · Have a working fire alarm and carbon monoxide detector;   · Engage in regular physical activity and social activities

## 2018-02-14 NOTE — LETTER
February 14, 2018        [place] Timohamida          Current Outpatient Prescriptions   Medication Sig Dispense Refill   • PROAIR  (90 Base) MCG/ACT Aero Soln inhalation aerosol INHALE TWO PUFFS BY MOUTH EVERY 6 HOURS AS NEEDED FOR SHORTNESS OF BREATH 9 Inhaler 3   • metoprolol SR (TOPROL XL) 100 MG TABLET SR 24 HR TAKE ONE TABLET BY MOUTH ONCE DAILY 90 Tab 3   • pravastatin (PRAVACHOL) 40 MG tablet TAKE ONE TABLET BY MOUTH ONCE DAILY 90 Tab 3   • citalopram (CELEXA) 20 MG Tab TAKE ONE TABLET BY MOUTH ONCE DAILY 90 Tab 3   • levetiracetam (KEPPRA) 250 MG tablet Take 0.5 Tabs by mouth 2 times a day. 30 Tab 3   • zonisamide (ZONEGRAN) 50 MG capsule Take 1 Cap by mouth every day. 30 Cap 3   • Misc. Devices Misc Nebulizer to use with albuterol QID PRN. 1 Each 11   • SYMBICORT 160-4.5 MCG/ACT Aerosol INHALE ONE PUFF BY MOUTH TWICE DAILY 6 g 6   • memantine (NAMENDA) 5 MG Tab TAKE ONE TABLET BY MOUTH TWICE DAILY 180 Tab 1   • alendronate (FOSAMAX) 70 MG Tab Take 1 Tab by mouth every 7 days. 4 Tab 11   • acetaminophen (TYLENOL) 500 MG Tab Take 1-2 Tabs by mouth every 6 hours as needed. 30 Tab 0   • amlodipine (NORVASC) 10 MG Tab Take 1 Tab by mouth every day. 90 Tab 3   • folic acid (FOLVITE) 1 MG Tab Take 1 Tab by mouth every day. 90 Tab 3   • levothyroxine (SYNTHROID) 125 MCG Tab Take 1 Tab by mouth Every morning on an empty stomach. 90 Tab 3   • enalapril (VASOTEC) 20 MG tablet Take 1 Tab by mouth 2 times a day. 180 Tab 3   • meclizine (ANTIVERT) 25 MG Tab Take 1 Tab by mouth 3 times a day as needed. 90 Tab 0   • albuterol (PROVENTIL) 2.5mg/3ml Nebu Soln solution for nebulization 3 mL by Nebulization route every four hours as needed for Shortness of Breath. 300 mL 6   • ipratropium (ATROVENT) 0.02 % Solution 3 mL by Nebulization route 2 times a day. 100 Vial 6     No current facility-administered medications for this visit.                          Orestes Warren

## 2018-02-18 DIAGNOSIS — F03.90 DEMENTIA WITHOUT BEHAVIORAL DISTURBANCE, UNSPECIFIED DEMENTIA TYPE: ICD-10-CM

## 2018-02-20 RX ORDER — MEMANTINE HYDROCHLORIDE 5 MG/1
TABLET ORAL
Qty: 180 TAB | Refills: 1 | Status: SHIPPED | OUTPATIENT
Start: 2018-02-20 | End: 2018-05-03 | Stop reason: SDUPTHER

## 2018-03-01 ENCOUNTER — TELEPHONE (OUTPATIENT)
Dept: MEDICAL GROUP | Facility: MEDICAL CENTER | Age: 78
End: 2018-03-01

## 2018-03-01 NOTE — LETTER
March 1, 2018        [place] is still able to make informed decisions as pertains to choosing a power of .                       Orestes Warren MD

## 2018-03-01 NOTE — TELEPHONE ENCOUNTER
1. Caller Name: Daughter                                         Call Back Number: [phone]      Patient approves a detailed voicemail message: N\A    Daughter called and they will going to an  to draw up a power of . The  is requesting a letter from her PCP that she is able to make an informed decision on her own so she can make decisions pertaining to her power of .

## 2018-03-07 ENCOUNTER — TELEPHONE (OUTPATIENT)
Dept: NEUROLOGY | Facility: MEDICAL CENTER | Age: 78
End: 2018-03-07

## 2018-03-08 ENCOUNTER — TELEPHONE (OUTPATIENT)
Dept: NEUROLOGY | Facility: MEDICAL CENTER | Age: 78
End: 2018-03-08

## 2018-03-08 NOTE — TELEPHONE ENCOUNTER
Patient's son called today stating patient is having angry, lashing episodes that seem to be getting worse. He is not sure if it's related to her medications.    Please advise     Thank you

## 2018-03-13 NOTE — TELEPHONE ENCOUNTER
Tom Guerra M.D.  Iliana Lainez, Med Ass't   Caller: Unspecified (5 days ago, 12:59 PM)             Stop KEPPRA then     ( Keppra is for dizziness)      Spoke to son gave above information. Advised if things get worse or if any new concerns to call me to get appointment. He verbalized under standing and will call.

## 2018-04-20 ENCOUNTER — OFFICE VISIT (OUTPATIENT)
Dept: MEDICAL GROUP | Facility: MEDICAL CENTER | Age: 78
End: 2018-04-20
Payer: MEDICARE

## 2018-04-20 VITALS
OXYGEN SATURATION: 98 % | HEIGHT: 64 IN | SYSTOLIC BLOOD PRESSURE: 134 MMHG | BODY MASS INDEX: 30.22 KG/M2 | DIASTOLIC BLOOD PRESSURE: 80 MMHG | WEIGHT: 177 LBS | HEART RATE: 55 BPM | RESPIRATION RATE: 16 BRPM | TEMPERATURE: 99.1 F

## 2018-04-20 DIAGNOSIS — L98.9 SKIN LESION: ICD-10-CM

## 2018-04-20 DIAGNOSIS — R21 RASH: ICD-10-CM

## 2018-04-20 DIAGNOSIS — L57.0 AK (ACTINIC KERATOSIS): ICD-10-CM

## 2018-04-20 PROCEDURE — 99213 OFFICE O/P EST LOW 20 MIN: CPT | Mod: 25 | Performed by: INTERNAL MEDICINE

## 2018-04-20 PROCEDURE — 17003 DESTRUCT PREMALG LES 2-14: CPT | Performed by: INTERNAL MEDICINE

## 2018-04-20 PROCEDURE — 17000 DESTRUCT PREMALG LESION: CPT | Performed by: INTERNAL MEDICINE

## 2018-04-20 RX ORDER — TRIAMCINOLONE ACETONIDE 1 MG/G
1 CREAM TOPICAL 2 TIMES DAILY
Qty: 1 TUBE | Refills: 3 | Status: SHIPPED | OUTPATIENT
Start: 2018-04-20 | End: 2018-12-18

## 2018-04-20 NOTE — PROGRESS NOTES
CC: Rash and skin lesions.    HPI:   [place] presents today with the following.    1. Rash  Presents complaining of a rash affecting her upper chest and back for the last week. She reports it, down currently. She will does report it itches. Denies any exacerbating factors. She has been using creams again is improved today.    2. Skin lesion  While looking Enterobacter was one lesion on the left upper back that has some slight concern. It was not noticed to her.     3. AK (actinic keratosis)  Looking at her arms multiple actinic keratosis as well.      Patient Active Problem List    Diagnosis Date Noted   • Risk for falls 02/14/2018   • Osteoporosis without current pathological fracture 06/30/2017   • Vitamin B12 deficiency 04/21/2017   • IGT (impaired glucose tolerance) 04/03/2017   • Alzheimer's dementia without behavioral disturbance 02/08/2017   • Essential hypertension 01/25/2017   • Chronic left shoulder pain 01/25/2017   • Acquired hypothyroidism 01/25/2017   • Dyslipidemia 01/25/2017   • Episode of recurrent major depressive disorder (CMS-HCC) 01/25/2017   • Chronic obstructive pulmonary disease (CMS-HCC) 01/25/2017   • Non morbid obesity due to excess calories 01/25/2017       Current Outpatient Prescriptions   Medication Sig Dispense Refill   • triamcinolone acetonide (KENALOG) 0.1 % Cream Apply 1 Application to affected area(s) 2 times a day. 1 Tube 3   • amLODIPine (NORVASC) 10 MG Tab TAKE ONE TABLET BY MOUTH ONCE DAILY 90 Tab 3   • levothyroxine (SYNTHROID) 125 MCG Tab TAKE ONE TABLET BY MOUTH ONCE DAILY IN THE MORNING ON  AN  EMPTY  STOMACH 90 Tab 3   • memantine (NAMENDA) 5 MG Tab TAKE ONE TABLET BY MOUTH TWICE DAILY 180 Tab 1   • folic acid (FOLVITE) 1 MG Tab TAKE ONE TABLET BY MOUTH ONCE DAILY 90 Tab 3   • ipratropium (ATROVENT) 0.02 % Solution INHALE ONE VIAL IN NEBULIZER TWICE DAILY 300 Vial 6   • albuterol (PROVENTIL) 2.5mg/3ml Nebu Soln solution for nebulization USE ONE VIAL IN NEBULIZER EVERY 4  "HOURS AS NEEDED FOR SHORTNESS OF BREATH 300 Bullet 6   • PROAIR  (90 Base) MCG/ACT Aero Soln inhalation aerosol INHALE TWO PUFFS BY MOUTH EVERY 6 HOURS AS NEEDED FOR SHORTNESS OF BREATH 9 Inhaler 3   • metoprolol SR (TOPROL XL) 100 MG TABLET SR 24 HR TAKE ONE TABLET BY MOUTH ONCE DAILY 90 Tab 3   • pravastatin (PRAVACHOL) 40 MG tablet TAKE ONE TABLET BY MOUTH ONCE DAILY 90 Tab 3   • citalopram (CELEXA) 20 MG Tab TAKE ONE TABLET BY MOUTH ONCE DAILY 90 Tab 3   • levetiracetam (KEPPRA) 250 MG tablet Take 0.5 Tabs by mouth 2 times a day. 30 Tab 3   • zonisamide (ZONEGRAN) 50 MG capsule Take 1 Cap by mouth every day. 30 Cap 3   • Misc. Devices Misc Nebulizer to use with albuterol QID PRN. 1 Each 11   • SYMBICORT 160-4.5 MCG/ACT Aerosol INHALE ONE PUFF BY MOUTH TWICE DAILY 6 g 6   • alendronate (FOSAMAX) 70 MG Tab Take 1 Tab by mouth every 7 days. 4 Tab 11   • acetaminophen (TYLENOL) 500 MG Tab Take 1-2 Tabs by mouth every 6 hours as needed. 30 Tab 0   • enalapril (VASOTEC) 20 MG tablet Take 1 Tab by mouth 2 times a day. 180 Tab 3   • meclizine (ANTIVERT) 25 MG Tab Take 1 Tab by mouth 3 times a day as needed. 90 Tab 0     No current facility-administered medications for this visit.          Allergies as of 04/20/2018   • (No Known Allergies)        ROS: Denies Chest pain, SOB, LE edema.    /80   Pulse (!) 55   Temp 37.3 °C (99.1 °F)   Resp 16   Ht 1.626 m (5' 4\")   Wt 80.3 kg (177 lb)   SpO2 98%   BMI 30.38 kg/m²      Physical Exam:  Gen:         Alert and oriented, No apparent distress.  Neck:        No Lymphadenopathy or Bruits.  Lungs:     Clear to auscultation bilaterally  CV:          Regular rate and rhythm. No murmurs, rubs or gallops.               Ext:          No clubbing, cyanosis, edema.      Assessment and Plan.   77 y.o. female with the following issues.    1. Rash  Looks to be a seasonal dermatosis versus contact have recommended going to products in the home. Placing on steroid cream if " not improving referral to dermatology.  - REFERRAL TO DERMATOLOGY    2. Skin lesion  1 cm well-rounded purplish lesion on the left upper back somewhat concerning refer to dermatology for evaluation.  - REFERRAL TO DERMATOLOGY    3. AK (actinic keratosis)  3 lesions frozen off the right arm today.

## 2018-05-01 RX ORDER — LEVETIRACETAM 250 MG/1
TABLET ORAL
Qty: 90 TAB | Refills: 1 | Status: SHIPPED | OUTPATIENT
Start: 2018-05-01 | End: 2018-05-03 | Stop reason: SDUPTHER

## 2018-05-03 ENCOUNTER — OFFICE VISIT (OUTPATIENT)
Dept: NEUROLOGY | Facility: MEDICAL CENTER | Age: 78
End: 2018-05-03
Payer: MEDICARE

## 2018-05-03 VITALS
WEIGHT: 176.37 LBS | DIASTOLIC BLOOD PRESSURE: 78 MMHG | SYSTOLIC BLOOD PRESSURE: 130 MMHG | OXYGEN SATURATION: 95 % | BODY MASS INDEX: 30.11 KG/M2 | TEMPERATURE: 97.8 F | HEIGHT: 64 IN | HEART RATE: 76 BPM

## 2018-05-03 DIAGNOSIS — G30.1 LATE ONSET ALZHEIMER'S DISEASE WITHOUT BEHAVIORAL DISTURBANCE (HCC): ICD-10-CM

## 2018-05-03 DIAGNOSIS — E53.8 VITAMIN B12 DEFICIENCY: ICD-10-CM

## 2018-05-03 DIAGNOSIS — F02.80 LATE ONSET ALZHEIMER'S DISEASE WITHOUT BEHAVIORAL DISTURBANCE (HCC): ICD-10-CM

## 2018-05-03 DIAGNOSIS — F03.90 DEMENTIA WITHOUT BEHAVIORAL DISTURBANCE, UNSPECIFIED DEMENTIA TYPE: ICD-10-CM

## 2018-05-03 PROCEDURE — 99214 OFFICE O/P EST MOD 30 MIN: CPT | Performed by: PSYCHIATRY & NEUROLOGY

## 2018-05-03 RX ORDER — MEMANTINE HYDROCHLORIDE 5 MG/1
5 TABLET ORAL 2 TIMES DAILY
Qty: 180 TAB | Refills: 1 | Status: SHIPPED | OUTPATIENT
Start: 2018-05-03 | End: 2018-11-05 | Stop reason: SDUPTHER

## 2018-05-03 RX ORDER — LEVETIRACETAM 250 MG/1
250 TABLET ORAL 2 TIMES DAILY
Qty: 180 TAB | Refills: 1 | Status: SHIPPED | OUTPATIENT
Start: 2018-05-03 | End: 2018-10-29 | Stop reason: SDUPTHER

## 2018-05-03 NOTE — PROGRESS NOTES
NEUROLOGY NOTE    Referring Physician  Orestes Warren      CHIEF COMPLAINT:  Memory problems for years-- just moved to [place] from [facility]  She was told to have vit B12 deficiency  She also has daily headache      Chief Complaint   Patient presents with   • Follow-Up     unsteady gait and Alzheimer's disease       PRESENT ILLNESS:     Used to live in [place]--- 2017, moved back to [place]    Memory problems for years-- just moved to [place] from [facility]  She was told to have vit B12 deficiency  She also has daily headache    She also had COPD    The dizziness sounds mildly improve with low dose of keppra      RED FLAGS for reversible dementia  Headache V  Fluctuation course V  Tremor X  Rapid Progressive onset ( within 2 years) V  MRI hippocampus not atrophy ?      PAST MEDICAL HISTORY:  History reviewed. No pertinent past medical history.    PAST SURGICAL HISTORY:  History reviewed. No pertinent surgical history.    FAMILY HISTORY:  Family History   Problem Relation Age of Onset   • Stroke Mother    • No Known Problems Father    • Heart Disease Brother        SOCIAL HISTORY:  Social History     Social History   • Marital status:      Spouse name: N/A   • Number of children: N/A   • Years of education: N/A     Occupational History   • Not on file.     Social History Main Topics   • Smoking status: Never Smoker   • Smokeless tobacco: Never Used   • Alcohol use No   • Drug use: No   • Sexual activity: Yes     Partners: Male     Other Topics Concern   • Not on file     Social History Narrative   • No narrative on file     ALLERGIES:  No Known Allergies  TOBHX  History   Smoking Status   • Never Smoker   Smokeless Tobacco   • Never Used     ALCHX  History   Alcohol Use No     DRUGHX  History   Drug Use No           MEDICATIONS:  Current Outpatient Prescriptions   Medication   • levETIRAcetam (KEPPRA) 250 MG tablet   • triamcinolone acetonide (KENALOG) 0.1 % Cream   • amLODIPine (NORVASC) 10 MG Tab    "  • levothyroxine (SYNTHROID) 125 MCG Tab   • memantine (NAMENDA) 5 MG Tab   • folic acid (FOLVITE) 1 MG Tab   • ipratropium (ATROVENT) 0.02 % Solution   • albuterol (PROVENTIL) 2.5mg/3ml Nebu Soln solution for nebulization   • PROAIR  (90 Base) MCG/ACT Aero Soln inhalation aerosol   • metoprolol SR (TOPROL XL) 100 MG TABLET SR 24 HR   • pravastatin (PRAVACHOL) 40 MG tablet   • citalopram (CELEXA) 20 MG Tab   • zonisamide (ZONEGRAN) 50 MG capsule   • Misc. Devices Misc   • SYMBICORT 160-4.5 MCG/ACT Aerosol   • alendronate (FOSAMAX) 70 MG Tab   • acetaminophen (TYLENOL) 500 MG Tab   • enalapril (VASOTEC) 20 MG tablet   • meclizine (ANTIVERT) 25 MG Tab     No current facility-administered medications for this visit.        REVIEW OF SYSTEM:    Constitutional: Denies fevers, Denies weight changes   Eyes: Denies changes in vision, no eye pain   Ears/Nose/Throat/Mouth: Denies nasal congestion or sore throat   Cardiovascular: Denies chest pain or palpitations   Respiratory: COPD  Gastrointestinal/Hepatic: Denies abdominal pain, nausea, vomiting, diarrhea, constipation or GI bleeding   Genitourinary: Denies bladder dysfunction, dysuria or frequency   Musculoskeletal/Rheum: Denies joint pain and swelling   Skin/Breast: Denies rash, denies breast lumps or discharge   Neurological: Headache, DiZziness( dizziness spells made her sleepy)  Psychiatric: denies mood disorder   Endocrine: denies hx of diabetes or thyroid dysfunction   Heme/Oncology/Lymph Nodes: Denies enlarged lymph nodes, denies brusing or known bleeding disorder   Allergic/Immunologic: Denies hx of allergies         PHYSICAL AND NEUROLOGICAL EXMAINATIONS:  VITAL SIGNS: /78   Pulse 76   Temp 36.6 °C (97.8 °F)   Ht 1.626 m (5' 4\")   Wt 80 kg (176 lb 5.9 oz)   SpO2 95%   BMI 30.27 kg/m²   CURRENT WEIGHT:  BMI: Body mass index is 30.27 kg/m².  PREVIOUS WEIGHTS:  Wt Readings from Last 25 Encounters:   05/03/18 80 kg (176 lb 5.9 oz)   04/20/18 80.3 " kg (177 lb)   02/14/18 80 kg (176 lb 6.4 oz)   01/03/18 82.3 kg (181 lb 8 oz)   01/02/18 80.7 kg (178 lb)   08/18/17 82.1 kg (180 lb 14.4 oz)   06/30/17 83.1 kg (183 lb 3.2 oz)   05/25/17 83 kg (183 lb)   04/21/17 82.9 kg (182 lb 12.8 oz)   04/03/17 81.6 kg (180 lb)   02/22/17 85.7 kg (189 lb)   02/08/17 85.3 kg (188 lb)   01/25/17 86.6 kg (191 lb)       General appearance of patient: WDWN(+) NAD(+)    EYES  o Fundus : Papilledem(-) Exudates(-) Hemorrhage(-)  Nervous System  Orientation to time, place and person(+)  Memory normal(-)  Language: aphasia(-)  Knowledge: past(+) Current(+)  Attention(+)  Cranial Nerves  • Nerve 2: intact  • Nerve 3,4,6: intact  • Nerve 5 : intact  • Nerve 7: intact  • Nerve 8: intact  • Nerve 9 & 10: intact  • Nerve 11: intact  • Nerve 12: intact  Muscle Power and muscle tone: symmetric, normal in upper and lower  Sensory System: Pin sensation intact(+)  Reflexes: symmetric throughout  Cerebellar Function FNP normal   Gait : Steady(+) TandemGait steady(+)  Heart and Vascular  Peripheral Vasucular system : Edema (-) Swelling(-)  RHB, Breathing sound clear  abdomen bowel sound normoactive  Extremities freely moveable  Joints no contracture         NEUROIMAGING: I reviewed the MRI/CT of brain     Denied falling since last visit    Headache remains  Dizziness remains an issue    Recall 2/3 Registration 3/3     IMPRESSION:    1. Memory problem for years-- her former neurologist was in [facility]  2. Hx of Vit B12 deficiency ( diagnosed in [place]), daily Headache, Dizziness  3. Hx of hypothyroidism, COPD, HTN. Depression, Hearing impairment    PLAN/RECOMMENDATIONS:    Continue Namenda 5mg made her sleepy-- the patient did not take Namenda faithfully  ________________________________________________________________________    The dizziness sounds improving somewhat  Up the Keppra 250mg two times per day to control the dizziness  If any side effects, please call us  If not helpful, please  let us know    We may also offer spinal tap in the future  ________________________________________________________________________        Advise the following  ________________________________________________________________________    Fish Oil -- Omega 3 1000mg  3# daily  Vit D-3 4000 unit daily  ________________________________________________________________________      ________________________________________________________________________    Advise exercise muscle and brain  Avoid processed foods-- focus on natural foods  Avoid sugar      ________________________________________________________________________        ________________________________________________________________________      Seizures, Early Alzheimer's May Go Hand in Hand  Seizure disorders of all etiologies increase in the second half of life, beginning around the age of 50. The incidence ratio of epilepsy in individuals diagnosed with AD is elevated nearly 87 fold at this age. Seizure incidence at ages beyond 70 remains elevated three-fold over patients of a similar age without AD. Modified from Ector et al (2006), and Sameer et al (2007)    A Perfect Storm: Converging Paths of Epilepsy and Alzheimer's Dementia Intersect in the Hippocampal Formation  Manolo Mckay MD, PhD  http://www.ncbi.nlm.nih.gov/pmc/articles/VCA6411472/  Http://www.medpagetoday.ThriveOn/theguptaguide/neurology/12497    ________________________________________________________________________          Results for MATTHIEU GONZALEZ (MRN [id number]) as of 8/18/2017 09:06   Ref. Range 4/21/2017 10:10   25-Hydroxy   Vitamin D 25 Latest Ref Range:  ng/mL 26 (L)   Microsomal -Tpo- Abs Latest Ref Range: 0.0-9.0 IU/mL 511.2 (H)   Anti-Thyroglobulin Latest Ref Range: 0.0-4.0 IU/mL <0.9   Antinuclear Antibody Latest Ref Range: None Detected  None Detected       SIGNATURE:  Tom Guerra      CC:  Orestes Warren,  M.D.          ________________________________________________________________________    This scalp EEG is consistent with mild nonspecific cortical dysfunction ( slow posterior background rhythm)    This nonspecific abnormalities could be secondary to degeneration, medical disease, polypharmacy and etc    There are no epileptiform discharges in this record    Of note, unremarkable EEG does not completely exclude the diagnosis  of seizures since seizure is an episodic phenomena.  Clinical correlation may help     If clinical suspicion of seizure remains high.  Prolonged outpatient EEG   monitoring may be of help.    ________________________________________________________________________

## 2018-05-03 NOTE — PATIENT INSTRUCTIONS
IMPRESSION:    1. Memory problem for years-- her former neurologist was in [place]  2. Hx of Vit B12 deficiency ( diagnosed in [place]), daily Headache, Dizziness  3. Hx of hypothyroidism, COPD, HTN. Depression, Hearing impairment    PLAN/RECOMMENDATIONS:    Continue Namenda 5mg made her sleepy-- the patient did not take Namenda faithfully  ________________________________________________________________________    The dizziness sounds improving somewhat  Up the Keppra 250mg two times per day to control the dizziness  If any side effects, please call us  If not helpful, please let us know    We may also offer spinal tap in the future  ________________________________________________________________________        Advise the following  ________________________________________________________________________    Fish Oil -- Omega 3 1000mg  3# daily  Vit D-3 4000 unit daily  ________________________________________________________________________      ________________________________________________________________________    Advise exercise muscle and brain  Avoid processed foods-- focus on natural foods  Avoid sugar      ________________________________________________________________________

## 2018-05-15 DIAGNOSIS — M81.0 OSTEOPOROSIS WITHOUT CURRENT PATHOLOGICAL FRACTURE, UNSPECIFIED OSTEOPOROSIS TYPE: ICD-10-CM

## 2018-05-15 DIAGNOSIS — E66.09 NON MORBID OBESITY DUE TO EXCESS CALORIES: ICD-10-CM

## 2018-05-15 RX ORDER — ALENDRONATE SODIUM 70 MG/1
TABLET ORAL
Qty: 4 TAB | Refills: 11 | Status: ON HOLD | OUTPATIENT
Start: 2018-05-15 | End: 2018-12-19

## 2018-06-26 ENCOUNTER — APPOINTMENT (RX ONLY)
Dept: URBAN - METROPOLITAN AREA CLINIC 4 | Facility: CLINIC | Age: 78
Setting detail: DERMATOLOGY
End: 2018-06-26

## 2018-06-26 DIAGNOSIS — L82.1 OTHER SEBORRHEIC KERATOSIS: ICD-10-CM

## 2018-06-26 DIAGNOSIS — D22 MELANOCYTIC NEVI: ICD-10-CM

## 2018-06-26 DIAGNOSIS — L81.4 OTHER MELANIN HYPERPIGMENTATION: ICD-10-CM

## 2018-06-26 DIAGNOSIS — Z71.89 OTHER SPECIFIED COUNSELING: ICD-10-CM

## 2018-06-26 DIAGNOSIS — L57.0 ACTINIC KERATOSIS: ICD-10-CM

## 2018-06-26 DIAGNOSIS — D18.0 HEMANGIOMA: ICD-10-CM

## 2018-06-26 PROBLEM — J44.9 CHRONIC OBSTRUCTIVE PULMONARY DISEASE, UNSPECIFIED: Status: ACTIVE | Noted: 2018-06-26

## 2018-06-26 PROBLEM — D18.01 HEMANGIOMA OF SKIN AND SUBCUTANEOUS TISSUE: Status: ACTIVE | Noted: 2018-06-26

## 2018-06-26 PROBLEM — D22.5 MELANOCYTIC NEVI OF TRUNK: Status: ACTIVE | Noted: 2018-06-26

## 2018-06-26 PROBLEM — D48.5 NEOPLASM OF UNCERTAIN BEHAVIOR OF SKIN: Status: ACTIVE | Noted: 2018-06-26

## 2018-06-26 PROBLEM — I10 ESSENTIAL (PRIMARY) HYPERTENSION: Status: ACTIVE | Noted: 2018-06-26

## 2018-06-26 PROBLEM — J45.909 UNSPECIFIED ASTHMA, UNCOMPLICATED: Status: ACTIVE | Noted: 2018-06-26

## 2018-06-26 PROBLEM — E03.9 HYPOTHYROIDISM, UNSPECIFIED: Status: ACTIVE | Noted: 2018-06-26

## 2018-06-26 PROCEDURE — ? COUNSELING

## 2018-06-26 PROCEDURE — ? LIQUID NITROGEN

## 2018-06-26 PROCEDURE — 11101: CPT

## 2018-06-26 PROCEDURE — 17004 DESTROY PREMAL LESIONS 15/>: CPT

## 2018-06-26 PROCEDURE — 99202 OFFICE O/P NEW SF 15 MIN: CPT | Mod: 25

## 2018-06-26 PROCEDURE — ? BIOPSY BY SHAVE METHOD

## 2018-06-26 PROCEDURE — 11100: CPT | Mod: 59

## 2018-06-26 ASSESSMENT — LOCATION DETAILED DESCRIPTION DERM
LOCATION DETAILED: LEFT PROXIMAL DORSAL FOREARM
LOCATION DETAILED: LEFT MEDIAL SUPERIOR CHEST
LOCATION DETAILED: LEFT RADIAL DORSAL HAND
LOCATION DETAILED: LEFT INFERIOR LATERAL FOREHEAD
LOCATION DETAILED: RIGHT MID TEMPLE
LOCATION DETAILED: RIGHT RADIAL DORSAL HAND
LOCATION DETAILED: NASAL DORSUM
LOCATION DETAILED: RIGHT FOREHEAD
LOCATION DETAILED: RIGHT CENTRAL MALAR CHEEK
LOCATION DETAILED: RIGHT ANTERIOR PROXIMAL UPPER ARM
LOCATION DETAILED: RIGHT SUPERIOR UPPER BACK
LOCATION DETAILED: INFERIOR THORACIC SPINE
LOCATION DETAILED: LEFT ANTERIOR DISTAL UPPER ARM
LOCATION DETAILED: LEFT DISTAL DORSAL FOREARM
LOCATION DETAILED: RIGHT ULNAR DORSAL HAND
LOCATION DETAILED: RIGHT SUPERIOR MEDIAL BUCCAL CHEEK
LOCATION DETAILED: RIGHT PROXIMAL DORSAL FOREARM
LOCATION DETAILED: LEFT INFERIOR FOREHEAD
LOCATION DETAILED: EPIGASTRIC SKIN
LOCATION DETAILED: LEFT CENTRAL EYEBROW
LOCATION DETAILED: LEFT SUPERIOR FOREHEAD
LOCATION DETAILED: LEFT ULNAR DORSAL HAND

## 2018-06-26 ASSESSMENT — LOCATION SIMPLE DESCRIPTION DERM
LOCATION SIMPLE: RIGHT HAND
LOCATION SIMPLE: RIGHT UPPER ARM
LOCATION SIMPLE: LEFT UPPER ARM
LOCATION SIMPLE: RIGHT TEMPLE
LOCATION SIMPLE: CHEST
LOCATION SIMPLE: RIGHT FOREHEAD
LOCATION SIMPLE: RIGHT CHEEK
LOCATION SIMPLE: LEFT FOREHEAD
LOCATION SIMPLE: RIGHT UPPER BACK
LOCATION SIMPLE: LEFT HAND
LOCATION SIMPLE: LEFT FOREARM
LOCATION SIMPLE: RIGHT FOREARM
LOCATION SIMPLE: NOSE
LOCATION SIMPLE: LEFT EYEBROW
LOCATION SIMPLE: ABDOMEN
LOCATION SIMPLE: UPPER BACK

## 2018-06-26 ASSESSMENT — LOCATION ZONE DERM
LOCATION ZONE: NOSE
LOCATION ZONE: HAND
LOCATION ZONE: ARM
LOCATION ZONE: TRUNK
LOCATION ZONE: FACE

## 2018-06-26 NOTE — PROCEDURE: BIOPSY BY SHAVE METHOD
Hemostasis: Drysol
Billing Type: Third-Party Bill
Consent: Written consent was obtained and risks were reviewed including but not limited to scarring, infection, bleeding, scabbing, incomplete removal, nerve damage and allergy to anesthesia.
Silver Nitrate Text: The wound bed was treated with silver nitrate after the biopsy was performed.
Cryotherapy Text: The wound bed was treated with cryotherapy after the biopsy was performed.
Anesthesia Volume In Cc: 1
Depth Of Biopsy: dermis
Was A Bandage Applied: Yes
Bill 48025 For Specimen Handling/Conveyance To Laboratory?: no
Curettage Text: The wound bed was treated with curettage after the biopsy was performed.
Type Of Destruction Used: Curettage
Notification Instructions: Patient will be notified of biopsy results. However, patient instructed to call the office if not contacted within 2 weeks.
Lab Facility: 
Anesthesia Type: 1% lidocaine with epinephrine
Wound Care: Vaseline
Biopsy Method: Personna blade
Detail Level: Detailed
Electrodesiccation And Curettage Text: The wound bed was treated with electrodesiccation and curettage after the biopsy was performed.
Lab: [id number]
Biopsy Type: H and E
Dressing: bandage
Additional Anesthesia Volume In Cc (Will Not Render If 0): 0
Electrodesiccation Text: The wound bed was treated with electrodesiccation after the biopsy was performed.
Post-Care Instructions: I reviewed with the patient in detail post-care instructions. Patient is to keep the biopsy site dry overnight, and then apply bacitracin twice daily until healed. Patient may apply hydrogen peroxide soaks to remove any crusting.

## 2018-06-26 NOTE — PROCEDURE: LIQUID NITROGEN
Consent: The patient's consent was obtained including but not limited to risks of crusting, scabbing, blistering, scarring, darker or lighter pigmentary change, recurrence, incomplete removal and infection.
Number Of Freeze-Thaw Cycles: 2 freeze-thaw cycles
Duration Of Freeze Thaw-Cycle (Seconds): 3
Detail Level: Detailed
Render Post-Care Instructions In Note?: no
Post-Care Instructions: I reviewed with the patient in detail post-care instructions. Patient is to wear sunprotection, and avoid picking at any of the treated lesions. Pt may apply Vaseline to crusted or scabbing areas.

## 2018-08-14 ENCOUNTER — OFFICE VISIT (OUTPATIENT)
Dept: MEDICAL GROUP | Facility: MEDICAL CENTER | Age: 78
End: 2018-08-14
Payer: MEDICARE

## 2018-08-14 VITALS
RESPIRATION RATE: 16 BRPM | DIASTOLIC BLOOD PRESSURE: 68 MMHG | BODY MASS INDEX: 32.44 KG/M2 | OXYGEN SATURATION: 92 % | SYSTOLIC BLOOD PRESSURE: 126 MMHG | TEMPERATURE: 99 F | HEART RATE: 65 BPM | WEIGHT: 190 LBS | HEIGHT: 64 IN

## 2018-08-14 DIAGNOSIS — N39.3 STRESS INCONTINENCE OF URINE: ICD-10-CM

## 2018-08-14 DIAGNOSIS — L57.0 AK (ACTINIC KERATOSIS): ICD-10-CM

## 2018-08-14 DIAGNOSIS — R73.02 IGT (IMPAIRED GLUCOSE TOLERANCE): ICD-10-CM

## 2018-08-14 DIAGNOSIS — E66.09 NON MORBID OBESITY DUE TO EXCESS CALORIES: ICD-10-CM

## 2018-08-14 DIAGNOSIS — E78.5 DYSLIPIDEMIA: ICD-10-CM

## 2018-08-14 DIAGNOSIS — I10 ESSENTIAL HYPERTENSION: ICD-10-CM

## 2018-08-14 DIAGNOSIS — E03.9 ACQUIRED HYPOTHYROIDISM: ICD-10-CM

## 2018-08-14 PROCEDURE — 99214 OFFICE O/P EST MOD 30 MIN: CPT | Mod: 25 | Performed by: INTERNAL MEDICINE

## 2018-08-14 PROCEDURE — 17000 DESTRUCT PREMALG LESION: CPT | Performed by: INTERNAL MEDICINE

## 2018-08-14 PROCEDURE — 17003 DESTRUCT PREMALG LES 2-14: CPT | Performed by: INTERNAL MEDICINE

## 2018-08-14 NOTE — PROGRESS NOTES
CC: Follow-up blood sugars, hypertension complaining of incontinence issues.    HPI:   [place] presents today with the following.    1. Stress incontinence of urine   brings up today that she has issues with coughing and sneezing with incontinence.  She denies any frequent symptoms of sudden urgency.  She has no pain with urination symptoms of present for some time and she does wear pads.    2. IGT (impaired glucose tolerance)  Blood sugars elevated in the past never truly been diabetic she will be coming due for blood work in the near future.    3. Essential hypertension  Well-controlled on current medications denying any dizziness.    4. Dyslipidemia  Cholesterol controlled on statin denying myalgias.    5. AK (actinic keratosis)  Multiple actinic keratosis frozen in the past she has some persistent lesions on the right arm that she would like looked at    6. Acquired hypothyroidism  Maintain on medication denying her skin changes.    7. Non morbid obesity due to excess calories  Persistently elevated has gone up since last visit she is not currently working with diet or exercise.      Patient Active Problem List    Diagnosis Date Noted   • Stress incontinence of urine 08/14/2018   • Risk for falls 02/14/2018   • Osteoporosis without current pathological fracture 06/30/2017   • Vitamin B12 deficiency 04/21/2017   • IGT (impaired glucose tolerance) 04/03/2017   • Alzheimer's dementia without behavioral disturbance 02/08/2017   • Essential hypertension 01/25/2017   • Chronic left shoulder pain 01/25/2017   • Acquired hypothyroidism 01/25/2017   • Dyslipidemia 01/25/2017   • Episode of recurrent major depressive disorder (HCC) 01/25/2017   • Chronic obstructive pulmonary disease (HCC) 01/25/2017   • Non morbid obesity due to excess calories 01/25/2017       Current Outpatient Prescriptions   Medication Sig Dispense Refill   • alendronate (FOSAMAX) 70 MG Tab TAKE ONE TABLET BY MOUTH ONCE A WEEK 4 Tab 11   •  levETIRAcetam (KEPPRA) 250 MG tablet Take 1 Tab by mouth 2 times a day. TWICE DAILY 180 Tab 1   • memantine (NAMENDA) 5 MG Tab Take 1 Tab by mouth 2 times a day. TWICE DAILY 180 Tab 1   • triamcinolone acetonide (KENALOG) 0.1 % Cream Apply 1 Application to affected area(s) 2 times a day. 1 Tube 3   • amLODIPine (NORVASC) 10 MG Tab TAKE ONE TABLET BY MOUTH ONCE DAILY 90 Tab 3   • levothyroxine (SYNTHROID) 125 MCG Tab TAKE ONE TABLET BY MOUTH ONCE DAILY IN THE MORNING ON  AN  EMPTY  STOMACH 90 Tab 3   • folic acid (FOLVITE) 1 MG Tab TAKE ONE TABLET BY MOUTH ONCE DAILY 90 Tab 3   • ipratropium (ATROVENT) 0.02 % Solution INHALE ONE VIAL IN NEBULIZER TWICE DAILY 300 Vial 6   • albuterol (PROVENTIL) 2.5mg/3ml Nebu Soln solution for nebulization USE ONE VIAL IN NEBULIZER EVERY 4 HOURS AS NEEDED FOR SHORTNESS OF BREATH 300 Bullet 6   • PROAIR  (90 Base) MCG/ACT Aero Soln inhalation aerosol INHALE TWO PUFFS BY MOUTH EVERY 6 HOURS AS NEEDED FOR SHORTNESS OF BREATH 9 Inhaler 3   • metoprolol SR (TOPROL XL) 100 MG TABLET SR 24 HR TAKE ONE TABLET BY MOUTH ONCE DAILY 90 Tab 3   • pravastatin (PRAVACHOL) 40 MG tablet TAKE ONE TABLET BY MOUTH ONCE DAILY 90 Tab 3   • citalopram (CELEXA) 20 MG Tab TAKE ONE TABLET BY MOUTH ONCE DAILY 90 Tab 3   • zonisamide (ZONEGRAN) 50 MG capsule Take 1 Cap by mouth every day. 30 Cap 3   • Misc. Devices Misc Nebulizer to use with albuterol QID PRN. 1 Each 11   • SYMBICORT 160-4.5 MCG/ACT Aerosol INHALE ONE PUFF BY MOUTH TWICE DAILY 6 g 6   • acetaminophen (TYLENOL) 500 MG Tab Take 1-2 Tabs by mouth every 6 hours as needed. 30 Tab 0   • enalapril (VASOTEC) 20 MG tablet Take 1 Tab by mouth 2 times a day. 180 Tab 3   • meclizine (ANTIVERT) 25 MG Tab Take 1 Tab by mouth 3 times a day as needed. 90 Tab 0     No current facility-administered medications for this visit.          Allergies as of 08/14/2018   • (No Known Allergies)        ROS: Denies Chest pain, SOB, LE edema.    /68   Pulse 65  "  Temp 37.2 °C (99 °F)   Resp 16   Ht 1.626 m (5' 4\")   Wt 86.2 kg (190 lb)   SpO2 92%   BMI 32.61 kg/m²     Physical Exam:  Gen:         Alert and oriented, No apparent distress.  Neck:        No Lymphadenopathy or Bruits.  Lungs:     Clear to auscultation bilaterally  CV:          Regular rate and rhythm. No murmurs, rubs or gallops.               Ext:          No clubbing, cyanosis, edema.      Assessment and Plan.   78 y.o. female with the following issues.    1. Stress incontinence of urine  Discussion about timed voiding as well as [place] exercise follow-up if not improving.    2. IGT (impaired glucose tolerance)  Recheck blood work  - HEMOGLOBIN A1C; Future    3. Essential hypertension  Currently well controlled, Discuss diet, exercise and salt restriction.  No change to medication therapy.    4. Dyslipidemia  Continue statin recheck cholesterol  - COMP METABOLIC PANEL; Future  - LIPID PROFILE; Future    5. AK (actinic keratosis)  3 lesions frozen off the right arm consistent with actinic keratosis post care instructions given    6. Acquired hypothyroidism  Recheck thyroid  - TSH; Future    7. Non morbid obesity due to excess calories  Discussion about weight loss strategies.  - Patient identified as having weight management issue.  Appropriate orders and counseling given.      "

## 2018-09-28 ENCOUNTER — TELEPHONE (OUTPATIENT)
Dept: MEDICAL GROUP | Facility: MEDICAL CENTER | Age: 78
End: 2018-09-28

## 2018-09-28 DIAGNOSIS — J44.9 CHRONIC OBSTRUCTIVE PULMONARY DISEASE, UNSPECIFIED COPD TYPE (HCC): ICD-10-CM

## 2018-10-30 RX ORDER — LEVETIRACETAM 250 MG/1
TABLET ORAL
Qty: 180 TAB | Refills: 1 | Status: SHIPPED | OUTPATIENT
Start: 2018-10-30 | End: 2018-11-05 | Stop reason: SDUPTHER

## 2018-11-05 ENCOUNTER — OFFICE VISIT (OUTPATIENT)
Dept: NEUROLOGY | Facility: MEDICAL CENTER | Age: 78
End: 2018-11-05
Payer: MEDICARE

## 2018-11-05 VITALS
SYSTOLIC BLOOD PRESSURE: 124 MMHG | HEIGHT: 64 IN | RESPIRATION RATE: 16 BRPM | WEIGHT: 180.56 LBS | TEMPERATURE: 98.1 F | BODY MASS INDEX: 30.83 KG/M2 | DIASTOLIC BLOOD PRESSURE: 76 MMHG | HEART RATE: 69 BPM | OXYGEN SATURATION: 94 %

## 2018-11-05 DIAGNOSIS — M31.6 TEMPORAL ARTERITIS (HCC): ICD-10-CM

## 2018-11-05 DIAGNOSIS — G30.1 LATE ONSET ALZHEIMER'S DISEASE WITHOUT BEHAVIORAL DISTURBANCE (HCC): ICD-10-CM

## 2018-11-05 DIAGNOSIS — F02.80 LATE ONSET ALZHEIMER'S DISEASE WITHOUT BEHAVIORAL DISTURBANCE (HCC): ICD-10-CM

## 2018-11-05 DIAGNOSIS — E53.8 VITAMIN B12 DEFICIENCY: ICD-10-CM

## 2018-11-05 DIAGNOSIS — F03.90 DEMENTIA WITHOUT BEHAVIORAL DISTURBANCE, UNSPECIFIED DEMENTIA TYPE: ICD-10-CM

## 2018-11-05 PROCEDURE — 99214 OFFICE O/P EST MOD 30 MIN: CPT | Performed by: PSYCHIATRY & NEUROLOGY

## 2018-11-05 RX ORDER — MEMANTINE HYDROCHLORIDE 5 MG/1
5 TABLET ORAL 2 TIMES DAILY
Qty: 180 TAB | Refills: 1 | Status: SHIPPED | OUTPATIENT
Start: 2018-11-05 | End: 2019-04-11 | Stop reason: SDUPTHER

## 2018-11-05 RX ORDER — LEVETIRACETAM 250 MG/1
375 TABLET ORAL 2 TIMES DAILY
Qty: 270 TAB | Refills: 1 | Status: ON HOLD | OUTPATIENT
Start: 2018-11-05 | End: 2018-12-19

## 2018-11-05 NOTE — PATIENT INSTRUCTIONS
Headache remains  Dizziness remains an issue    Recall 2/3 Registration 3/3 today    IMPRESSION:    1. Memory problem for years-- her former neurologist was in [facility]  2. Hx of Vit B12 deficiency ( diagnosed in [place]), daily Headache, Dizziness, Lack of energy  3. Hx of hypothyroidism, COPD, HTN. Depression, Hearing impairment    PLAN/RECOMMENDATIONS:    Continue Namenda 5mg made her sleepy-- the patient did not take Namenda faithfully  ________________________________________________________________________    The dizziness sounds improving somewhat  Up the Keppra 375 two times per day to control the dizziness and bad days ( around 2 times per week) 375mg = one and half of 250mg   If any side effects, please call us -- could go back to Keppra 250mg two times per day  If not helpful, please let us know      This time, we will offer left temporal artery biopsy-- left temporal artery  We may also offer spinal tap in the future if temporal artery biopsy is negative  ________________________________________________________________________    ________________________________________________________________________    REFERENCE--       ________________________________________________________________________
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
## 2018-11-20 ENCOUNTER — HOSPITAL ENCOUNTER (OUTPATIENT)
Dept: RADIOLOGY | Facility: MEDICAL CENTER | Age: 78
End: 2018-11-20
Attending: PHYSICIAN ASSISTANT
Payer: MEDICARE

## 2018-11-20 ENCOUNTER — OFFICE VISIT (OUTPATIENT)
Dept: URGENT CARE | Facility: PHYSICIAN GROUP | Age: 78
End: 2018-11-20
Payer: MEDICARE

## 2018-11-20 VITALS
HEIGHT: 64 IN | WEIGHT: 180 LBS | HEART RATE: 62 BPM | SYSTOLIC BLOOD PRESSURE: 142 MMHG | OXYGEN SATURATION: 96 % | TEMPERATURE: 98.2 F | DIASTOLIC BLOOD PRESSURE: 64 MMHG | RESPIRATION RATE: 16 BRPM | BODY MASS INDEX: 30.73 KG/M2

## 2018-11-20 DIAGNOSIS — M79.671 PAIN OF RIGHT HEEL: ICD-10-CM

## 2018-11-20 DIAGNOSIS — M79.604 RIGHT LEG PAIN: ICD-10-CM

## 2018-11-20 PROCEDURE — 73630 X-RAY EXAM OF FOOT: CPT | Mod: RT

## 2018-11-20 PROCEDURE — 93971 EXTREMITY STUDY: CPT | Mod: RT

## 2018-11-20 PROCEDURE — 99213 OFFICE O/P EST LOW 20 MIN: CPT | Performed by: PHYSICIAN ASSISTANT

## 2018-11-20 RX ORDER — METHYLPREDNISOLONE 4 MG/1
TABLET ORAL
Qty: 21 TAB | Refills: 0 | Status: ON HOLD | OUTPATIENT
Start: 2018-11-20 | End: 2018-12-19

## 2018-11-22 ASSESSMENT — ENCOUNTER SYMPTOMS
NEUROLOGICAL NEGATIVE: 1
CONSTITUTIONAL NEGATIVE: 1
CARDIOVASCULAR NEGATIVE: 1
RESPIRATORY NEGATIVE: 1

## 2018-11-22 NOTE — PROGRESS NOTES
Subjective:      [place] Ernst is a 78 y.o. female who presents with Foot Pain (bottom of R foot x 2 weeks, also has rash on leg)        HPI   Patient presents today for about 2 weeks of waxing and waning severity of right foot/ankle pain.  Patient notes pain with walking on it and per  this has been a fairly consistent complaint that has been worsening.  They have noticed some swelling of the foot/ankle area but also of the leg to some degree. She denies calf or leg pain.  She denies numbness or tingling.  There was no trauma or fall.  No hx of ankle injury or foot issues.  No hx of DVT.  She has taken some Tylenol with mild relief.     Review of Systems   Constitutional: Negative.    HENT: Negative.    Respiratory: Negative.    Cardiovascular: Negative.    Musculoskeletal:        SEE HPI, RIGHT FOOT/ANKLE   Neurological: Negative.    Endo/Heme/Allergies: Negative.        PMH:  has no past medical history on file.  MEDS:   Current Outpatient Prescriptions:   •  MethylPREDNISolone (MEDROL DOSEPAK) 4 MG Tablet Therapy Pack, Take as directed, Disp: 21 Tab, Rfl: 0  •  levETIRAcetam (KEPPRA) 250 MG tablet, Take 1.5 Tabs by mouth 2 times a day., Disp: 270 Tab, Rfl: 1  •  memantine (NAMENDA) 5 MG Tab, Take 1 Tab by mouth 2 times a day. TWICE DAILY, Disp: 180 Tab, Rfl: 1  •  NON SPECIFIED, neublizer tubing., Disp: 1 Each, Rfl: 0  •  alendronate (FOSAMAX) 70 MG Tab, TAKE ONE TABLET BY MOUTH ONCE A WEEK, Disp: 4 Tab, Rfl: 11  •  triamcinolone acetonide (KENALOG) 0.1 % Cream, Apply 1 Application to affected area(s) 2 times a day., Disp: 1 Tube, Rfl: 3  •  amLODIPine (NORVASC) 10 MG Tab, TAKE ONE TABLET BY MOUTH ONCE DAILY, Disp: 90 Tab, Rfl: 3  •  levothyroxine (SYNTHROID) 125 MCG Tab, TAKE ONE TABLET BY MOUTH ONCE DAILY IN THE MORNING ON  AN  EMPTY  STOMACH, Disp: 90 Tab, Rfl: 3  •  folic acid (FOLVITE) 1 MG Tab, TAKE ONE TABLET BY MOUTH ONCE DAILY, Disp: 90 Tab, Rfl: 3  •  ipratropium (ATROVENT) 0.02 % Solution,  "INHALE ONE VIAL IN NEBULIZER TWICE DAILY, Disp: 300 Vial, Rfl: 6  •  albuterol (PROVENTIL) 2.5mg/3ml Nebu Soln solution for nebulization, USE ONE VIAL IN NEBULIZER EVERY 4 HOURS AS NEEDED FOR SHORTNESS OF BREATH, Disp: 300 Bullet, Rfl: 6  •  PROAIR  (90 Base) MCG/ACT Aero Soln inhalation aerosol, INHALE TWO PUFFS BY MOUTH EVERY 6 HOURS AS NEEDED FOR SHORTNESS OF BREATH, Disp: 9 Inhaler, Rfl: 3  •  metoprolol SR (TOPROL XL) 100 MG TABLET SR 24 HR, TAKE ONE TABLET BY MOUTH ONCE DAILY, Disp: 90 Tab, Rfl: 3  •  pravastatin (PRAVACHOL) 40 MG tablet, TAKE ONE TABLET BY MOUTH ONCE DAILY, Disp: 90 Tab, Rfl: 3  •  citalopram (CELEXA) 20 MG Tab, TAKE ONE TABLET BY MOUTH ONCE DAILY, Disp: 90 Tab, Rfl: 3  •  zonisamide (ZONEGRAN) 50 MG capsule, Take 1 Cap by mouth every day., Disp: 30 Cap, Rfl: 3  •  Misc. Devices Misc, Nebulizer to use with albuterol QID PRN., Disp: 1 Each, Rfl: 11  •  SYMBICORT 160-4.5 MCG/ACT Aerosol, INHALE ONE PUFF BY MOUTH TWICE DAILY, Disp: 6 g, Rfl: 6  •  acetaminophen (TYLENOL) 500 MG Tab, Take 1-2 Tabs by mouth every 6 hours as needed., Disp: 30 Tab, Rfl: 0  •  enalapril (VASOTEC) 20 MG tablet, Take 1 Tab by mouth 2 times a day., Disp: 180 Tab, Rfl: 3  •  meclizine (ANTIVERT) 25 MG Tab, Take 1 Tab by mouth 3 times a day as needed., Disp: 90 Tab, Rfl: 0  ALLERGIES: No Known Allergies  SURGHX: History reviewed. No pertinent surgical history.  SOCHX:  reports that she has never smoked. She has never used smokeless tobacco. She reports that she does not drink alcohol or use drugs.  FH: Family history was reviewed, no pertinent findings to report   Objective:     /64   Pulse 62   Temp 36.8 °C (98.2 °F)   Resp 16   Ht 1.626 m (5' 4\")   Wt 81.6 kg (180 lb)   SpO2 96%   BMI 30.90 kg/m²      Physical Exam   Constitutional: She is oriented to person, place, and time. She appears well-developed and well-nourished. No distress.   HENT:   Head: Normocephalic and atraumatic.   Eyes: Conjunctivae " and EOM are normal.   Neck: Normal range of motion. Neck supple.   Cardiovascular: Normal rate and regular rhythm.    Pulmonary/Chest: Effort normal and breath sounds normal.   Musculoskeletal:        Right ankle: She exhibits normal range of motion and no ecchymosis. Achilles tendon normal.        Right lower leg: She exhibits swelling (mild calf swelling.  ). She exhibits no tenderness.        Feet:    Neurological: She is alert and oriented to person, place, and time.   Skin: Skin is warm and dry.   Psychiatric: She has a normal mood and affect. Her behavior is normal.   Vitals reviewed.       RAD      Order   US-EXTREMITY VENOUS LOWER UNILAT RIGHT [[id number]] (Order [id number])   Reviewed by Uzma Muse P.A.-C. on 11/20/2018  4:18 PM   Images     Show images for US-EXTREMITY VENOUS LOWER UNILAT RIGHT   View SmartLink Info     US-EXTREMITY VENOUS LOWER UNILAT RIGHT (Order #[id number]) on 11/20/18   Narrative       11/20/2018 3:21 PM    HISTORY/REASON FOR EXAM:  Swelling of Limb  Right leg pain and edema    TECHNIQUE/EXAM DESCRIPTION: Right lower extremity doppler venous ultrasound was performed.    COMPARISON:  None.    FINDINGS:  Using both color and pulse-wave Doppler imaging, multiple images were obtained from the common femoral vein origins distally through the popliteal trifurcations.  Graded compression was used to demonstrate patent lumens.  There is no evidence   of luminal thrombus in the extremity.  There is normal augmentation to flow and respiratory variation.   Impression       No evidence of right  lower extremity deep venous thrombosis.   Reading Provider Reading Date   Nadia Gill M.D. Nov 20, 2018       Order   DX-FOOT-COMPLETE 3+ RIGHT [[id number]] (Order [id number])   Reviewed by Uzma Muse P.A.-C. on 11/20/2018  4:18 PM   Images     Show images for DX-FOOT-COMPLETE 3+ RIGHT   View SmartLink Info     DX-FOOT-COMPLETE 3+ RIGHT (Order #[id number]) on 11/20/18    Narrative       11/20/2018 3:15 PM    HISTORY/REASON FOR EXAM:  Atraumatic Pain/Swelling/Deformity  Right heel pain and swelling    TECHNIQUE/EXAM DESCRIPTION AND NUMBER OF VIEWS:  3 nonweightbearing views of the RIGHT foot.    COMPARISON:  None    FINDINGS:  No acute fracture or malalignment. There is early enthesophyte formation on the calcaneus. The bones are osteopenic. There is soft tissue swelling in the forefoot.   Impression       1.  No acute bony findings.    2.  Osteopenia.    3.  Nonspecific forefoot edema.   Reading Provider Reading Date   Nadia Gill M.D. Nov 20, 2018          Assessment/Plan:     1. Pain of right heel  DX-FOOT-COMPLETE 3+ RIGHT    MethylPREDNISolone (MEDROL DOSEPAK) 4 MG Tablet Therapy Pack   2. Right leg pain  US-EXTREMITY VENOUS LOWER UNILAT RIGHT       -negative doppler and RAD for acute bony.   -recommend elevate, rest, gel heel orthotic.   -iced water bottle stretches discussed.    -rx as above.   -PCP follow up prn      Supportive care, differential diagnoses, and indications for immediate follow-up discussed with patient.   Pathogenesis of diagnosis discussed including typical length and natural progression.   Instructed to return to clinic or nearest emergency department for any change in condition, further concerns, or worsening of symptoms.  Patient states understanding of the plan of care and discharge instructions.      Katherine Muse P.A.-C.

## 2018-11-30 ENCOUNTER — TELEPHONE (OUTPATIENT)
Dept: NEUROLOGY | Facility: MEDICAL CENTER | Age: 78
End: 2018-11-30

## 2018-11-30 NOTE — TELEPHONE ENCOUNTER
The dizziness sounds improving somewhat  Up the Keppra 375 two times per day to control the dizziness and bad days ( around 2 times per week) 375mg = one and half of 250mg   If any side effects, please call us -- could go back to Keppra 250mg two times per day  If not helpful, please let us know    Son called patient has been having problems since increase in Keppra. I gave him above instructions per Dr Guerra. Patient will reduce back to old dose of Keppra 250 mg bid,and she has biopsy scheduled for next week. He will call for results.

## 2018-12-18 ENCOUNTER — APPOINTMENT (OUTPATIENT)
Dept: ADMISSIONS | Facility: MEDICAL CENTER | Age: 78
End: 2018-12-18
Payer: MEDICARE

## 2018-12-18 DIAGNOSIS — Z01.812 PRE-OPERATIVE LABORATORY EXAMINATION: ICD-10-CM

## 2018-12-18 DIAGNOSIS — Z01.810 PRE-OPERATIVE CARDIOVASCULAR EXAMINATION: ICD-10-CM

## 2018-12-18 LAB
ANION GAP SERPL CALC-SCNC: 9 MMOL/L (ref 0–11.9)
BUN SERPL-MCNC: 16 MG/DL (ref 8–22)
CALCIUM SERPL-MCNC: 9.5 MG/DL (ref 8.5–10.5)
CHLORIDE SERPL-SCNC: 104 MMOL/L (ref 96–112)
CO2 SERPL-SCNC: 26 MMOL/L (ref 20–33)
CREAT SERPL-MCNC: 0.75 MG/DL (ref 0.5–1.4)
EKG IMPRESSION: NORMAL
GLUCOSE SERPL-MCNC: 112 MG/DL (ref 65–99)
POTASSIUM SERPL-SCNC: 3.9 MMOL/L (ref 3.6–5.5)
SODIUM SERPL-SCNC: 139 MMOL/L (ref 135–145)

## 2018-12-18 PROCEDURE — 93010 ELECTROCARDIOGRAM REPORT: CPT | Performed by: INTERNAL MEDICINE

## 2018-12-18 PROCEDURE — 36415 COLL VENOUS BLD VENIPUNCTURE: CPT

## 2018-12-18 PROCEDURE — 80048 BASIC METABOLIC PNL TOTAL CA: CPT

## 2018-12-18 PROCEDURE — 93005 ELECTROCARDIOGRAM TRACING: CPT

## 2018-12-19 ENCOUNTER — HOSPITAL ENCOUNTER (OUTPATIENT)
Facility: MEDICAL CENTER | Age: 78
End: 2018-12-19
Attending: SURGERY | Admitting: SURGERY
Payer: MEDICARE

## 2018-12-19 VITALS
WEIGHT: 174.6 LBS | HEART RATE: 72 BPM | SYSTOLIC BLOOD PRESSURE: 145 MMHG | OXYGEN SATURATION: 93 % | HEIGHT: 65 IN | DIASTOLIC BLOOD PRESSURE: 63 MMHG | RESPIRATION RATE: 16 BRPM | TEMPERATURE: 99 F | BODY MASS INDEX: 29.09 KG/M2

## 2018-12-19 LAB — PATHOLOGY CONSULT NOTE: NORMAL

## 2018-12-19 PROCEDURE — 160036 HCHG PACU - EA ADDL 30 MINS PHASE I: Performed by: SURGERY

## 2018-12-19 PROCEDURE — 160046 HCHG PACU - 1ST 60 MINS PHASE II: Performed by: SURGERY

## 2018-12-19 PROCEDURE — 500002 HCHG ADHESIVE, DERMABOND: Performed by: SURGERY

## 2018-12-19 PROCEDURE — 88305 TISSUE EXAM BY PATHOLOGIST: CPT

## 2018-12-19 PROCEDURE — 501838 HCHG SUTURE GENERAL: Performed by: SURGERY

## 2018-12-19 PROCEDURE — 700101 HCHG RX REV CODE 250

## 2018-12-19 PROCEDURE — 700111 HCHG RX REV CODE 636 W/ 250 OVERRIDE (IP)

## 2018-12-19 PROCEDURE — 160002 HCHG RECOVERY MINUTES (STAT): Performed by: SURGERY

## 2018-12-19 PROCEDURE — 160035 HCHG PACU - 1ST 60 MINS PHASE I: Performed by: SURGERY

## 2018-12-19 PROCEDURE — 160028 HCHG SURGERY MINUTES - 1ST 30 MINS LEVEL 3: Performed by: SURGERY

## 2018-12-19 PROCEDURE — 700101 HCHG RX REV CODE 250: Performed by: ANESTHESIOLOGY

## 2018-12-19 PROCEDURE — 160025 RECOVERY II MINUTES (STATS): Performed by: SURGERY

## 2018-12-19 PROCEDURE — 160009 HCHG ANES TIME/MIN: Performed by: SURGERY

## 2018-12-19 PROCEDURE — 160039 HCHG SURGERY MINUTES - EA ADDL 1 MIN LEVEL 3: Performed by: SURGERY

## 2018-12-19 PROCEDURE — 160048 HCHG OR STATISTICAL LEVEL 1-5: Performed by: SURGERY

## 2018-12-19 RX ORDER — HALOPERIDOL 5 MG/ML
1 INJECTION INTRAMUSCULAR
Status: DISCONTINUED | OUTPATIENT
Start: 2018-12-19 | End: 2018-12-19 | Stop reason: HOSPADM

## 2018-12-19 RX ORDER — OXYCODONE HCL 5 MG/5 ML
10 SOLUTION, ORAL ORAL
Status: DISCONTINUED | OUTPATIENT
Start: 2018-12-19 | End: 2018-12-19 | Stop reason: HOSPADM

## 2018-12-19 RX ORDER — ALENDRONATE SODIUM 70 MG/1
70 TABLET ORAL
COMMUNITY
End: 2020-07-24

## 2018-12-19 RX ORDER — DIPHENHYDRAMINE HYDROCHLORIDE 50 MG/ML
12.5 INJECTION INTRAMUSCULAR; INTRAVENOUS
Status: DISCONTINUED | OUTPATIENT
Start: 2018-12-19 | End: 2018-12-19 | Stop reason: HOSPADM

## 2018-12-19 RX ORDER — SODIUM CHLORIDE, SODIUM LACTATE, POTASSIUM CHLORIDE, CALCIUM CHLORIDE 600; 310; 30; 20 MG/100ML; MG/100ML; MG/100ML; MG/100ML
INJECTION, SOLUTION INTRAVENOUS ONCE
Status: COMPLETED | OUTPATIENT
Start: 2018-12-19 | End: 2018-12-19

## 2018-12-19 RX ORDER — HYDROMORPHONE HYDROCHLORIDE 1 MG/ML
0.4 INJECTION, SOLUTION INTRAMUSCULAR; INTRAVENOUS; SUBCUTANEOUS
Status: DISCONTINUED | OUTPATIENT
Start: 2018-12-19 | End: 2018-12-19 | Stop reason: HOSPADM

## 2018-12-19 RX ORDER — MECLIZINE HYDROCHLORIDE 25 MG/1
25 TABLET ORAL 3 TIMES DAILY PRN
COMMUNITY
End: 2019-08-13

## 2018-12-19 RX ORDER — METOPROLOL TARTRATE 1 MG/ML
1 INJECTION, SOLUTION INTRAVENOUS
Status: DISCONTINUED | OUTPATIENT
Start: 2018-12-19 | End: 2018-12-19 | Stop reason: HOSPADM

## 2018-12-19 RX ORDER — METHYLPREDNISOLONE 4 MG/1
4 TABLET ORAL DAILY
COMMUNITY
Start: 2018-11-20 | End: 2019-02-13

## 2018-12-19 RX ORDER — ONDANSETRON 2 MG/ML
4 INJECTION INTRAMUSCULAR; INTRAVENOUS
Status: DISCONTINUED | OUTPATIENT
Start: 2018-12-19 | End: 2018-12-19 | Stop reason: HOSPADM

## 2018-12-19 RX ORDER — BUPIVACAINE HYDROCHLORIDE AND EPINEPHRINE 5; 5 MG/ML; UG/ML
INJECTION, SOLUTION EPIDURAL; INTRACAUDAL; PERINEURAL
Status: DISCONTINUED | OUTPATIENT
Start: 2018-12-19 | End: 2018-12-19 | Stop reason: HOSPADM

## 2018-12-19 RX ORDER — OXYCODONE HCL 5 MG/5 ML
5 SOLUTION, ORAL ORAL
Status: DISCONTINUED | OUTPATIENT
Start: 2018-12-19 | End: 2018-12-19 | Stop reason: HOSPADM

## 2018-12-19 RX ORDER — IPRATROPIUM BROMIDE AND ALBUTEROL SULFATE 2.5; .5 MG/3ML; MG/3ML
3 SOLUTION RESPIRATORY (INHALATION)
Status: DISCONTINUED | OUTPATIENT
Start: 2018-12-19 | End: 2018-12-19 | Stop reason: HOSPADM

## 2018-12-19 RX ORDER — HYDRALAZINE HYDROCHLORIDE 20 MG/ML
5 INJECTION INTRAMUSCULAR; INTRAVENOUS
Status: DISCONTINUED | OUTPATIENT
Start: 2018-12-19 | End: 2018-12-19 | Stop reason: HOSPADM

## 2018-12-19 RX ORDER — HYDROMORPHONE HYDROCHLORIDE 1 MG/ML
0.2 INJECTION, SOLUTION INTRAMUSCULAR; INTRAVENOUS; SUBCUTANEOUS
Status: DISCONTINUED | OUTPATIENT
Start: 2018-12-19 | End: 2018-12-19 | Stop reason: HOSPADM

## 2018-12-19 RX ORDER — MEPERIDINE HYDROCHLORIDE 25 MG/ML
12.5 INJECTION INTRAMUSCULAR; INTRAVENOUS; SUBCUTANEOUS
Status: DISCONTINUED | OUTPATIENT
Start: 2018-12-19 | End: 2018-12-19 | Stop reason: HOSPADM

## 2018-12-19 RX ORDER — MIDAZOLAM HYDROCHLORIDE 1 MG/ML
1 INJECTION INTRAMUSCULAR; INTRAVENOUS
Status: DISCONTINUED | OUTPATIENT
Start: 2018-12-19 | End: 2018-12-19 | Stop reason: HOSPADM

## 2018-12-19 RX ORDER — SODIUM CHLORIDE, SODIUM LACTATE, POTASSIUM CHLORIDE, CALCIUM CHLORIDE 600; 310; 30; 20 MG/100ML; MG/100ML; MG/100ML; MG/100ML
INJECTION, SOLUTION INTRAVENOUS CONTINUOUS
Status: DISCONTINUED | OUTPATIENT
Start: 2018-12-19 | End: 2018-12-19 | Stop reason: HOSPADM

## 2018-12-19 RX ORDER — LEVETIRACETAM 250 MG/1
250 TABLET ORAL 2 TIMES DAILY
COMMUNITY
End: 2019-04-11 | Stop reason: SDUPTHER

## 2018-12-19 RX ORDER — HYDROMORPHONE HYDROCHLORIDE 1 MG/ML
0.1 INJECTION, SOLUTION INTRAMUSCULAR; INTRAVENOUS; SUBCUTANEOUS
Status: DISCONTINUED | OUTPATIENT
Start: 2018-12-19 | End: 2018-12-19 | Stop reason: HOSPADM

## 2018-12-19 RX ADMIN — SODIUM CHLORIDE, SODIUM LACTATE, POTASSIUM CHLORIDE, CALCIUM CHLORIDE: 600; 310; 30; 20 INJECTION, SOLUTION INTRAVENOUS at 11:49

## 2018-12-19 RX ADMIN — IPRATROPIUM BROMIDE AND ALBUTEROL SULFATE 3 ML: .5; 3 SOLUTION RESPIRATORY (INHALATION) at 16:31

## 2018-12-19 ASSESSMENT — PAIN SCALES - GENERAL
PAINLEVEL_OUTOF10: 0

## 2018-12-19 NOTE — OR NURSING
Patient allergies and NPO status verified. Home medication reconciliation completed and belongings secured. Patient verbalizes understanding of pain scale, expected course of stay and plan of care. Surgical site verified with patient. IV access established. Call light within reach. No further needs at this time; hourly rounding in place.

## 2018-12-19 NOTE — H&P
Date of Service:  12/18/18    PCP: Orestes Warren M.D.    CC:  headaches    HPI: This is a 78 y.o. female with a life long history of headaches.  Her headaches accompanied by dizziness have increased over the past 3 years. She was seen by Dr. Mina in the office.  She was referred for temporal artery biopsy.    ROS: As above. The remainder of a complete review of systems is negative in all systems except as noted.    PMHx:  Active Ambulatory Problems     Diagnosis Date Noted   • Essential hypertension 01/25/2017   • Chronic left shoulder pain 01/25/2017   • Acquired hypothyroidism 01/25/2017   • Dyslipidemia 01/25/2017   • Episode of recurrent major depressive disorder (HCC) 01/25/2017   • Chronic obstructive pulmonary disease (HCC) 01/25/2017   • Non morbid obesity due to excess calories 01/25/2017   • Alzheimer's dementia without behavioral disturbance 02/08/2017   • IGT (impaired glucose tolerance) 04/03/2017   • Vitamin B12 deficiency 04/21/2017   • Osteoporosis without current pathological fracture 06/30/2017   • Risk for falls 02/14/2018   • Stress incontinence of urine 08/14/2018   • Temporal arteritis (HCC) 11/05/2018     Resolved Ambulatory Problems     Diagnosis Date Noted   • Alzheimer's dementia without behavioral disturbance 01/25/2017   • IGT (impaired glucose tolerance) 01/25/2017     Past Medical History:   Diagnosis Date   • Arthritis    • Asthma    • Breath shortness    • COPD (chronic obstructive pulmonary disease) ([facility]ly [facility])    • Dental disorder    • Disorder of thyroid    • Emphysema of lung ([facility])    • High cholesterol    • Hypertension    • Pain        SHx:  Social History     Social History   • Marital status:      Spouse name: N/A   • Number of children: N/A   • Years of education: N/A     Occupational History   • Not on file.     Social History Main Topics   • Smoking status: Never Smoker   • Smokeless tobacco: Never Used   • Alcohol use No   • Drug use: No   • Sexual activity: Yes      Partners: Male     Other Topics Concern   • Not on file     Social History Narrative   • No narrative on file       FHx:  family history includes Heart Disease in her brother; No Known Problems in her father; Stroke in her mother.    Allergies:  No Known Allergies    Medications:  No current facility-administered medications on file prior to encounter.      Current Outpatient Prescriptions on File Prior to Encounter   Medication Sig Dispense Refill   • memantine (NAMENDA) 5 MG Tab Take 1 Tab by mouth 2 times a day. TWICE DAILY 180 Tab 1   • amLODIPine (NORVASC) 10 MG Tab TAKE ONE TABLET BY MOUTH ONCE DAILY 90 Tab 3   • levothyroxine (SYNTHROID) 125 MCG Tab TAKE ONE TABLET BY MOUTH ONCE DAILY IN THE MORNING ON  AN  EMPTY  STOMACH 90 Tab 3   • folic acid (FOLVITE) 1 MG Tab TAKE ONE TABLET BY MOUTH ONCE DAILY 90 Tab 3   • ipratropium (ATROVENT) 0.02 % Solution INHALE ONE VIAL IN NEBULIZER TWICE DAILY 300 Vial 6   • albuterol (PROVENTIL) 2.5mg/3ml Nebu Soln solution for nebulization USE ONE VIAL IN NEBULIZER EVERY 4 HOURS AS NEEDED FOR SHORTNESS OF BREATH 300 Bullet 6   • PROAIR  (90 Base) MCG/ACT Aero Soln inhalation aerosol INHALE TWO PUFFS BY MOUTH EVERY 6 HOURS AS NEEDED FOR SHORTNESS OF BREATH 9 Inhaler 3   • metoprolol SR (TOPROL XL) 100 MG TABLET SR 24 HR TAKE ONE TABLET BY MOUTH ONCE DAILY 90 Tab 3   • pravastatin (PRAVACHOL) 40 MG tablet TAKE ONE TABLET BY MOUTH ONCE DAILY 90 Tab 3   • citalopram (CELEXA) 20 MG Tab TAKE ONE TABLET BY MOUTH ONCE DAILY 90 Tab 3   • SYMBICORT 160-4.5 MCG/ACT Aerosol INHALE ONE PUFF BY MOUTH TWICE DAILY 6 g 6   • acetaminophen (TYLENOL) 500 MG Tab Take 1,000 mg by mouth every 6 hours as needed for Moderate Pain. 30 Tab 0   • enalapril (VASOTEC) 20 MG tablet Take 1 Tab by mouth 2 times a day. 180 Tab 3       Objective Exam:  Vitals:    12/19/18 0950 12/19/18 1114   BP: 145/63    Pulse: 67    Resp: 18    Temp: 36.9 °C (98.4 °F)    TempSrc: Temporal    SpO2: 96%    Weight:   "79.2 kg (174 lb 9.7 oz)   Height:  1.651 m (5' 5\")       Review of Systems        General: Awake and Alert  HEENT: normocephalic, atraumatic  Chest: Clear and equal bilaterally  CV: RRR  Abd: Soft, non-tender, nondistended  Ext: Warm, well perfused   Neuro: Intact  Skin: normal    Laboratory--reviewed personally and are as follows:  Lab Results   Component Value Date/Time    WBC 11.0 (H) 02/08/2017 09:30 AM    RBC 4.60 02/08/2017 09:30 AM    HEMOGLOBIN 14.1 02/08/2017 09:30 AM    HEMATOCRIT 44.8 02/08/2017 09:30 AM    MCV 97.4 02/08/2017 09:30 AM    MCH 30.7 02/08/2017 09:30 AM    MCHC 31.5 (L) 02/08/2017 09:30 AM    MPV 11.5 02/08/2017 09:30 AM    NEUTSPOLYS 63.80 02/08/2017 09:30 AM    LYMPHOCYTES 21.00 (L) 02/08/2017 09:30 AM    MONOCYTES 8.70 02/08/2017 09:30 AM    EOSINOPHILS 5.20 02/08/2017 09:30 AM    BASOPHILS 0.80 02/08/2017 09:30 AM      Lab Results   Component Value Date/Time    SODIUM 139 12/18/2018 10:30 AM    POTASSIUM 3.9 12/18/2018 10:30 AM    CHLORIDE 104 12/18/2018 10:30 AM    CO2 26 12/18/2018 10:30 AM    GLUCOSE 112 (H) 12/18/2018 10:30 AM    BUN 16 12/18/2018 10:30 AM    CREATININE 0.75 12/18/2018 10:30 AM      No results found for: PROTHROMBTM, INR         MDM:  78 y.o. female here with headaches and dizziness      Plan:  Left Temporal Artery Biopsy   "

## 2018-12-19 NOTE — PROGRESS NOTES
Med rec updated and complete  Allergies reviewed  Interviewed pt with  at bedside with permission from pt.  Pts  had a list of medications at bedside, went over list of medications and returned list of medications back to pts .  Pts  reports no antibiotics in the last 30 days.  Pts  reports that pt is not taking ZONISAMIDE 50MG

## 2018-12-20 NOTE — OR NURSING
Pt arrived to phase II. Pt states that she has no complaints of pain or nausea. Pulse ox probe moved to ear, pt O2 sats ranging from 95-99% on RA. Pt states that she is motivated to go home. D/C instructions given to pt and family, verbalized understanding. PIV removed prior to D/C.

## 2018-12-20 NOTE — DISCHARGE INSTRUCTIONS
ACTIVITY: Rest and take it easy for the first 24 hours.  A responsible adult is recommended to remain with you during that time.  It is normal to feel sleepy.  We encourage you to not do anything that requires balance, judgment or coordination.    MILD FLU-LIKE SYMPTOMS ARE NORMAL. YOU MAY EXPERIENCE GENERALIZED MUSCLE ACHES, THROAT IRRITATION, HEADACHE AND/OR SOME NAUSEA.    FOR 24 HOURS DO NOT:  Drive, operate machinery or run household appliances.  Drink beer or alcoholic beverages.   Make important decisions or sign legal documents.    SPECIAL INSTRUCTIONS: D/C instructions:    1. DIET: Upon discharge from the hospital you may resume your normal preoperative diet. Depending on how you are feeling and whether you have nausea or not, you may wish to stay with a bland diet for the first few days. However, you can advance this as quickly as you feel ready.    2. ACTIVITIES: After discharge from the hospital, you may resume full routine activities. However, there should be no heavy lifting (greater than 15 pounds) and no strenuous activities until after your follow-up visit. Otherwise, routine activities of daily living are acceptable.    3. DRIVING: You may drive whenever you are off pain medications and are able to perform the activities needed to drive, i.e. turning, bending, twisting, etc.    4. BATHING: You may get the wound wet at any time after leaving the hospital. You may shower, but do not submerge in a bath for at least a week. Dressings may come off after 48 hours. Leave steri strips on until they fall off.  It may be necessary to trim the edges.     5. BOWEL FUNCTION: Constipation is common after an operation, especially with pain medications. The combination of pain medication and decreased activity level can cause constipation in otherwise normal patients. If you feel this is occurring, take a laxative (Milk of Magnesia, Ex-Lax, Senokot, etc.) until the problem has resolved.    6. PAIN MEDICATION:  You will be given a prescription for pain medication at discharge. Please take these as directed. It is important to remember not to take medications on an empty stomach as this may cause nausea.    7.CALL IF YOU HAVE: (1) Fevers to more than 1010 F, (2) Unusual chest or leg pain, (3) Drainage or fluid from incision that may be foul smelling, increased tenderness or soreness at the wound or the wound edges are no longer together, redness or swelling at the incision site. Please do not hesitate to call with any other questions.     8. APPOINTMENT: Contact our office at [phone] for a follow-up appointment in 2 weeks following your procedure.    If you have any additional questions, please do not hesitate to call the office and speak to either myself or the physician on call.    Office address:   [facility] [address]  Orestes Mina MD  [facility]  [phone]      DIET: To avoid nausea, slowly advance diet as tolerated, avoiding spicy or greasy foods for the first day.  Add more substantial food to your diet according to your physician's instructions.INCREASE FLUIDS AND FIBER TO AVOID CONSTIPATION.    SURGICAL DRESSING/BATHING: You may get the wound wet at any time after leaving the hospital. You may shower, but do not submerge in a bath for at least a week. Dressings may come off after 48 hours    FOLLOW-UP APPOINTMENT:  A follow-up appointment should be arranged with your doctor in 1-2 weeks; call to schedule.    You should CALL YOUR PHYSICIAN if you develop:  Fever greater than 101 degrees F.  Pain not relieved by medication, or persistent nausea or vomiting.  Excessive bleeding (blood soaking through dressing) or unexpected drainage from the wound.  Extreme redness or swelling around the incision site, drainage of pus or foul smelling drainage.  Inability to urinate or empty your bladder within 8 hours.  Problems with breathing or chest pain.    You should call 911 if you develop problems with  breathing or chest pain.  If you are unable to contact your doctor or surgical center, you should go to the nearest emergency room or urgent care center.  Physician's telephone #: DR Mina [phone]    If any questions arise, call your doctor.  If your doctor is not available, please feel free to call the Surgical Center at [phone].  The Center is open Monday through Friday from 7AM to 7PM.  You can also call the HEALTH HOTLINE open 24 hours/day, 7 days/week and speak to a nurse at [phone], or toll free at [phone].    A registered nurse may call you a few days after your surgery to see how you are doing after your procedure.    MEDICATIONS: Resume taking daily medication.  Take prescribed pain medication with food.  If no medication is prescribed, you may take non-aspirin pain medication if needed.  PAIN MEDICATION CAN BE VERY CONSTIPATING.  Take a stool softener or laxative such as senokot, pericolace, or milk of magnesia if needed.    Take motrin and tylenol for pain      If your physician has prescribed pain medication that includes Acetaminophen (Tylenol), do not take additional Acetaminophen (Tylenol) while taking the prescribed medication.    Depression / Suicide Risk    As you are discharged from this [facility] facility, it is important to learn how to keep safe from harming yourself.    Recognize the warning signs:  · Abrupt changes in personality, positive or negative- including increase in energy   · Giving away possessions  · Change in eating patterns- significant weight changes-  positive or negative  · Change in sleeping patterns- unable to sleep or sleeping all the time   · Unwillingness or inability to communicate  · Depression  · Unusual sadness, discouragement and loneliness  · Talk of wanting to die  · Neglect of personal appearance   · Rebelliousness- reckless behavior  · Withdrawal from people/activities they love  · Confusion- inability to concentrate     If you or a  loved one observes any of these behaviors or has concerns about self-harm, here's what you can do:  · Talk about it- your feelings and reasons for harming yourself  · Remove any means that you might use to hurt yourself (examples: pills, rope, extension cords, firearm)  · Get professional help from the community (Mental Health, Substance Abuse, psychological counseling)  · Do not be alone:Call your Safe Contact- someone whom you trust who will be there for you.  · Call your local CRISIS HOTLINE 665-5435 or 023-609-8422  · Call your local Children's Mobile Crisis Response Team [place] (543) 376-2978 or www.[employer]  · Call the toll free National Suicide Prevention Hotlines   · National Suicide Prevention Lifeline 619-589-OAFE (7165)  · National Hope Line Network 800-SUICIDE (751-5822)

## 2018-12-20 NOTE — OR NURSING
Patient doing well. Awake and alert. Pt has no pain or nausea. Pt incision site to left temple is closed with dermabond and CDI. VSS. Pt oxygen in low 90s and drops to 88. Pt currently working on incentive spirometer and sats improving. Pt tolerating water without issue. Sitting up in bed and is comfortable. Pt  updated on plan of care and will continue to monitor.

## 2018-12-21 NOTE — OP REPORT
12/19/18    OPERATIVE NOTE    PRE-OPERATIVE DIAGNOSIS -headache, possible temporal arteritis    POST-OPERATIVE DIAGNOSIS -same    PROCEDURE PERFORMED -left temporal artery biopsy    SURGEON - Orestes Mina M.D.    ASSISTANT - ERICA Ruiz    TYPE OF ANESTHESIA - General     ANESTHESIOLOGIST  -Dr. Elais      SPECIMENS -left temporal artery    INDICATIONS - 78 y.o.      PROCEDURE IN DETAIL -patient was taken to the operating suite placed in the supine position.  After adequate anesthesia the patient's left temporal region was shaved prepped and draped in sterile fashion.  Small incision was made in longitudinal fashion overlying the temporal artery.  The incision was carried down through the subcutaneous tissue and the temporal artery was identified.  Temporal artery was dissected for approximately 2-3 cm.  Multiple branches were clipped using 5 mm clips.  Once the artery was fully mobilized it was ligated proximally and distally and resected as specimen.  The area was irrigated hemostasis was assured.  4-0 Vicryl subcutaneous sutures were placed followed by Dermabond.  Patient tolerated procedure well peer      Orestes Mina M.D.

## 2019-01-02 ENCOUNTER — HOSPITAL ENCOUNTER (OUTPATIENT)
Dept: LAB | Facility: MEDICAL CENTER | Age: 79
End: 2019-01-02
Attending: INTERNAL MEDICINE
Payer: MEDICARE

## 2019-01-02 DIAGNOSIS — E78.5 DYSLIPIDEMIA: ICD-10-CM

## 2019-01-02 DIAGNOSIS — R73.02 IGT (IMPAIRED GLUCOSE TOLERANCE): ICD-10-CM

## 2019-01-02 DIAGNOSIS — E03.9 ACQUIRED HYPOTHYROIDISM: ICD-10-CM

## 2019-01-02 LAB
ALBUMIN SERPL BCP-MCNC: 4.1 G/DL (ref 3.2–4.9)
ALBUMIN/GLOB SERPL: 1.3 G/DL
ALP SERPL-CCNC: 84 U/L (ref 30–99)
ALT SERPL-CCNC: 13 U/L (ref 2–50)
ANION GAP SERPL CALC-SCNC: 10 MMOL/L (ref 0–11.9)
AST SERPL-CCNC: 19 U/L (ref 12–45)
BILIRUB SERPL-MCNC: 0.7 MG/DL (ref 0.1–1.5)
BUN SERPL-MCNC: 12 MG/DL (ref 8–22)
CALCIUM SERPL-MCNC: 9.7 MG/DL (ref 8.5–10.5)
CHLORIDE SERPL-SCNC: 106 MMOL/L (ref 96–112)
CHOLEST SERPL-MCNC: 129 MG/DL (ref 100–199)
CO2 SERPL-SCNC: 25 MMOL/L (ref 20–33)
CREAT SERPL-MCNC: 0.6 MG/DL (ref 0.5–1.4)
EST. AVERAGE GLUCOSE BLD GHB EST-MCNC: 120 MG/DL
FASTING STATUS PATIENT QL REPORTED: NORMAL
GLOBULIN SER CALC-MCNC: 3.2 G/DL (ref 1.9–3.5)
GLUCOSE SERPL-MCNC: 113 MG/DL (ref 65–99)
HBA1C MFR BLD: 5.8 % (ref 0–5.6)
HDLC SERPL-MCNC: 50 MG/DL
LDLC SERPL CALC-MCNC: 59 MG/DL
POTASSIUM SERPL-SCNC: 3.7 MMOL/L (ref 3.6–5.5)
PROT SERPL-MCNC: 7.3 G/DL (ref 6–8.2)
SODIUM SERPL-SCNC: 141 MMOL/L (ref 135–145)
TRIGL SERPL-MCNC: 100 MG/DL (ref 0–149)
TSH SERPL DL<=0.005 MIU/L-ACNC: 2.41 UIU/ML (ref 0.38–5.33)

## 2019-01-02 PROCEDURE — 80061 LIPID PANEL: CPT

## 2019-01-02 PROCEDURE — 83036 HEMOGLOBIN GLYCOSYLATED A1C: CPT

## 2019-01-02 PROCEDURE — 84443 ASSAY THYROID STIM HORMONE: CPT

## 2019-01-02 PROCEDURE — 36415 COLL VENOUS BLD VENIPUNCTURE: CPT

## 2019-01-02 PROCEDURE — 80053 COMPREHEN METABOLIC PANEL: CPT

## 2019-01-02 RX ORDER — CITALOPRAM 20 MG/1
TABLET ORAL
Qty: 90 TAB | Refills: 3 | Status: SHIPPED | OUTPATIENT
Start: 2019-01-02 | End: 2020-01-02

## 2019-01-16 RX ORDER — PRAVASTATIN SODIUM 40 MG
TABLET ORAL
Qty: 90 TAB | Refills: 3 | Status: SHIPPED | OUTPATIENT
Start: 2019-01-16 | End: 2019-10-29 | Stop reason: SDUPTHER

## 2019-01-16 RX ORDER — METOPROLOL SUCCINATE 100 MG/1
TABLET, EXTENDED RELEASE ORAL
Qty: 90 TAB | Refills: 3 | Status: SHIPPED | OUTPATIENT
Start: 2019-01-16 | End: 2020-08-07 | Stop reason: SDUPTHER

## 2019-02-07 RX ORDER — BUDESONIDE AND FORMOTEROL FUMARATE DIHYDRATE 160; 4.5 UG/1; UG/1
AEROSOL RESPIRATORY (INHALATION)
Qty: 1 INHALER | Refills: 3 | Status: SHIPPED | OUTPATIENT
Start: 2019-02-07 | End: 2019-08-13

## 2019-02-12 PROBLEM — G40.909 SEIZURE DISORDER (HCC): Status: ACTIVE | Noted: 2019-02-12

## 2019-02-12 PROBLEM — I70.0 ATHEROSCLEROSIS OF AORTA (HCC): Status: ACTIVE | Noted: 2019-02-12

## 2019-02-13 ENCOUNTER — OFFICE VISIT (OUTPATIENT)
Dept: MEDICAL GROUP | Facility: MEDICAL CENTER | Age: 79
End: 2019-02-13
Payer: MEDICARE

## 2019-02-13 VITALS
HEIGHT: 65 IN | BODY MASS INDEX: 28.99 KG/M2 | SYSTOLIC BLOOD PRESSURE: 126 MMHG | DIASTOLIC BLOOD PRESSURE: 66 MMHG | WEIGHT: 174 LBS | TEMPERATURE: 99.1 F | OXYGEN SATURATION: 95 % | HEART RATE: 66 BPM

## 2019-02-13 DIAGNOSIS — J44.9 CHRONIC OBSTRUCTIVE PULMONARY DISEASE, UNSPECIFIED COPD TYPE (HCC): ICD-10-CM

## 2019-02-13 DIAGNOSIS — E03.9 ACQUIRED HYPOTHYROIDISM: ICD-10-CM

## 2019-02-13 DIAGNOSIS — Z23 NEED FOR VACCINATION: ICD-10-CM

## 2019-02-13 DIAGNOSIS — I70.0 ATHEROSCLEROSIS OF AORTA (HCC): ICD-10-CM

## 2019-02-13 DIAGNOSIS — R73.02 IGT (IMPAIRED GLUCOSE TOLERANCE): ICD-10-CM

## 2019-02-13 DIAGNOSIS — M19.90 ARTHRITIS PAIN: ICD-10-CM

## 2019-02-13 DIAGNOSIS — G40.909 SEIZURE DISORDER (HCC): ICD-10-CM

## 2019-02-13 DIAGNOSIS — F33.9 EPISODE OF RECURRENT MAJOR DEPRESSIVE DISORDER, UNSPECIFIED DEPRESSION EPISODE SEVERITY (HCC): ICD-10-CM

## 2019-02-13 DIAGNOSIS — N39.46 MIXED INCONTINENCE: ICD-10-CM

## 2019-02-13 DIAGNOSIS — M31.6 TEMPORAL ARTERITIS (HCC): ICD-10-CM

## 2019-02-13 PROCEDURE — 99214 OFFICE O/P EST MOD 30 MIN: CPT | Mod: 25 | Performed by: INTERNAL MEDICINE

## 2019-02-13 PROCEDURE — G0008 ADMIN INFLUENZA VIRUS VAC: HCPCS | Performed by: INTERNAL MEDICINE

## 2019-02-13 PROCEDURE — 8041 PR SCP AHA: Performed by: INTERNAL MEDICINE

## 2019-02-13 PROCEDURE — 90662 IIV NO PRSV INCREASED AG IM: CPT | Performed by: INTERNAL MEDICINE

## 2019-02-13 RX ORDER — FOLIC ACID 1 MG/1
TABLET ORAL
Qty: 90 TAB | Refills: 3 | Status: SHIPPED | OUTPATIENT
Start: 2019-02-13 | End: 2020-02-12 | Stop reason: SDUPTHER

## 2019-02-13 NOTE — PROGRESS NOTES
Annual Health Assessment Questions:    1.  Are you currently engaging in any exercise or physical activity? Yes    2.  How would you describe your mood or emotional well-being today? good    3.  Have you had any falls in the last year? Yes    4.  Have you noticed any problems with your balance or had difficulty walking? Yes    5.  In the last six months have you experienced any leakage of urine? Yes    6. DPA/Advanced Directive: Patient has Durable Power of , but it is not on file. Instructed to bring in a copy to scan into their chart.    CC: Follow-up blood sugars, thyroid complaining of incontinence and hand pain.    HPI:   [place] presents today with the following.    1. IGT (impaired glucose tolerance)  Presents after having blood work A1c showed to be 5.8 was slightly elevated fasting sugars.  Denying any symptoms of diabetes changes to vision or numbness in the feet.    2. Acquired hypothyroidism  Thyroid slightly under replaced fearful that any changes to medication will likely go the other direction she is denying excessive fatigue or hair or skin changes.    3. Mixed incontinence  She is complaining of incontinence mostly stress but does sound occasional urge.  She does have an underlying dementia making medications more concerning.  She denies any abdominal or flank pain no pain with urination.    4. Arthritis pain  Biggest complaint is bilateral hand pain.  Usually happens throughout the day is worse with activity.  Pain can be 6 out of 10 in intensity are using some creams to some success.  Denying any recent injuries.        Patient Active Problem List    Diagnosis Date Noted   • Seizure disorder ([facility]) 02/12/2019   • Atherosclerosis of aorta ([facility]) 02/12/2019   • Temporal arteritis ([facility]) 11/05/2018   • Mixed incontinence 08/14/2018   • Risk for falls 02/14/2018   • Osteoporosis without current pathological fracture 06/30/2017   • Vitamin B12 deficiency 04/21/2017   • IGT (impaired glucose  tolerance) 04/03/2017   • Alzheimer's dementia without behavioral disturbance 02/08/2017   • Essential hypertension 01/25/2017   • Chronic left shoulder pain 01/25/2017   • Acquired hypothyroidism 01/25/2017   • Dyslipidemia 01/25/2017   • Episode of recurrent major depressive disorder (HCC) 01/25/2017   • Chronic obstructive pulmonary disease (HCC) 01/25/2017   • Non morbid obesity due to excess calories 01/25/2017       Current Outpatient Prescriptions   Medication Sig Dispense Refill   • folic acid (FOLVITE) 1 MG Tab TAKE 1 TABLET BY MOUTH ONCE DAILY 90 Tab 3   • Diclofenac Sodium (VOLTAREN) 1 % Gel Apply 1 g to skin as directed 4 times a day. 5 Tube 6   • metoprolol SR (TOPROL XL) 100 MG TABLET SR 24 HR TAKE ONE TABLET BY MOUTH ONCE DAILY 90 Tab 3   • pravastatin (PRAVACHOL) 40 MG tablet TAKE ONE TABLET BY MOUTH ONCE DAILY 90 Tab 3   • citalopram (CELEXA) 20 MG Tab TAKE ONE TABLET BY MOUTH ONCE DAILY 90 Tab 3   • alendronate (FOSAMAX) 70 MG Tab Take 70 mg by mouth every 7 days. Sunday     • levETIRAcetam (KEPPRA) 250 MG tablet Take 250 mg by mouth 2 times a day.     • meclizine (ANTIVERT) 25 MG Tab Take 25 mg by mouth 3 times a day as needed for Vertigo.     • memantine (NAMENDA) 5 MG Tab Take 1 Tab by mouth 2 times a day. TWICE DAILY 180 Tab 1   • amLODIPine (NORVASC) 10 MG Tab TAKE ONE TABLET BY MOUTH ONCE DAILY 90 Tab 3   • levothyroxine (SYNTHROID) 125 MCG Tab TAKE ONE TABLET BY MOUTH ONCE DAILY IN THE MORNING ON  AN  EMPTY  STOMACH 90 Tab 3   • ipratropium (ATROVENT) 0.02 % Solution INHALE ONE VIAL IN NEBULIZER TWICE DAILY 300 Vial 6   • albuterol (PROVENTIL) 2.5mg/3ml Nebu Soln solution for nebulization USE ONE VIAL IN NEBULIZER EVERY 4 HOURS AS NEEDED FOR SHORTNESS OF BREATH 300 Bullet 6   • PROAIR  (90 Base) MCG/ACT Aero Soln inhalation aerosol INHALE TWO PUFFS BY MOUTH EVERY 6 HOURS AS NEEDED FOR SHORTNESS OF BREATH 9 Inhaler 3   • SYMBICORT 160-4.5 MCG/ACT Aerosol INHALE ONE PUFF BY MOUTH TWICE  "DAILY 6 g 6   • acetaminophen (TYLENOL) 500 MG Tab Take 1,000 mg by mouth every 6 hours as needed for Moderate Pain. 30 Tab 0   • enalapril (VASOTEC) 20 MG tablet Take 1 Tab by mouth 2 times a day. 180 Tab 3   • SYMBICORT 160-4.5 MCG/ACT Aerosol INHALE ONE PUFF BY MOUTH TWICE DAILY (Patient not taking: Reported on 2/13/2019) 1 Inhaler 3     No current facility-administered medications for this visit.          Allergies as of 02/13/2019   • (No Known Allergies)        ROS: Denies Chest pain, SOB, LE edema.    /66 (BP Location: Left arm, Patient Position: Sitting)   Pulse 66   Temp 37.3 °C (99.1 °F)   Ht 1.651 m (5' 5\")   Wt 78.9 kg (174 lb)   SpO2 95%   BMI 28.96 kg/m²     Physical Exam:  Gen:         Alert and oriented, No apparent distress.  Neck:        No Lymphadenopathy or Bruits.  Lungs:     Clear to auscultation bilaterally  CV:          Regular rate and rhythm. No murmurs, rubs or gallops.               Ext:          No clubbing, cyanosis, edema.      Assessment and Plan.   78 y.o. female with the following issues.    1. IGT (impaired glucose tolerance)  Clinically doing well no change in medication.    2. Acquired hypothyroidism  Continue current dose of thyroid medication.    3. Mixed incontinence  [place] about time voiding and [place] exercises.    4. Arthritis pain  Seen on topical anti-inflammatory.  Cautioned about side effects and avoid getting into the eyes.  - Diclofenac Sodium (VOLTAREN) 1 % Gel; Apply 1 g to skin as directed 4 times a day.  Dispense: 5 Tube; Refill: 6    5. Episode of recurrent major depressive disorder, unspecified depression episode severity (HCC)  Clinically doing well    6. Chronic obstructive pulmonary disease, unspecified COPD type (HCC)  Maintain on the inhalers stable    7. Temporal arteritis ([facility])  Denying any new advancing symptomology.    8. Seizure disorder ([facility])  Followed by neurology    9. Atherosclerosis of aorta ([facility])  Continue risk reduction    10. " Need for vaccination    - INFLUENZA VACCINE, HIGH DOSE (65+ ONLY)

## 2019-02-20 ENCOUNTER — APPOINTMENT (RX ONLY)
Dept: URBAN - METROPOLITAN AREA CLINIC 22 | Facility: CLINIC | Age: 79
Setting detail: DERMATOLOGY
End: 2019-02-20

## 2019-02-20 DIAGNOSIS — L57.0 ACTINIC KERATOSIS: ICD-10-CM

## 2019-02-20 DIAGNOSIS — L82.1 OTHER SEBORRHEIC KERATOSIS: ICD-10-CM

## 2019-02-20 DIAGNOSIS — D22 MELANOCYTIC NEVI: ICD-10-CM

## 2019-02-20 DIAGNOSIS — L259 CONTACT DERMATITIS AND OTHER ECZEMA, UNSPECIFIED CAUSE: ICD-10-CM

## 2019-02-20 DIAGNOSIS — Z71.89 OTHER SPECIFIED COUNSELING: ICD-10-CM

## 2019-02-20 DIAGNOSIS — L30.4 ERYTHEMA INTERTRIGO: ICD-10-CM

## 2019-02-20 DIAGNOSIS — L81.4 OTHER MELANIN HYPERPIGMENTATION: ICD-10-CM

## 2019-02-20 DIAGNOSIS — D18.0 HEMANGIOMA: ICD-10-CM

## 2019-02-20 PROBLEM — D22.5 MELANOCYTIC NEVI OF TRUNK: Status: ACTIVE | Noted: 2019-02-20

## 2019-02-20 PROBLEM — D04.61 CARCINOMA IN SITU OF SKIN OF RIGHT UPPER LIMB, INCLUDING SHOULDER: Status: ACTIVE | Noted: 2019-02-20

## 2019-02-20 PROBLEM — D18.01 HEMANGIOMA OF SKIN AND SUBCUTANEOUS TISSUE: Status: ACTIVE | Noted: 2019-02-20

## 2019-02-20 PROBLEM — L30.8 OTHER SPECIFIED DERMATITIS: Status: ACTIVE | Noted: 2019-02-20

## 2019-02-20 PROBLEM — Z85.828 PERSONAL HISTORY OF OTHER MALIGNANT NEOPLASM OF SKIN: Status: ACTIVE | Noted: 2019-02-20

## 2019-02-20 PROBLEM — L85.3 XEROSIS CUTIS: Status: ACTIVE | Noted: 2019-02-20

## 2019-02-20 PROBLEM — D48.5 NEOPLASM OF UNCERTAIN BEHAVIOR OF SKIN: Status: ACTIVE | Noted: 2019-02-20

## 2019-02-20 PROCEDURE — 17260 DSTRJ MAL LES T/A/L 0.5 CM/<: CPT | Mod: 59

## 2019-02-20 PROCEDURE — ? PRESCRIPTION

## 2019-02-20 PROCEDURE — ? COUNSELING

## 2019-02-20 PROCEDURE — 17004 DESTROY PREMAL LESIONS 15/>: CPT

## 2019-02-20 PROCEDURE — ? CRYOSURGICAL DESTRUCTION

## 2019-02-20 PROCEDURE — 11102 TANGNTL BX SKIN SINGLE LES: CPT | Mod: 59

## 2019-02-20 PROCEDURE — ? TREATMENT REGIMEN

## 2019-02-20 PROCEDURE — ? REFERRAL CORRESPONDENCE

## 2019-02-20 PROCEDURE — ? BIOPSY BY SHAVE METHOD

## 2019-02-20 PROCEDURE — ? LIQUID NITROGEN

## 2019-02-20 PROCEDURE — 99214 OFFICE O/P EST MOD 30 MIN: CPT | Mod: 25

## 2019-02-20 RX ORDER — NYSTATIN 100000 [USP'U]/G
POWDER TOPICAL TID
Qty: 1 | Refills: 3 | Status: ERX | COMMUNITY
Start: 2019-02-20

## 2019-02-20 RX ORDER — TRIAMCINOLONE ACETONIDE 1 MG/G
CREAM TOPICAL BID
Qty: 1 | Refills: 1 | Status: ERX | COMMUNITY
Start: 2019-02-20

## 2019-02-20 RX ORDER — NYSTATIN 100000 [USP'U]/G
CREAM TOPICAL TID
Qty: 1 | Refills: 2 | Status: ERX | COMMUNITY
Start: 2019-02-20

## 2019-02-20 RX ADMIN — TRIAMCINOLONE ACETONIDE: 1 CREAM TOPICAL at 17:21

## 2019-02-20 RX ADMIN — NYSTATIN: 100000 CREAM TOPICAL at 17:23

## 2019-02-20 RX ADMIN — NYSTATIN: 100000 POWDER TOPICAL at 17:24

## 2019-02-20 ASSESSMENT — LOCATION DETAILED DESCRIPTION DERM
LOCATION DETAILED: LEFT ANTERIOR PROXIMAL THIGH
LOCATION DETAILED: LEFT PROXIMAL DORSAL FOREARM
LOCATION DETAILED: LEFT FOREHEAD
LOCATION DETAILED: 2ND WEB SPACE LEFT HAND
LOCATION DETAILED: LEFT DISTAL DORSAL FOREARM
LOCATION DETAILED: LEFT RADIAL DORSAL HAND
LOCATION DETAILED: RIGHT RADIAL DORSAL HAND
LOCATION DETAILED: EPIGASTRIC SKIN
LOCATION DETAILED: LEFT DISTAL RADIAL DORSAL FOREARM
LOCATION DETAILED: RIGHT FOREHEAD
LOCATION DETAILED: LEFT SUPERIOR FOREHEAD
LOCATION DETAILED: LEFT MEDIAL FOREHEAD
LOCATION DETAILED: RIGHT PROXIMAL RADIAL DORSAL FOREARM
LOCATION DETAILED: LEFT DISTAL PRETIBIAL REGION
LOCATION DETAILED: LEFT SUPERIOR MEDIAL UPPER BACK
LOCATION DETAILED: RIGHT DISTAL PRETIBIAL REGION
LOCATION DETAILED: LEFT ULNAR DORSAL HAND
LOCATION DETAILED: RIGHT SUPERIOR MEDIAL MIDBACK
LOCATION DETAILED: RIGHT LATERAL ELBOW
LOCATION DETAILED: RIGHT DISTAL DORSAL FOREARM
LOCATION DETAILED: RIGHT PROXIMAL DORSAL FOREARM
LOCATION DETAILED: RIGHT ULNAR DORSAL HAND

## 2019-02-20 ASSESSMENT — LOCATION SIMPLE DESCRIPTION DERM
LOCATION SIMPLE: RIGHT HAND
LOCATION SIMPLE: RIGHT PRETIBIAL REGION
LOCATION SIMPLE: LEFT FOREARM
LOCATION SIMPLE: RIGHT LOWER BACK
LOCATION SIMPLE: LEFT THIGH
LOCATION SIMPLE: RIGHT FOREHEAD
LOCATION SIMPLE: RIGHT FOREARM
LOCATION SIMPLE: RIGHT ELBOW
LOCATION SIMPLE: LEFT PRETIBIAL REGION
LOCATION SIMPLE: LEFT HAND
LOCATION SIMPLE: LEFT UPPER BACK
LOCATION SIMPLE: LEFT FOREHEAD
LOCATION SIMPLE: ABDOMEN

## 2019-02-20 ASSESSMENT — LOCATION ZONE DERM
LOCATION ZONE: ARM
LOCATION ZONE: LEG
LOCATION ZONE: FACE
LOCATION ZONE: HAND
LOCATION ZONE: TRUNK

## 2019-02-20 NOTE — PROCEDURE: BIOPSY BY SHAVE METHOD
Dressing: bandage
Hemostasis: Drysol
Silver Nitrate Text: The wound bed was treated with silver nitrate after the biopsy was performed.
Notification Instructions: Patient will be notified of biopsy results. However, patient instructed to call the office if not contacted within 2 weeks.
Was A Bandage Applied: Yes
Biopsy Method: Personna blade
Detail Level: Detailed
Curettage Text: The wound bed was treated with curettage after the biopsy was performed.
Bill For Surgical Tray: no
Lab: [id number]
Consent: Written consent was obtained and risks were reviewed including but not limited to scarring, infection, bleeding, scabbing, incomplete removal, nerve damage and allergy to anesthesia.
Size Of Lesion In Cm: 0
Anesthesia Type: 1% lidocaine with 1:100,000 epinephrine and a 1:3 solution of 8.4% sodium bicarbonate
Lab Facility: 
Cryotherapy Text: The wound bed was treated with cryotherapy after the biopsy was performed.
Electrodesiccation Text: The wound bed was treated with electrodesiccation after the biopsy was performed.
Wound Care: Vaseline
Biopsy Type: H and E
Depth Of Biopsy: dermis
Billing Type: Third-Party Bill
Electrodesiccation And Curettage Text: The wound bed was treated with electrodesiccation and curettage after the biopsy was performed.
Anesthesia Volume In Cc: 1
Type Of Destruction Used: Curettage
Post-Care Instructions: I reviewed with the patient in detail post-care instructions. Patient is to keep the biopsy site dry overnight, and then apply bacitracin twice daily until healed. Patient may apply hydrogen peroxide soaks to remove any crusting.

## 2019-02-20 NOTE — PROCEDURE: LIQUID NITROGEN
Render Post-Care Instructions In Note?: no
Number Of Freeze-Thaw Cycles: 2 freeze-thaw cycles
Duration Of Freeze Thaw-Cycle (Seconds): 3
Post-Care Instructions: I reviewed with the patient in detail post-care instructions. Patient is to wear sunprotection, and avoid picking at any of the treated lesions. Pt may apply Vaseline to crusted or scabbing areas.
Detail Level: Detailed
Consent: The patient's consent was obtained including but not limited to risks of crusting, scabbing, blistering, scarring, darker or lighter pigmentary change, recurrence, incomplete removal and infection.

## 2019-02-20 NOTE — PROCEDURE: CRYOSURGICAL DESTRUCTION
Medication Injected: 5-Fluorouracil
Add Intralesional Injection: No
Consent was obtained from the patient. The risks and benefits to therapy were discussed in detail. Specifically, the risks of infection, scarring, bleeding, prolonged wound healing, incomplete removal, allergy to anesthesia, nerve injury and recurrence were addressed. Alternatives to liquid nitrogen, such as ED&C, surgical removal, XRT were also discussed.  Prior to the procedure, the treatment site was clearly identified and confirmed by the patient. All components of Universal Protocol/PAUSE Rule completed.
Total Volume (Ccs): 1
Bill As A Line Item Or As Units: Line Item
Post-Care Instructions: I reviewed with the patient in detail post-care instructions. Patient is to keep the area dry for 48 hours, and not to engage in any heavy lifting, exercise, or swimming for the next 14 days. Should the patient develop any fevers, chills, bleeding, severe pain patient will contact the office immediately.
Number Of Freeze-Thaw Cycles: 3 freeze-thaw cycles
Pre-Procedure: The surgical site was antiseptically prepared.
Anesthesia Volume In Cc: 0
Detail Level: Detailed
Additional Information: (Optional): The wound was cleaned, and a dressing was applied.  The patient received detailed post-op instructions.
Total Time In Minutes: 3 minutes

## 2019-02-27 RX ORDER — LEVOTHYROXINE SODIUM 0.12 MG/1
TABLET ORAL
Qty: 90 TAB | Refills: 3 | Status: SHIPPED | OUTPATIENT
Start: 2019-02-27 | End: 2020-02-19 | Stop reason: SDUPTHER

## 2019-03-08 RX ORDER — ALBUTEROL SULFATE 2.5 MG/3ML
SOLUTION RESPIRATORY (INHALATION)
Qty: 300 BULLET | Refills: 6 | Status: SHIPPED | OUTPATIENT
Start: 2019-03-08 | End: 2020-05-08

## 2019-04-11 ENCOUNTER — OFFICE VISIT (OUTPATIENT)
Dept: NEUROLOGY | Facility: MEDICAL CENTER | Age: 79
End: 2019-04-11
Payer: MEDICARE

## 2019-04-11 VITALS
TEMPERATURE: 98.1 F | HEIGHT: 65 IN | HEART RATE: 65 BPM | DIASTOLIC BLOOD PRESSURE: 76 MMHG | BODY MASS INDEX: 28.92 KG/M2 | WEIGHT: 173.6 LBS | SYSTOLIC BLOOD PRESSURE: 144 MMHG | OXYGEN SATURATION: 92 %

## 2019-04-11 DIAGNOSIS — G40.909 SEIZURE DISORDER (HCC): ICD-10-CM

## 2019-04-11 DIAGNOSIS — G05.3 AUTOIMMUNE CEREBRITIS (HCC): ICD-10-CM

## 2019-04-11 DIAGNOSIS — F03.90 DEMENTIA WITHOUT BEHAVIORAL DISTURBANCE, UNSPECIFIED DEMENTIA TYPE: ICD-10-CM

## 2019-04-11 DIAGNOSIS — R79.1 ABNORMAL COAGULATION PROFILE: ICD-10-CM

## 2019-04-11 DIAGNOSIS — E53.8 VITAMIN B12 DEFICIENCY: ICD-10-CM

## 2019-04-11 DIAGNOSIS — F02.80 LATE ONSET ALZHEIMER'S DISEASE WITHOUT BEHAVIORAL DISTURBANCE (HCC): ICD-10-CM

## 2019-04-11 DIAGNOSIS — R76.8 ANTI-TPO ANTIBODIES PRESENT: ICD-10-CM

## 2019-04-11 DIAGNOSIS — G30.1 LATE ONSET ALZHEIMER'S DISEASE WITHOUT BEHAVIORAL DISTURBANCE (HCC): ICD-10-CM

## 2019-04-11 DIAGNOSIS — M35.9 AUTOIMMUNE CEREBRITIS (HCC): ICD-10-CM

## 2019-04-11 PROBLEM — M31.6 TEMPORAL ARTERITIS (HCC): Status: RESOLVED | Noted: 2018-11-05 | Resolved: 2019-04-11

## 2019-04-11 PROCEDURE — 99214 OFFICE O/P EST MOD 30 MIN: CPT | Performed by: PSYCHIATRY & NEUROLOGY

## 2019-04-11 RX ORDER — MEMANTINE HYDROCHLORIDE 5 MG/1
5 TABLET ORAL 2 TIMES DAILY
Qty: 180 TAB | Refills: 2 | Status: SHIPPED | OUTPATIENT
Start: 2019-04-11 | End: 2020-05-27

## 2019-04-11 RX ORDER — LEVETIRACETAM 250 MG/1
250 TABLET ORAL 2 TIMES DAILY
Qty: 180 TAB | Refills: 2 | Status: SHIPPED | OUTPATIENT
Start: 2019-04-11 | End: 2020-07-24

## 2019-04-11 NOTE — PATIENT INSTRUCTIONS
Recall 4/4 Registration 3/4 today  Could not tolerate Keppra 375 two times     IMPRESSION:    1. Memory problem for years-- her former neurologist was in [facility]  2. Hx of Vit B12 deficiency ( diagnosed in [place]), daily Headache since age of 73, Dizziness, Lack of energy--- ( left temporal artery biopsy was negative of temporal arterities)  3. Hx of hypothyroidism, COPD, HTN. Depression, Hearing impairment    PLAN/RECOMMENDATIONS:      Headache started since the age of 74YO-- secondary headache remains a big concern    Continue Namenda 5mg-- but that made her sleepy-- the patient did not take Namenda faithfully?  ________________________________________________________________________    The dizziness improved somewhat with Keppra 250mg bid but she could not tolerate higher dose of Keppra    This time we offer spinal tap to exclude autoimmune cereberitis--- autoimmune process could cause headache and dementia even in this age group     ________________________________________________________________________    ________________________________________________________________________          ________________________________________________________________________

## 2019-04-17 DIAGNOSIS — M81.0 OSTEOPOROSIS WITHOUT CURRENT PATHOLOGICAL FRACTURE, UNSPECIFIED OSTEOPOROSIS TYPE: ICD-10-CM

## 2019-04-17 DIAGNOSIS — E66.09 NON MORBID OBESITY DUE TO EXCESS CALORIES: ICD-10-CM

## 2019-04-17 RX ORDER — ALENDRONATE SODIUM 70 MG/1
TABLET ORAL
Qty: 12 TAB | Refills: 3 | Status: SHIPPED | OUTPATIENT
Start: 2019-04-17 | End: 2020-03-18

## 2019-04-17 RX ORDER — AMLODIPINE BESYLATE 10 MG/1
TABLET ORAL
Qty: 90 TAB | Refills: 3 | Status: SHIPPED | OUTPATIENT
Start: 2019-04-17 | End: 2019-08-13

## 2019-04-18 ENCOUNTER — TELEPHONE (OUTPATIENT)
Dept: NEUROLOGY | Facility: MEDICAL CENTER | Age: 79
End: 2019-04-18

## 2019-04-18 NOTE — TELEPHONE ENCOUNTER
Called pt son and LVM letting him know he LUMBAR PUNCTURE is ordered but not scheduled -- I advised in VM to call [phone] to schedule that appt and to call me back if he has any further questions or concerns.

## 2019-05-02 ENCOUNTER — PATIENT OUTREACH (OUTPATIENT)
Dept: HEALTH INFORMATION MANAGEMENT | Facility: OTHER | Age: 79
End: 2019-05-02

## 2019-05-03 NOTE — PROGRESS NOTES
Outcome: Left Message    Please transfer to Patient Outreach Team at [phone] when patient returns call.    WebIZ Checked & Epic Updated:  yes    [facility] Verified: yes    Attempt # 1

## 2019-05-06 ENCOUNTER — APPOINTMENT (RX ONLY)
Dept: URBAN - METROPOLITAN AREA CLINIC 22 | Facility: CLINIC | Age: 79
Setting detail: DERMATOLOGY
End: 2019-05-06

## 2019-05-06 DIAGNOSIS — L57.0 ACTINIC KERATOSIS: ICD-10-CM

## 2019-05-06 PROBLEM — C44.612 BASAL CELL CARCINOMA OF SKIN OF RIGHT UPPER LIMB, INCLUDING SHOULDER: Status: ACTIVE | Noted: 2019-05-06

## 2019-05-06 PROCEDURE — 12032 INTMD RPR S/A/T/EXT 2.6-7.5: CPT | Mod: 59

## 2019-05-06 PROCEDURE — ? LIQUID NITROGEN

## 2019-05-06 PROCEDURE — ? EXCISION

## 2019-05-06 PROCEDURE — 11602 EXC TR-EXT MAL+MARG 1.1-2 CM: CPT

## 2019-05-06 PROCEDURE — ? COUNSELING

## 2019-05-06 PROCEDURE — 17003 DESTRUCT PREMALG LES 2-14: CPT

## 2019-05-06 PROCEDURE — 17000 DESTRUCT PREMALG LESION: CPT | Mod: 59

## 2019-05-06 ASSESSMENT — LOCATION ZONE DERM
LOCATION ZONE: HAND
LOCATION ZONE: ARM

## 2019-05-06 ASSESSMENT — LOCATION DETAILED DESCRIPTION DERM
LOCATION DETAILED: LEFT ULNAR DORSAL HAND
LOCATION DETAILED: RIGHT RADIAL DORSAL HAND
LOCATION DETAILED: RIGHT DISTAL DORSAL FOREARM
LOCATION DETAILED: LEFT RADIAL DORSAL HAND
LOCATION DETAILED: RIGHT ULNAR DORSAL HAND
LOCATION DETAILED: LEFT DISTAL DORSAL FOREARM
LOCATION DETAILED: LEFT PROXIMAL DORSAL FOREARM

## 2019-05-06 ASSESSMENT — LOCATION SIMPLE DESCRIPTION DERM
LOCATION SIMPLE: LEFT FOREARM
LOCATION SIMPLE: RIGHT FOREARM
LOCATION SIMPLE: LEFT HAND
LOCATION SIMPLE: RIGHT HAND

## 2019-05-06 NOTE — PROCEDURE: LIQUID NITROGEN
Detail Level: Detailed
Render Post-Care Instructions In Note?: no
Duration Of Freeze Thaw-Cycle (Seconds): 3
Post-Care Instructions: I reviewed with the patient in detail post-care instructions. Patient is to wear sunprotection, and avoid picking at any of the treated lesions. Pt may apply Vaseline to crusted or scabbing areas.
Consent: The patient's consent was obtained including but not limited to risks of crusting, scabbing, blistering, scarring, darker or lighter pigmentary change, recurrence, incomplete removal and infection.
Number Of Freeze-Thaw Cycles: 2 freeze-thaw cycles

## 2019-05-06 NOTE — PROCEDURE: EXCISION
Double Island Pedicle Flap Text: The defect edges were debeveled with a #15 scalpel blade.  Given the location of the defect, shape of the defect and the proximity to free margins a double island pedicle advancement flap was deemed most appropriate.  Using a sterile surgical marker, an appropriate advancement flap was drawn incorporating the defect, outlining the appropriate donor tissue and placing the expected incisions within the relaxed skin tension lines where possible.    The area thus outlined was incised deep to adipose tissue with a #15 scalpel blade.  The skin margins were undermined to an appropriate distance in all directions around the primary defect and laterally outward around the island pedicle utilizing iris scissors.  There was minimal undermining beneath the pedicle flap.
Show Repair Surgeon Variable: Yes
Secondary Defect Length (In Cm): 0
M-Plasty Complex Repair Preamble Text (Leave Blank If You Do Not Want): Extensive wide undermining was performed.
Split-Thickness Skin Graft Text: The defect edges were debeveled with a #15 scalpel blade.  Given the location of the defect, shape of the defect and the proximity to free margins a split thickness skin graft was deemed most appropriate.  Using a sterile surgical marker, the primary defect shape was transferred to the donor site. The split thickness graft was then harvested.  The skin graft was then placed in the primary defect and oriented appropriately.
Crescentic Advancement Flap Text: The defect edges were debeveled with a #15 scalpel blade.  Given the location of the defect and the proximity to free margins a crescentic advancement flap was deemed most appropriate.  Using a sterile surgical marker, the appropriate advancement flap was drawn incorporating the defect and placing the expected incisions within the relaxed skin tension lines where possible.    The area thus outlined was incised deep to adipose tissue with a #15 scalpel blade.  The skin margins were undermined to an appropriate distance in all directions utilizing iris scissors.
Star Wedge Flap Text: The defect edges were debeveled with a #15 scalpel blade.  Given the location of the defect, shape of the defect and the proximity to free margins a star wedge flap was deemed most appropriate.  Using a sterile surgical marker, an appropriate rotation flap was drawn incorporating the defect and placing the expected incisions within the relaxed skin tension lines where possible. The area thus outlined was incised deep to adipose tissue with a #15 scalpel blade.  The skin margins were undermined to an appropriate distance in all directions utilizing iris scissors.
Complex Repair And Melolabial Flap Text: The defect edges were debeveled with a #15 scalpel blade.  The primary defect was closed partially with a complex linear closure.  Given the location of the remaining defect, shape of the defect and the proximity to free margins a melolabial flap was deemed most appropriate for complete closure of the defect.  Using a sterile surgical marker, an appropriate advancement flap was drawn incorporating the defect and placing the expected incisions within the relaxed skin tension lines where possible.    The area thus outlined was incised deep to adipose tissue with a #15 scalpel blade.  The skin margins were undermined to an appropriate distance in all directions utilizing iris scissors.
Island Pedicle Flap-Requiring Vessel Identification Text: The defect edges were debeveled with a #15 scalpel blade.  Given the location of the defect, shape of the defect and the proximity to free margins an island pedicle advancement flap was deemed most appropriate.  Using a sterile surgical marker, an appropriate advancement flap was drawn, based on the axial vessel mentioned above, incorporating the defect, outlining the appropriate donor tissue and placing the expected incisions within the relaxed skin tension lines where possible.    The area thus outlined was incised deep to adipose tissue with a #15 scalpel blade.  The skin margins were undermined to an appropriate distance in all directions around the primary defect and laterally outward around the island pedicle utilizing iris scissors.  There was minimal undermining beneath the pedicle flap.
Validate Date Of Previous Biopsy (Can Hide Date Of Previous Biopsy In Settings Tab): No
Consent was obtained from the patient. The risks and benefits to therapy were discussed in detail. Specifically, the risks of infection, scarring, bleeding, prolonged wound healing, incomplete removal, allergy to anesthesia, nerve injury and recurrence were addressed. Prior to the procedure, the treatment site was clearly identified and confirmed by the patient. All components of Universal Protocol/PAUSE Rule completed.
A-T Advancement Flap Text: The defect edges were debeveled with a #15 scalpel blade.  Given the location of the defect, shape of the defect and the proximity to free margins an A-T advancement flap was deemed most appropriate.  Using a sterile surgical marker, an appropriate advancement flap was drawn incorporating the defect and placing the expected incisions within the relaxed skin tension lines where possible.    The area thus outlined was incised deep to adipose tissue with a #15 scalpel blade.  The skin margins were undermined to an appropriate distance in all directions utilizing iris scissors.
Cartilage Graft Text: The defect edges were debeveled with a #15 scalpel blade.  Given the location of the defect, shape of the defect, the fact the defect involved a full thickness cartilage defect a cartilage graft was deemed most appropriate.  An appropriate donor site was identified, cleansed, and anesthetized. The cartilage graft was then harvested and transferred to the recipient site, oriented appropriately and then sutured into place.  The secondary defect was then repaired using a primary closure.
Transposition Flap Text: The defect edges were debeveled with a #15 scalpel blade.  Given the location of the defect and the proximity to free margins a transposition flap was deemed most appropriate.  Using a sterile surgical marker, an appropriate transposition flap was drawn incorporating the defect.    The area thus outlined was incised deep to adipose tissue with a #15 scalpel blade.  The skin margins were undermined to an appropriate distance in all directions utilizing iris scissors.
Complex Repair And Rotation Flap Text: The defect edges were debeveled with a #15 scalpel blade.  The primary defect was closed partially with a complex linear closure.  Given the location of the remaining defect, shape of the defect and the proximity to free margins a rotation flap was deemed most appropriate for complete closure of the defect.  Using a sterile surgical marker, an appropriate advancement flap was drawn incorporating the defect and placing the expected incisions within the relaxed skin tension lines where possible.    The area thus outlined was incised deep to adipose tissue with a #15 scalpel blade.  The skin margins were undermined to an appropriate distance in all directions utilizing iris scissors.
Keystone Flap Text: The defect edges were debeveled with a #15 scalpel blade.  Given the location of the defect, shape of the defect a keystone flap was deemed most appropriate.  Using a sterile surgical marker, an appropriate keystone flap was drawn incorporating the defect, outlining the appropriate donor tissue and placing the expected incisions within the relaxed skin tension lines where possible. The area thus outlined was incised deep to adipose tissue with a #15 scalpel blade.  The skin margins were undermined to an appropriate distance in all directions around the primary defect and laterally outward around the flap utilizing iris scissors.
Size Of Lesion In Cm: 0.8
Composite Graft Text: The defect edges were debeveled with a #15 scalpel blade.  Given the location of the defect, shape of the defect, the proximity to free margins and the fact the defect was full thickness a composite graft was deemed most appropriate.  The defect was outline and then transferred to the donor site.  A full thickness graft was then excised from the donor site. The graft was then placed in the primary defect, oriented appropriately and then sutured into place.  The secondary defect was then repaired using a primary closure.
O-T Advancement Flap Text: The defect edges were debeveled with a #15 scalpel blade.  Given the location of the defect, shape of the defect and the proximity to free margins an O-T advancement flap was deemed most appropriate.  Using a sterile surgical marker, an appropriate advancement flap was drawn incorporating the defect and placing the expected incisions within the relaxed skin tension lines where possible.    The area thus outlined was incised deep to adipose tissue with a #15 scalpel blade.  The skin margins were undermined to an appropriate distance in all directions utilizing iris scissors.
Anesthesia Type: 1% lidocaine with epinephrine and a 1:10 solution of 8.4% sodium bicarbonate
Muscle Hinge Flap Text: The defect edges were debeveled with a #15 scalpel blade.  Given the size, depth and location of the defect and the proximity to free margins a muscle hinge flap was deemed most appropriate.  Using a sterile surgical marker, an appropriate hinge flap was drawn incorporating the defect. The area thus outlined was incised with a #15 scalpel blade.  The skin margins were undermined to an appropriate distance in all directions utilizing iris scissors.
Complex Repair And Rhombic Flap Text: The defect edges were debeveled with a #15 scalpel blade.  The primary defect was closed partially with a complex linear closure.  Given the location of the remaining defect, shape of the defect and the proximity to free margins a rhombic flap was deemed most appropriate for complete closure of the defect.  Using a sterile surgical marker, an appropriate advancement flap was drawn incorporating the defect and placing the expected incisions within the relaxed skin tension lines where possible.    The area thus outlined was incised deep to adipose tissue with a #15 scalpel blade.  The skin margins were undermined to an appropriate distance in all directions utilizing iris scissors.
Epidermal Closure: simple interrupted
O-T Plasty Text: The defect edges were debeveled with a #15 scalpel blade.  Given the location of the defect, shape of the defect and the proximity to free margins an O-T plasty was deemed most appropriate.  Using a sterile surgical marker, an appropriate O-T plasty was drawn incorporating the defect and placing the expected incisions within the relaxed skin tension lines where possible.    The area thus outlined was incised deep to adipose tissue with a #15 scalpel blade.  The skin margins were undermined to an appropriate distance in all directions utilizing iris scissors.
Crescentic Intermediate Repair Preamble Text (Leave Blank If You Do Not Want): Undermining was performed with blunt dissection.
Scalpel Size: 15C blade
O-L Flap Text: The defect edges were debeveled with a #15 scalpel blade.  Given the location of the defect, shape of the defect and the proximity to free margins an O-L flap was deemed most appropriate.  Using a sterile surgical marker, an appropriate advancement flap was drawn incorporating the defect and placing the expected incisions within the relaxed skin tension lines where possible.    The area thus outlined was incised deep to adipose tissue with a #15 scalpel blade.  The skin margins were undermined to an appropriate distance in all directions utilizing iris scissors.
Epidermal Autograft Text: The defect edges were debeveled with a #15 scalpel blade.  Given the location of the defect, shape of the defect and the proximity to free margins an epidermal autograft was deemed most appropriate.  Using a sterile surgical marker, the primary defect shape was transferred to the donor site. The epidermal graft was then harvested.  The skin graft was then placed in the primary defect and oriented appropriately.
Suture Removal: 14 days
Complex Repair And Transposition Flap Text: The defect edges were debeveled with a #15 scalpel blade.  The primary defect was closed partially with a complex linear closure.  Given the location of the remaining defect, shape of the defect and the proximity to free margins a transposition flap was deemed most appropriate for complete closure of the defect.  Using a sterile surgical marker, an appropriate advancement flap was drawn incorporating the defect and placing the expected incisions within the relaxed skin tension lines where possible.    The area thus outlined was incised deep to adipose tissue with a #15 scalpel blade.  The skin margins were undermined to an appropriate distance in all directions utilizing iris scissors.
Melolabial Transposition Flap Text: The defect edges were debeveled with a #15 scalpel blade.  Given the location of the defect and the proximity to free margins a melolabial flap was deemed most appropriate.  Using a sterile surgical marker, an appropriate melolabial transposition flap was drawn incorporating the defect.    The area thus outlined was incised deep to adipose tissue with a #15 scalpel blade.  The skin margins were undermined to an appropriate distance in all directions utilizing iris scissors.
O-Z Plasty Text: The defect edges were debeveled with a #15 scalpel blade.  Given the location of the defect, shape of the defect and the proximity to free margins an O-Z plasty (double transposition flap) was deemed most appropriate.  Using a sterile surgical marker, the appropriate transposition flaps were drawn incorporating the defect and placing the expected incisions within the relaxed skin tension lines where possible.    The area thus outlined was incised deep to adipose tissue with a #15 scalpel blade.  The skin margins were undermined to an appropriate distance in all directions utilizing iris scissors.  Hemostasis was achieved with electrocautery.  The flaps were then transposed into place, one clockwise and the other counterclockwise, and anchored with interrupted buried subcutaneous sutures.
Post-Care Instructions: I reviewed with the patient in detail post-care instructions. Patient is not to engage in any heavy lifting, exercise, or swimming for the next 14 days. Should the patient develop any fevers, chills, bleeding, severe pain patient will contact the office immediately.
Dermal Autograft Text: The defect edges were debeveled with a #15 scalpel blade.  Given the location of the defect, shape of the defect and the proximity to free margins a dermal autograft was deemed most appropriate.  Using a sterile surgical marker, the primary defect shape was transferred to the donor site. The area thus outlined was incised deep to adipose tissue with a #15 scalpel blade.  The harvested graft was then trimmed of adipose and epidermal tissue until only dermis was left.  The skin graft was then placed in the primary defect and oriented appropriately.
Complex Repair And V-Y Plasty Text: The defect edges were debeveled with a #15 scalpel blade.  The primary defect was closed partially with a complex linear closure.  Given the location of the remaining defect, shape of the defect and the proximity to free margins a V-Y plasty was deemed most appropriate for complete closure of the defect.  Using a sterile surgical marker, an appropriate advancement flap was drawn incorporating the defect and placing the expected incisions within the relaxed skin tension lines where possible.    The area thus outlined was incised deep to adipose tissue with a #15 scalpel blade.  The skin margins were undermined to an appropriate distance in all directions utilizing iris scissors.
Anesthesia Volume In Cc: 4.5
O-Z Flap Text: The defect edges were debeveled with a #15 scalpel blade.  Given the location of the defect, shape of the defect and the proximity to free margins an O-Z flap was deemed most appropriate.  Using a sterile surgical marker, an appropriate transposition flap was drawn incorporating the defect and placing the expected incisions within the relaxed skin tension lines where possible. The area thus outlined was incised deep to adipose tissue with a #15 scalpel blade.  The skin margins were undermined to an appropriate distance in all directions utilizing iris scissors.
Size Of Margin In Cm: 0.2
Rhombic Flap Text: The defect edges were debeveled with a #15 scalpel blade.  Given the location of the defect and the proximity to free margins a rhombic flap was deemed most appropriate.  Using a sterile surgical marker, an appropriate rhombic flap was drawn incorporating the defect.    The area thus outlined was incised deep to adipose tissue with a #15 scalpel blade.  The skin margins were undermined to an appropriate distance in all directions utilizing iris scissors.
Double O-Z Plasty Text: The defect edges were debeveled with a #15 scalpel blade.  Given the location of the defect, shape of the defect and the proximity to free margins a Double O-Z plasty (double transposition flap) was deemed most appropriate.  Using a sterile surgical marker, the appropriate transposition flaps were drawn incorporating the defect and placing the expected incisions within the relaxed skin tension lines where possible. The area thus outlined was incised deep to adipose tissue with a #15 scalpel blade.  The skin margins were undermined to an appropriate distance in all directions utilizing iris scissors.  Hemostasis was achieved with electrocautery.  The flaps were then transposed into place, one clockwise and the other counterclockwise, and anchored with interrupted buried subcutaneous sutures.
Excision Depth: adipose tissue
Graft Donor Site Bandage (Optional-Leave Blank If You Don't Want In Note): Steri-strips and a pressure bandage were applied to the donor site.
Skin Substitute Text: The defect edges were debeveled with a #15 scalpel blade.  Given the location of the defect, shape of the defect and the proximity to free margins a skin substitute graft was deemed most appropriate.  The graft material was trimmed to fit the size of the defect. The graft was then placed in the primary defect and oriented appropriately.
Double O-Z Flap Text: The defect edges were debeveled with a #15 scalpel blade.  Given the location of the defect, shape of the defect and the proximity to free margins a Double O-Z flap was deemed most appropriate.  Using a sterile surgical marker, an appropriate transposition flap was drawn incorporating the defect and placing the expected incisions within the relaxed skin tension lines where possible. The area thus outlined was incised deep to adipose tissue with a #15 scalpel blade.  The skin margins were undermined to an appropriate distance in all directions utilizing iris scissors.
Rhomboid Transposition Flap Text: The defect edges were debeveled with a #15 scalpel blade.  Given the location of the defect and the proximity to free margins a rhomboid transposition flap was deemed most appropriate.  Using a sterile surgical marker, an appropriate rhomboid flap was drawn incorporating the defect.    The area thus outlined was incised deep to adipose tissue with a #15 scalpel blade.  The skin margins were undermined to an appropriate distance in all directions utilizing iris scissors.
Home Suture Removal Text: Patient was provided a home suture removal kit and will remove their sutures at home.  If they have any questions or difficulties they will call the office.
Complex Repair And M Plasty Text: The defect edges were debeveled with a #15 scalpel blade.  The primary defect was closed partially with a complex linear closure.  Given the location of the remaining defect, shape of the defect and the proximity to free margins an M plasty was deemed most appropriate for complete closure of the defect.  Using a sterile surgical marker, an appropriate advancement flap was drawn incorporating the defect and placing the expected incisions within the relaxed skin tension lines where possible.    The area thus outlined was incised deep to adipose tissue with a #15 scalpel blade.  The skin margins were undermined to an appropriate distance in all directions utilizing iris scissors.
Excision Method: Elliptical
S Plasty Text: Given the location and shape of the defect, and the orientation of relaxed skin tension lines, an S-plasty was deemed most appropriate for repair.  Using a sterile surgical marker, the appropriate outline of the S-plasty was drawn, incorporating the defect and placing the expected incisions within the relaxed skin tension lines where possible.  The area thus outlined was incised deep to adipose tissue with a #15 scalpel blade.  The skin margins were undermined to an appropriate distance in all directions utilizing iris scissors. The skin flaps were advanced over the defect.  The opposing margins were then approximated with interrupted buried subcutaneous sutures.
Undermining Location (Optional): in the superficial subcutaneous fat
Lab Facility: 
V-Y Flap Text: The defect edges were debeveled with a #15 scalpel blade.  Given the location of the defect, shape of the defect and the proximity to free margins a V-Y flap was deemed most appropriate.  Using a sterile surgical marker, an appropriate advancement flap was drawn incorporating the defect and placing the expected incisions within the relaxed skin tension lines where possible.    The area thus outlined was incised deep to adipose tissue with a #15 scalpel blade.  The skin margins were undermined to an appropriate distance in all directions utilizing iris scissors.
Tissue Cultured Epidermal Autograft Text: The defect edges were debeveled with a #15 scalpel blade.  Given the location of the defect, shape of the defect and the proximity to free margins a tissue cultured epidermal autograft was deemed most appropriate.  The graft was then trimmed to fit the size of the defect.  The graft was then placed in the primary defect and oriented appropriately.
Wound Care: Vaseline
Bi-Rhombic Flap Text: The defect edges were debeveled with a #15 scalpel blade.  Given the location of the defect and the proximity to free margins a bi-rhombic flap was deemed most appropriate.  Using a sterile surgical marker, an appropriate rhombic flap was drawn incorporating the defect. The area thus outlined was incised deep to adipose tissue with a #15 scalpel blade.  The skin margins were undermined to an appropriate distance in all directions utilizing iris scissors.
Complex Repair And Double M Plasty Text: The defect edges were debeveled with a #15 scalpel blade.  The primary defect was closed partially with a complex linear closure.  Given the location of the remaining defect, shape of the defect and the proximity to free margins a double M plasty was deemed most appropriate for complete closure of the defect.  Using a sterile surgical marker, an appropriate advancement flap was drawn incorporating the defect and placing the expected incisions within the relaxed skin tension lines where possible.    The area thus outlined was incised deep to adipose tissue with a #15 scalpel blade.  The skin margins were undermined to an appropriate distance in all directions utilizing iris scissors.
Curvilinear Excision Additional Text (Leave Blank If You Do Not Want): The margin was drawn around the clinically apparent lesion.  A curvilinear shape was then drawn on the skin incorporating the lesion and margins.  Incisions were then made along these lines to the appropriate tissue plane and the lesion was extirpated.
Additional Anesthesia Volume In Cc: 3
V-Y Plasty Text: The defect edges were debeveled with a #15 scalpel blade.  Given the location of the defect, shape of the defect and the proximity to free margins an V-Y advancement flap was deemed most appropriate.  Using a sterile surgical marker, an appropriate advancement flap was drawn incorporating the defect and placing the expected incisions within the relaxed skin tension lines where possible.    The area thus outlined was incised deep to adipose tissue with a #15 scalpel blade.  The skin margins were undermined to an appropriate distance in all directions utilizing iris scissors.
Lab: [id number]
Xenograft Text: The defect edges were debeveled with a #15 scalpel blade.  Given the location of the defect, shape of the defect and the proximity to free margins a xenograft was deemed most appropriate.  The graft was then trimmed to fit the size of the defect.  The graft was then placed in the primary defect and oriented appropriately.
Complex Repair And W Plasty Text: The defect edges were debeveled with a #15 scalpel blade.  The primary defect was closed partially with a complex linear closure.  Given the location of the remaining defect, shape of the defect and the proximity to free margins a W plasty was deemed most appropriate for complete closure of the defect.  Using a sterile surgical marker, an appropriate advancement flap was drawn incorporating the defect and placing the expected incisions within the relaxed skin tension lines where possible.    The area thus outlined was incised deep to adipose tissue with a #15 scalpel blade.  The skin margins were undermined to an appropriate distance in all directions utilizing iris scissors.
Helical Rim Advancement Flap Text: The defect edges were debeveled with a #15 blade scalpel.  Given the location of the defect and the proximity to free margins (helical rim) a double helical rim advancement flap was deemed most appropriate.  Using a sterile surgical marker, the appropriate advancement flaps were drawn incorporating the defect and placing the expected incisions between the helical rim and antihelix where possible.  The area thus outlined was incised through and through with a #15 scalpel blade.  With a skin hook and iris scissors, the flaps were gently and sharply undermined and freed up.
H Plasty Text: Given the location of the defect, shape of the defect and the proximity to free margins a H-plasty was deemed most appropriate for repair.  Using a sterile surgical marker, the appropriate advancement arms of the H-plasty were drawn incorporating the defect and placing the expected incisions within the relaxed skin tension lines where possible. The area thus outlined was incised deep to adipose tissue with a #15 scalpel blade. The skin margins were undermined to an appropriate distance in all directions utilizing iris scissors.  The opposing advancement arms were then advanced into place in opposite direction and anchored with interrupted buried subcutaneous sutures.
Fusiform Excision Additional Text (Leave Blank If You Do Not Want): The margin was drawn around the clinically apparent lesion.  A fusiform shape was then drawn on the skin incorporating the lesion and margins.  Incisions were then made along these lines to the appropriate tissue plane and the lesion was extirpated.
Deep Sutures: 4-0 PGA
Advancement-Rotation Flap Text: The defect edges were debeveled with a #15 scalpel blade.  Given the location of the defect, shape of the defect and the proximity to free margins an advancement-rotation flap was deemed most appropriate.  Using a sterile surgical marker, an appropriate flap was drawn incorporating the defect and placing the expected incisions within the relaxed skin tension lines where possible. The area thus outlined was incised deep to adipose tissue with a #15 scalpel blade.  The skin margins were undermined to an appropriate distance in all directions utilizing iris scissors.
Purse String (Intermediate) Text: Given the location of the defect and the characteristics of the surrounding skin a purse string intermediate closure was deemed most appropriate.  Undermining was performed circumfirentially around the surgical defect.  A purse string suture was then placed and tightened.
Bilateral Helical Rim Advancement Flap Text: The defect edges were debeveled with a #15 blade scalpel.  Given the location of the defect and the proximity to free margins (helical rim) a bilateral helical rim advancement flap was deemed most appropriate.  Using a sterile surgical marker, the appropriate advancement flaps were drawn incorporating the defect and placing the expected incisions between the helical rim and antihelix where possible.  The area thus outlined was incised through and through with a #15 scalpel blade.  With a skin hook and iris scissors, the flaps were gently and sharply undermined and freed up.
Complex Repair And Z Plasty Text: The defect edges were debeveled with a #15 scalpel blade.  The primary defect was closed partially with a complex linear closure.  Given the location of the remaining defect, shape of the defect and the proximity to free margins a Z plasty was deemed most appropriate for complete closure of the defect.  Using a sterile surgical marker, an appropriate advancement flap was drawn incorporating the defect and placing the expected incisions within the relaxed skin tension lines where possible.    The area thus outlined was incised deep to adipose tissue with a #15 scalpel blade.  The skin margins were undermined to an appropriate distance in all directions utilizing iris scissors.
Repair Type: Intermediate
Dressing: dry sterile dressing
W Plasty Text: The lesion was extirpated to the level of the fat with a #15 scalpel blade.  Given the location of the defect, shape of the defect and the proximity to free margins a W-plasty was deemed most appropriate for repair.  Using a sterile surgical marker, the appropriate transposition arms of the W-plasty were drawn incorporating the defect and placing the expected incisions within the relaxed skin tension lines where possible.    The area thus outlined was incised deep to adipose tissue with a #15 scalpel blade.  The skin margins were undermined to an appropriate distance in all directions utilizing iris scissors.  The opposing transposition arms were then transposed into place in opposite direction and anchored with interrupted buried subcutaneous sutures.
Elliptical Excision Additional Text (Leave Blank If You Do Not Want): The margin was drawn around the clinically apparent lesion.  An elliptical shape was then drawn on the skin incorporating the lesion and margins.  Incisions were then made along these lines to the appropriate tissue plane and the lesion was extirpated.
Mercedes Flap Text: The defect edges were debeveled with a #15 scalpel blade.  Given the location of the defect, shape of the defect and the proximity to free margins a Mercedes flap was deemed most appropriate.  Using a sterile surgical marker, an appropriate advancement flap was drawn incorporating the defect and placing the expected incisions within the relaxed skin tension lines where possible. The area thus outlined was incised deep to adipose tissue with a #15 scalpel blade.  The skin margins were undermined to an appropriate distance in all directions utilizing iris scissors.
Purse String (Simple) Text: Given the location of the defect and the characteristics of the surrounding skin a purse string simple closure was deemed most appropriate.  Undermining was performed circumferentially around the surgical defect.  A purse string suture was then placed and tightened.
Ear Star Wedge Flap Text: The defect edges were debeveled with a #15 blade scalpel.  Given the location of the defect and the proximity to free margins (helical rim) an ear star wedge flap was deemed most appropriate.  Using a sterile surgical marker, the appropriate flap was drawn incorporating the defect and placing the expected incisions between the helical rim and antihelix where possible.  The area thus outlined was incised through and through with a #15 scalpel blade.
Complex Repair And Dorsal Nasal Flap Text: The defect edges were debeveled with a #15 scalpel blade.  The primary defect was closed partially with a complex linear closure.  Given the location of the remaining defect, shape of the defect and the proximity to free margins a dorsal nasal flap was deemed most appropriate for complete closure of the defect.  Using a sterile surgical marker, an appropriate flap was drawn incorporating the defect and placing the expected incisions within the relaxed skin tension lines where possible.    The area thus outlined was incised deep to adipose tissue with a #15 scalpel blade.  The skin margins were undermined to an appropriate distance in all directions utilizing iris scissors.
Saucerization Excision Additional Text (Leave Blank If You Do Not Want): The margin was drawn around the clinically apparent lesion.  Incisions were then made along these lines, in a tangential fashion, to the appropriate tissue plane and the lesion was extirpated.
Hemostasis: Electrocautery
Z Plasty Text: The lesion was extirpated to the level of the fat with a #15 scalpel blade.  Given the location of the defect, shape of the defect and the proximity to free margins a Z-plasty was deemed most appropriate for repair.  Using a sterile surgical marker, the appropriate transposition arms of the Z-plasty were drawn incorporating the defect and placing the expected incisions within the relaxed skin tension lines where possible.    The area thus outlined was incised deep to adipose tissue with a #15 scalpel blade.  The skin margins were undermined to an appropriate distance in all directions utilizing iris scissors.  The opposing transposition arms were then transposed into place in opposite direction and anchored with interrupted buried subcutaneous sutures.
Modified Advancement Flap Text: The defect edges were debeveled with a #15 scalpel blade.  Given the location of the defect, shape of the defect and the proximity to free margins a modified advancement flap was deemed most appropriate.  Using a sterile surgical marker, an appropriate advancement flap was drawn incorporating the defect and placing the expected incisions within the relaxed skin tension lines where possible.    The area thus outlined was incised deep to adipose tissue with a #15 scalpel blade.  The skin margins were undermined to an appropriate distance in all directions utilizing iris scissors.
Partial Purse String (Intermediate) Text: Given the location of the defect and the characteristics of the surrounding skin an intermediate purse string closure was deemed most appropriate.  Undermining was performed circumferentially around the surgical defect.  A purse string suture was then placed and tightened. Wound tension of the circular defect prevented complete closure of the wound.
Banner Transposition Flap Text: The defect edges were debeveled with a #15 scalpel blade.  Given the location of the defect and the proximity to free margins a Banner transposition flap was deemed most appropriate.  Using a sterile surgical marker, an appropriate flap drawn around the defect. The area thus outlined was incised deep to adipose tissue with a #15 scalpel blade.  The skin margins were undermined to an appropriate distance in all directions utilizing iris scissors.
Complex Repair And Ftsg Text: The defect edges were debeveled with a #15 scalpel blade.  The primary defect was closed partially with a complex linear closure.  Given the location of the defect, shape of the defect and the proximity to free margins a full thickness skin graft was deemed most appropriate to repair the remaining defect.  The graft was trimmed to fit the size of the remaining defect.  The graft was then placed in the primary defect, oriented appropriately, and sutured into place.
Intermediate / Complex Repair - Final Wound Length In Cm: 3.3
Cheek Interpolation Flap Text: A decision was made to reconstruct the defect utilizing an interpolation axial flap and a staged reconstruction.  A telfa template was made of the defect.  This telfa template was then used to outline the Cheek Interpolation flap.  The donor area for the pedicle flap was then injected with anesthesia.  The flap was excised through the skin and subcutaneous tissue down to the layer of the underlying musculature.  The interpolation flap was carefully excised within this deep plane to maintain its blood supply.  The edges of the donor site were undermined.   The donor site was closed in a primary fashion.  The pedicle was then rotated into position and sutured.  Once the tube was sutured into place, adequate blood supply was confirmed with blanching and refill.  The pedicle was then wrapped with xeroform gauze and dressed appropriately with a telfa and gauze bandage to ensure continued blood supply and protect the attached pedicle.
Slit Excision Additional Text (Leave Blank If You Do Not Want): A linear line was drawn on the skin overlying the lesion. An incision was made slowly until the lesion was visualized.  Once visualized, the lesion was removed with blunt dissection.
Mucosal Advancement Flap Text: Given the location of the defect, shape of the defect and the proximity to free margins a mucosal advancement flap was deemed most appropriate. Incisions were made with a 15 blade scalpel in the appropriate fashion along the cutaneous vermilion border and the mucosal lip. The remaining actinically damaged mucosal tissue was excised.  The mucosal advancement flap was then elevated to the gingival sulcus with care taken to preserve the neurovascular structures and advanced into the primary defect. Care was taken to ensure that precise realignment of the vermilion border was achieved.
Billing Type: Third-Party Bill
Complex Repair And Split-Thickness Skin Graft Text: The defect edges were debeveled with a #15 scalpel blade.  The primary defect was closed partially with a complex linear closure.  Given the location of the defect, shape of the defect and the proximity to free margins a split thickness skin graft was deemed most appropriate to repair the remaining defect.  The graft was trimmed to fit the size of the remaining defect.  The graft was then placed in the primary defect, oriented appropriately, and sutured into place.
Bilobed Flap Text: The defect edges were debeveled with a #15 scalpel blade.  Given the location of the defect and the proximity to free margins a bilobe flap was deemed most appropriate.  Using a sterile surgical marker, an appropriate bilobe flap drawn around the defect.    The area thus outlined was incised deep to adipose tissue with a #15 scalpel blade.  The skin margins were undermined to an appropriate distance in all directions utilizing iris scissors.
Estimated Blood Loss (Cc): minimal
Cheek-To-Nose Interpolation Flap Text: A decision was made to reconstruct the defect utilizing an interpolation axial flap and a staged reconstruction.  A telfa template was made of the defect.  This telfa template was then used to outline the Cheek-To-Nose Interpolation flap.  The donor area for the pedicle flap was then injected with anesthesia.  The flap was excised through the skin and subcutaneous tissue down to the layer of the underlying musculature.  The interpolation flap was carefully excised within this deep plane to maintain its blood supply.  The edges of the donor site were undermined.   The donor site was closed in a primary fashion.  The pedicle was then rotated into position and sutured.  Once the tube was sutured into place, adequate blood supply was confirmed with blanching and refill.  The pedicle was then wrapped with xeroform gauze and dressed appropriately with a telfa and gauze bandage to ensure continued blood supply and protect the attached pedicle.
Excisional Biopsy Additional Text (Leave Blank If You Do Not Want): The margin was drawn around the clinically apparent lesion. An elliptical shape was then drawn on the skin incorporating the lesion and margins.  Incisions were then made along these lines to the appropriate tissue plane and the lesion was extirpated.
Hatchet Flap Text: The defect edges were debeveled with a #15 scalpel blade.  Given the location of the defect, shape of the defect and the proximity to free margins a hatchet flap was deemed most appropriate.  Using a sterile surgical marker, an appropriate hatchet flap was drawn incorporating the defect and placing the expected incisions within the relaxed skin tension lines where possible.    The area thus outlined was incised deep to adipose tissue with a #15 scalpel blade.  The skin margins were undermined to an appropriate distance in all directions utilizing iris scissors.
Number Of Deep Sutures (Optional): 2
Partial Purse String (Simple) Text: Given the location of the defect and the characteristics of the surrounding skin a simple purse string closure was deemed most appropriate.  Undermining was performed circumferentially around the surgical defect.  A purse string suture was then placed and tightened. Wound tension of the circular defect prevented complete closure of the wound.
Complex Repair And Epidermal Autograft Text: The defect edges were debeveled with a #15 scalpel blade.  The primary defect was closed partially with a complex linear closure.  Given the location of the defect, shape of the defect and the proximity to free margins an epidermal autograft was deemed most appropriate to repair the remaining defect.  The graft was trimmed to fit the size of the remaining defect.  The graft was then placed in the primary defect, oriented appropriately, and sutured into place.
Bilobed Transposition Flap Text: The defect edges were debeveled with a #15 scalpel blade.  Given the location of the defect and the proximity to free margins a bilobed transposition flap was deemed most appropriate.  Using a sterile surgical marker, an appropriate bilobe flap drawn around the defect.    The area thus outlined was incised deep to adipose tissue with a #15 scalpel blade.  The skin margins were undermined to an appropriate distance in all directions utilizing iris scissors.
Perilesional Excision Additional Text (Leave Blank If You Do Not Want): The margin was drawn around the clinically apparent lesion. Incisions were then made along these lines to the appropriate tissue plane and the lesion was extirpated.
Interpolation Flap Text: A decision was made to reconstruct the defect utilizing an interpolation axial flap and a staged reconstruction.  A telfa template was made of the defect.  This telfa template was then used to outline the interpolation flap.  The donor area for the pedicle flap was then injected with anesthesia.  The flap was excised through the skin and subcutaneous tissue down to the layer of the underlying musculature.  The interpolation flap was carefully excised within this deep plane to maintain its blood supply.  The edges of the donor site were undermined.   The donor site was closed in a primary fashion.  The pedicle was then rotated into position and sutured.  Once the tube was sutured into place, adequate blood supply was confirmed with blanching and refill.  The pedicle was then wrapped with xeroform gauze and dressed appropriately with a telfa and gauze bandage to ensure continued blood supply and protect the attached pedicle.
Rotation Flap Text: The defect edges were debeveled with a #15 scalpel blade.  Given the location of the defect, shape of the defect and the proximity to free margins a rotation flap was deemed most appropriate.  Using a sterile surgical marker, an appropriate rotation flap was drawn incorporating the defect and placing the expected incisions within the relaxed skin tension lines where possible.    The area thus outlined was incised deep to adipose tissue with a #15 scalpel blade.  The skin margins were undermined to an appropriate distance in all directions utilizing iris scissors.
Complex Repair And Single Advancement Flap Text: The defect edges were debeveled with a #15 scalpel blade.  The primary defect was closed partially with a complex linear closure.  Given the location of the remaining defect, shape of the defect and the proximity to free margins a single advancement flap was deemed most appropriate for complete closure of the defect.  Using a sterile surgical marker, an appropriate advancement flap was drawn incorporating the defect and placing the expected incisions within the relaxed skin tension lines where possible.    The area thus outlined was incised deep to adipose tissue with a #15 scalpel blade.  The skin margins were undermined to an appropriate distance in all directions utilizing iris scissors.
Surgeon Performing The Repair (Optional): Conner Schroeder PA-C
Trilobed Flap Text: The defect edges were debeveled with a #15 scalpel blade.  Given the location of the defect and the proximity to free margins a trilobed flap was deemed most appropriate.  Using a sterile surgical marker, an appropriate trilobed flap drawn around the defect.    The area thus outlined was incised deep to adipose tissue with a #15 scalpel blade.  The skin margins were undermined to an appropriate distance in all directions utilizing iris scissors.
Complex Repair And Dermal Autograft Text: The defect edges were debeveled with a #15 scalpel blade.  The primary defect was closed partially with a complex linear closure.  Given the location of the defect, shape of the defect and the proximity to free margins an dermal autograft was deemed most appropriate to repair the remaining defect.  The graft was trimmed to fit the size of the remaining defect.  The graft was then placed in the primary defect, oriented appropriately, and sutured into place.
Positioning (Leave Blank If You Do Not Want): The patient was placed in a comfortable position exposing the surgical site.
Melolabial Interpolation Flap Text: A decision was made to reconstruct the defect utilizing an interpolation axial flap and a staged reconstruction.  A telfa template was made of the defect.  This telfa template was then used to outline the melolabial interpolation flap.  The donor area for the pedicle flap was then injected with anesthesia.  The flap was excised through the skin and subcutaneous tissue down to the layer of the underlying musculature.  The pedicle flap was carefully excised within this deep plane to maintain its blood supply.  The edges of the donor site were undermined.   The donor site was closed in a primary fashion.  The pedicle was then rotated into position and sutured.  Once the tube was sutured into place, adequate blood supply was confirmed with blanching and refill.  The pedicle was then wrapped with xeroform gauze and dressed appropriately with a telfa and gauze bandage to ensure continued blood supply and protect the attached pedicle.
Complex Repair And Double Advancement Flap Text: The defect edges were debeveled with a #15 scalpel blade.  The primary defect was closed partially with a complex linear closure.  Given the location of the remaining defect, shape of the defect and the proximity to free margins a double advancement flap was deemed most appropriate for complete closure of the defect.  Using a sterile surgical marker, an appropriate advancement flap was drawn incorporating the defect and placing the expected incisions within the relaxed skin tension lines where possible.    The area thus outlined was incised deep to adipose tissue with a #15 scalpel blade.  The skin margins were undermined to an appropriate distance in all directions utilizing iris scissors.
Information: Selecting Yes will display possible errors in your note based on the variables you have selected. This validation is only offered as a suggestion for you. PLEASE NOTE THAT THE VALIDATION TEXT WILL BE REMOVED WHEN YOU FINALIZE YOUR NOTE. IF YOU WANT TO FAX A PRELIMINARY NOTE YOU WILL NEED TO TOGGLE THIS TO 'NO' IF YOU DO NOT WANT IT IN YOUR FAXED NOTE.
Pre-Excision Curettage Text (Leave Blank If You Do Not Want): Prior to drawing the surgical margin the visible lesion was removed with electrodesiccation and curettage to clearly define the lesion size.
Complex Repair And Tissue Cultured Epidermal Autograft Text: The defect edges were debeveled with a #15 scalpel blade.  The primary defect was closed partially with a complex linear closure.  Given the location of the defect, shape of the defect and the proximity to free margins an tissue cultured epidermal autograft was deemed most appropriate to repair the remaining defect.  The graft was trimmed to fit the size of the remaining defect.  The graft was then placed in the primary defect, oriented appropriately, and sutured into place.
Dorsal Nasal Flap Text: The defect edges were debeveled with a #15 scalpel blade.  Given the location of the defect and the proximity to free margins a dorsal nasal flap was deemed most appropriate.  Using a sterile surgical marker, an appropriate dorsal nasal flap was drawn around the defect.    The area thus outlined was incised deep to adipose tissue with a #15 scalpel blade.  The skin margins were undermined to an appropriate distance in all directions utilizing iris scissors.
Repair Performed By Another Provider Text (Leave Blank If You Do Not Want): After the tissue was excised the defect was repaired by another provider.
Mastoid Interpolation Flap Text: A decision was made to reconstruct the defect utilizing an interpolation axial flap and a staged reconstruction.  A telfa template was made of the defect.  This telfa template was then used to outline the mastoid interpolation flap.  The donor area for the pedicle flap was then injected with anesthesia.  The flap was excised through the skin and subcutaneous tissue down to the layer of the underlying musculature.  The pedicle flap was carefully excised within this deep plane to maintain its blood supply.  The edges of the donor site were undermined.   The donor site was closed in a primary fashion.  The pedicle was then rotated into position and sutured.  Once the tube was sutured into place, adequate blood supply was confirmed with blanching and refill.  The pedicle was then wrapped with xeroform gauze and dressed appropriately with a telfa and gauze bandage to ensure continued blood supply and protect the attached pedicle.
Complex Repair And Modified Advancement Flap Text: The defect edges were debeveled with a #15 scalpel blade.  The primary defect was closed partially with a complex linear closure.  Given the location of the remaining defect, shape of the defect and the proximity to free margins a modified advancement flap was deemed most appropriate for complete closure of the defect.  Using a sterile surgical marker, an appropriate advancement flap was drawn incorporating the defect and placing the expected incisions within the relaxed skin tension lines where possible.    The area thus outlined was incised deep to adipose tissue with a #15 scalpel blade.  The skin margins were undermined to an appropriate distance in all directions utilizing iris scissors.
Complex Repair And Xenograft Text: The defect edges were debeveled with a #15 scalpel blade.  The primary defect was closed partially with a complex linear closure.  Given the location of the defect, shape of the defect and the proximity to free margins a xenograft was deemed most appropriate to repair the remaining defect.  The graft was trimmed to fit the size of the remaining defect.  The graft was then placed in the primary defect, oriented appropriately, and sutured into place.
Posterior Auricular Interpolation Flap Text: A decision was made to reconstruct the defect utilizing an interpolation axial flap and a staged reconstruction.  A telfa template was made of the defect.  This telfa template was then used to outline the posterior auricular interpolation flap.  The donor area for the pedicle flap was then injected with anesthesia.  The flap was excised through the skin and subcutaneous tissue down to the layer of the underlying musculature.  The pedicle flap was carefully excised within this deep plane to maintain its blood supply.  The edges of the donor site were undermined.   The donor site was closed in a primary fashion.  The pedicle was then rotated into position and sutured.  Once the tube was sutured into place, adequate blood supply was confirmed with blanching and refill.  The pedicle was then wrapped with xeroform gauze and dressed appropriately with a telfa and gauze bandage to ensure continued blood supply and protect the attached pedicle.
Detail Level: Detailed
Complex Repair And A-T Advancement Flap Text: The defect edges were debeveled with a #15 scalpel blade.  The primary defect was closed partially with a complex linear closure.  Given the location of the remaining defect, shape of the defect and the proximity to free margins an A-T advancement flap was deemed most appropriate for complete closure of the defect.  Using a sterile surgical marker, an appropriate advancement flap was drawn incorporating the defect and placing the expected incisions within the relaxed skin tension lines where possible.    The area thus outlined was incised deep to adipose tissue with a #15 scalpel blade.  The skin margins were undermined to an appropriate distance in all directions utilizing iris scissors.
No Repair - Repaired With Adjacent Surgical Defect Text (Leave Blank If You Do Not Want): After the excision the defect was repaired concurrently with another surgical defect which was in close approximation.
Epidermal Sutures: 4-0 Nylon
Island Pedicle Flap Text: The defect edges were debeveled with a #15 scalpel blade.  Given the location of the defect, shape of the defect and the proximity to free margins an island pedicle advancement flap was deemed most appropriate.  Using a sterile surgical marker, an appropriate advancement flap was drawn incorporating the defect, outlining the appropriate donor tissue and placing the expected incisions within the relaxed skin tension lines where possible.    The area thus outlined was incised deep to adipose tissue with a #15 scalpel blade.  The skin margins were undermined to an appropriate distance in all directions around the primary defect and laterally outward around the island pedicle utilizing iris scissors.  There was minimal undermining beneath the pedicle flap.
Complex Repair And Skin Substitute Graft Text: The defect edges were debeveled with a #15 scalpel blade.  The primary defect was closed partially with a complex linear closure.  Given the location of the remaining defect, shape of the defect and the proximity to free margins a skin substitute graft was deemed most appropriate to repair the remaining defect.  The graft was trimmed to fit the size of the remaining defect.  The graft was then placed in the primary defect, oriented appropriately, and sutured into place.
Paramedian Forehead Flap Text: A decision was made to reconstruct the defect utilizing an interpolation axial flap and a staged reconstruction.  A telfa template was made of the defect.  This telfa template was then used to outline the paramedian forehead pedicle flap.  The donor area for the pedicle flap was then injected with anesthesia.  The flap was excised through the skin and subcutaneous tissue down to the layer of the underlying musculature.  The pedicle flap was carefully excised within this deep plane to maintain its blood supply.  The edges of the donor site were undermined.   The donor site was closed in a primary fashion.  The pedicle was then rotated into position and sutured.  Once the tube was sutured into place, adequate blood supply was confirmed with blanching and refill.  The pedicle was then wrapped with xeroform gauze and dressed appropriately with a telfa and gauze bandage to ensure continued blood supply and protect the attached pedicle.
Advancement Flap (Single) Text: The defect edges were debeveled with a #15 scalpel blade.  Given the location of the defect and the proximity to free margins a single advancement flap was deemed most appropriate.  Using a sterile surgical marker, an appropriate advancement flap was drawn incorporating the defect and placing the expected incisions within the relaxed skin tension lines where possible.    The area thus outlined was incised deep to adipose tissue with a #15 scalpel blade.  The skin margins were undermined to an appropriate distance in all directions utilizing iris scissors.
Complex Repair And O-T Advancement Flap Text: The defect edges were debeveled with a #15 scalpel blade.  The primary defect was closed partially with a complex linear closure.  Given the location of the remaining defect, shape of the defect and the proximity to free margins an O-T advancement flap was deemed most appropriate for complete closure of the defect.  Using a sterile surgical marker, an appropriate advancement flap was drawn incorporating the defect and placing the expected incisions within the relaxed skin tension lines where possible.    The area thus outlined was incised deep to adipose tissue with a #15 scalpel blade.  The skin margins were undermined to an appropriate distance in all directions utilizing iris scissors.
Island Pedicle Flap With Canthal Suspension Text: The defect edges were debeveled with a #15 scalpel blade.  Given the location of the defect, shape of the defect and the proximity to free margins an island pedicle advancement flap was deemed most appropriate.  Using a sterile surgical marker, an appropriate advancement flap was drawn incorporating the defect, outlining the appropriate donor tissue and placing the expected incisions within the relaxed skin tension lines where possible. The area thus outlined was incised deep to adipose tissue with a #15 scalpel blade.  The skin margins were undermined to an appropriate distance in all directions around the primary defect and laterally outward around the island pedicle utilizing iris scissors.  There was minimal undermining beneath the pedicle flap. A suspension suture was placed in the canthal tendon to prevent tension and prevent ectropion.
Advancement Flap (Double) Text: The defect edges were debeveled with a #15 scalpel blade.  Given the location of the defect and the proximity to free margins a double advancement flap was deemed most appropriate.  Using a sterile surgical marker, the appropriate advancement flaps were drawn incorporating the defect and placing the expected incisions within the relaxed skin tension lines where possible.    The area thus outlined was incised deep to adipose tissue with a #15 scalpel blade.  The skin margins were undermined to an appropriate distance in all directions utilizing iris scissors.
Lip Wedge Excision Repair Text: Given the location of the defect and the proximity to free margins a full thickness wedge repair was deemed most appropriate.  Using a sterile surgical marker, the appropriate repair was drawn incorporating the defect and placing the expected incisions perpendicular to the vermilion border.  The vermilion border was also meticulously outlined to ensure appropriate reapproximation during the repair.  The area thus outlined was incised through and through with a #15 scalpel blade.  The muscularis and dermis were reaproximated with deep sutures following hemostasis. Care was taken to realign the vermilion border before proceeding with the superficial closure.  Once the vermilion was realigned the superfical and mucosal closure was finished.
Path Notes (To The Dermatopathologist): Please check margins.
Complex Repair And O-L Flap Text: The defect edges were debeveled with a #15 scalpel blade.  The primary defect was closed partially with a complex linear closure.  Given the location of the remaining defect, shape of the defect and the proximity to free margins an O-L flap was deemed most appropriate for complete closure of the defect.  Using a sterile surgical marker, an appropriate flap was drawn incorporating the defect and placing the expected incisions within the relaxed skin tension lines where possible.    The area thus outlined was incised deep to adipose tissue with a #15 scalpel blade.  The skin margins were undermined to an appropriate distance in all directions utilizing iris scissors.
Alar Island Pedicle Flap Text: The defect edges were debeveled with a #15 scalpel blade.  Given the location of the defect, shape of the defect and the proximity to the alar rim an island pedicle advancement flap was deemed most appropriate.  Using a sterile surgical marker, an appropriate advancement flap was drawn incorporating the defect, outlining the appropriate donor tissue and placing the expected incisions within the nasal ala running parallel to the alar rim. The area thus outlined was incised with a #15 scalpel blade.  The skin margins were undermined minimally to an appropriate distance in all directions around the primary defect and laterally outward around the island pedicle utilizing iris scissors.  There was minimal undermining beneath the pedicle flap.
Ftsg Text: The defect edges were debeveled with a #15 scalpel blade.  Given the location of the defect, shape of the defect and the proximity to free margins a full thickness skin graft was deemed most appropriate.  Using a sterile surgical marker, the primary defect shape was transferred to the donor site. The area thus outlined was incised deep to adipose tissue with a #15 scalpel blade.  The harvested graft was then trimmed of adipose tissue until only dermis and epidermis was left.  The skin margins of the secondary defect were undermined to an appropriate distance in all directions utilizing iris scissors.  The secondary defect was closed with interrupted buried subcutaneous sutures.  The skin edges were then re-apposed with running  sutures.  The skin graft was then placed in the primary defect and oriented appropriately.
Burow's Advancement Flap Text: The defect edges were debeveled with a #15 scalpel blade.  Given the location of the defect and the proximity to free margins a Burow's advancement flap was deemed most appropriate.  Using a sterile surgical marker, the appropriate advancement flap was drawn incorporating the defect and placing the expected incisions within the relaxed skin tension lines where possible.    The area thus outlined was incised deep to adipose tissue with a #15 scalpel blade.  The skin margins were undermined to an appropriate distance in all directions utilizing iris scissors.
Complex Repair And Bilobe Flap Text: The defect edges were debeveled with a #15 scalpel blade.  The primary defect was closed partially with a complex linear closure.  Given the location of the remaining defect, shape of the defect and the proximity to free margins a bilobe flap was deemed most appropriate for complete closure of the defect.  Using a sterile surgical marker, an appropriate advancement flap was drawn incorporating the defect and placing the expected incisions within the relaxed skin tension lines where possible.    The area thus outlined was incised deep to adipose tissue with a #15 scalpel blade.  The skin margins were undermined to an appropriate distance in all directions utilizing iris scissors.
Spiral Flap Text: The defect edges were debeveled with a #15 scalpel blade.  Given the location of the defect, shape of the defect and the proximity to free margins a spiral flap was deemed most appropriate.  Using a sterile surgical marker, an appropriate rotation flap was drawn incorporating the defect and placing the expected incisions within the relaxed skin tension lines where possible. The area thus outlined was incised deep to adipose tissue with a #15 scalpel blade.  The skin margins were undermined to an appropriate distance in all directions utilizing iris scissors.

## 2019-05-20 ENCOUNTER — APPOINTMENT (RX ONLY)
Dept: URBAN - METROPOLITAN AREA CLINIC 22 | Facility: CLINIC | Age: 79
Setting detail: DERMATOLOGY
End: 2019-05-20

## 2019-05-20 DIAGNOSIS — Z48.02 ENCOUNTER FOR REMOVAL OF SUTURES: ICD-10-CM

## 2019-05-20 PROCEDURE — ? SUTURE REMOVAL (GLOBAL PERIOD)

## 2019-05-20 PROCEDURE — 99024 POSTOP FOLLOW-UP VISIT: CPT

## 2019-05-20 ASSESSMENT — LOCATION ZONE DERM: LOCATION ZONE: ARM

## 2019-05-20 ASSESSMENT — LOCATION SIMPLE DESCRIPTION DERM: LOCATION SIMPLE: RIGHT FOREARM

## 2019-05-20 ASSESSMENT — LOCATION DETAILED DESCRIPTION DERM: LOCATION DETAILED: RIGHT PROXIMAL DORSAL FOREARM

## 2019-05-20 NOTE — PROCEDURE: SUTURE REMOVAL (GLOBAL PERIOD)
Add 84794 Cpt? (Important Note: In 2017 The Use Of 98458 Is Being Tracked By Cms To Determine Future Global Period Reimbursement For Global Periods): yes
Detail Level: Detailed

## 2019-08-08 ENCOUNTER — OFFICE VISIT (OUTPATIENT)
Dept: URGENT CARE | Facility: CLINIC | Age: 79
End: 2019-08-08
Payer: MEDICARE

## 2019-08-08 VITALS
DIASTOLIC BLOOD PRESSURE: 70 MMHG | WEIGHT: 169 LBS | RESPIRATION RATE: 16 BRPM | HEART RATE: 69 BPM | SYSTOLIC BLOOD PRESSURE: 118 MMHG | BODY MASS INDEX: 28.12 KG/M2 | OXYGEN SATURATION: 95 % | TEMPERATURE: 98.5 F

## 2019-08-08 DIAGNOSIS — J44.1 COPD EXACERBATION (HCC): ICD-10-CM

## 2019-08-08 PROCEDURE — 99214 OFFICE O/P EST MOD 30 MIN: CPT | Performed by: PHYSICIAN ASSISTANT

## 2019-08-08 RX ORDER — PREDNISONE 20 MG/1
TABLET ORAL
Qty: 10 TAB | Refills: 0 | Status: SHIPPED | OUTPATIENT
Start: 2019-08-08 | End: 2019-08-13

## 2019-08-08 RX ORDER — DOXYCYCLINE HYCLATE 100 MG/1
100 CAPSULE ORAL 2 TIMES DAILY
Qty: 14 EACH | Refills: 0 | Status: SHIPPED | OUTPATIENT
Start: 2019-08-08 | End: 2019-08-15

## 2019-08-08 ASSESSMENT — ENCOUNTER SYMPTOMS
COUGH: 1
SHORTNESS OF BREATH: 1
CHILLS: 0
FEVER: 0
SORE THROAT: 0
SPUTUM PRODUCTION: 0
HEMOPTYSIS: 0
WHEEZING: 1
PALPITATIONS: 0

## 2019-08-08 ASSESSMENT — COPD QUESTIONNAIRES: COPD: 1

## 2019-08-08 NOTE — PROGRESS NOTES
Subjective:      [place] Ernst is a 79 y.o. female who presents with URI (x 1 month with asthma states about last 7 days she has been feeling worse at night)            Wheezing    This is a recurrent problem. The current episode started 1 to 4 weeks ago. The problem occurs constantly. Associated symptoms include coughing and shortness of breath. Pertinent negatives include no chest pain, chills, ear pain, fever, hemoptysis, sore throat or sputum production. Nothing aggravates the symptoms. She has tried beta agonist inhalers, leukotriene antagonists and ipratropium inhalers for the symptoms. The treatment provided mild relief. Her past medical history is significant for asthma and COPD.       Review of Systems   Constitutional: Positive for malaise/fatigue. Negative for chills and fever.   HENT: Negative for congestion, ear pain and sore throat.    Respiratory: Positive for cough, shortness of breath and wheezing. Negative for hemoptysis and sputum production.    Cardiovascular: Negative for chest pain and palpitations.   All other systems reviewed and are negative.    PMH:  has a past medical history of Arthritis, Asthma, Breath shortness, COPD (chronic obstructive pulmonary disease) (HCC), Dental disorder, Disorder of thyroid, Emphysema of lung (HCC), High cholesterol, Hypertension, and Pain.  MEDS:   Current Outpatient Medications:   •  doxycycline (VIBRAMYCIN) 100 MG Cap, Take 1 Cap by mouth 2 times a day for 7 days., Disp: 14 Each, Rfl: 0  •  predniSONE (DELTASONE) 20 MG Tab, Take 40 mg by mouth once daily for 5 days., Disp: 10 Tab, Rfl: 0  •  ipratropium (ATROVENT) 0.02 % Solution, INHALE 1 VIAL IN NEBULIZER TWICE DAILY, Disp: 100 Bullet, Rfl: 0  •  alendronate (FOSAMAX) 70 MG Tab, TAKE 1 TABLET BY MOUTH ONCE A WEEK, Disp: 12 Tab, Rfl: 3  •  amLODIPine (NORVASC) 10 MG Tab, TAKE 1 TABLET BY MOUTH ONCE DAILY, Disp: 90 Tab, Rfl: 3  •  levETIRAcetam (KEPPRA) 250 MG tablet, TAKE 1 TABLET BY MOUTH TWICE DAILY,  Disp: 180 Tab, Rfl: 1  •  levETIRAcetam (KEPPRA) 250 MG tablet, Take 1 Tab by mouth 2 times a day., Disp: 180 Tab, Rfl: 2  •  memantine (NAMENDA) 5 MG Tab, Take 1 Tab by mouth 2 times a day. TWICE DAILY, Disp: 180 Tab, Rfl: 2  •  albuterol (PROVENTIL) 2.5mg/3ml Nebu Soln solution for nebulization, USE 1 VIAL IN NEBULIZER EVERY 4 HOURS AS NEEDED FOR SHORTNESS OF BREATH, Disp: 300 Bullet, Rfl: 6  •  levothyroxine (SYNTHROID) 125 MCG Tab, TAKE 1 TABLET BY MOUTH ONCE DAILY IN THE MORNING ON AN EMPTY STOMACH, Disp: 90 Tab, Rfl: 3  •  folic acid (FOLVITE) 1 MG Tab, TAKE 1 TABLET BY MOUTH ONCE DAILY, Disp: 90 Tab, Rfl: 3  •  Diclofenac Sodium (VOLTAREN) 1 % Gel, Apply 1 g to skin as directed 4 times a day., Disp: 5 Tube, Rfl: 6  •  SYMBICORT 160-4.5 MCG/ACT Aerosol, INHALE ONE PUFF BY MOUTH TWICE DAILY, Disp: 1 Inhaler, Rfl: 3  •  metoprolol SR (TOPROL XL) 100 MG TABLET SR 24 HR, TAKE ONE TABLET BY MOUTH ONCE DAILY, Disp: 90 Tab, Rfl: 3  •  pravastatin (PRAVACHOL) 40 MG tablet, TAKE ONE TABLET BY MOUTH ONCE DAILY, Disp: 90 Tab, Rfl: 3  •  citalopram (CELEXA) 20 MG Tab, TAKE ONE TABLET BY MOUTH ONCE DAILY, Disp: 90 Tab, Rfl: 3  •  alendronate (FOSAMAX) 70 MG Tab, Take 70 mg by mouth every 7 days. Sunday, Disp: , Rfl:   •  meclizine (ANTIVERT) 25 MG Tab, Take 25 mg by mouth 3 times a day as needed for Vertigo., Disp: , Rfl:   •  PROAIR  (90 Base) MCG/ACT Aero Soln inhalation aerosol, INHALE TWO PUFFS BY MOUTH EVERY 6 HOURS AS NEEDED FOR SHORTNESS OF BREATH, Disp: 9 Inhaler, Rfl: 3  •  SYMBICORT 160-4.5 MCG/ACT Aerosol, INHALE ONE PUFF BY MOUTH TWICE DAILY, Disp: 6 g, Rfl: 6  •  acetaminophen (TYLENOL) 500 MG Tab, Take 1,000 mg by mouth every 6 hours as needed for Moderate Pain., Disp: 30 Tab, Rfl: 0  •  enalapril (VASOTEC) 20 MG tablet, Take 1 Tab by mouth 2 times a day., Disp: 180 Tab, Rfl: 3  ALLERGIES: No Known Allergies  SURGHX:   Past Surgical History:   Procedure Laterality Date   • TEMPORAL ARTERY BIOPSY Left  2018    Procedure: TEMPORAL ARTERY BIOPSY;  Surgeon: Orestes Mina M.D.;  Location: SURGERY [facility];  Service: General   • GYN SURGERY         • HYSTERECTOMY, TOTAL ABDOMINAL       SOCHX:  reports that she has never smoked. She has never used smokeless tobacco. She reports that she does not drink alcohol or use drugs.  FH: Family history was reviewed, no pertinent findings to report  Medications, Allergies, and current problem list reviewed today in Epic       Objective:     /70 (BP Location: Left arm, Patient Position: Sitting, BP Cuff Size: Large adult)   Pulse 69   Temp 36.9 °C (98.5 °F) (Temporal)   Resp 16   Wt 76.7 kg (169 lb)   SpO2 95%   BMI 28.12 kg/m²      Physical Exam   Constitutional: She is oriented to person, place, and time. She appears well-developed and well-nourished. She is active.  Non-toxic appearance. She does not have a sickly appearance. She does not appear ill. No distress. She is not intubated.   HENT:   Head: Normocephalic and atraumatic.   Right Ear: Hearing, tympanic membrane, external ear and ear canal normal.   Left Ear: Hearing, tympanic membrane, external ear and ear canal normal.   Nose: Nose normal.   Mouth/Throat: Uvula is midline, oropharynx is clear and moist and mucous membranes are normal.   Eyes: Conjunctivae, EOM and lids are normal.   Neck: Normal range of motion and full passive range of motion without pain. Neck supple.   Cardiovascular: Regular rhythm, S1 normal, S2 normal and normal heart sounds. Exam reveals no gallop and no friction rub.   No murmur heard.  Pulmonary/Chest: Effort normal. No accessory muscle usage or stridor. No apnea, no tachypnea and no bradypnea. She is not intubated. No respiratory distress. She has no decreased breath sounds. She has wheezes. She has no rhonchi. She has no rales. She exhibits no tenderness.   Musculoskeletal: Normal range of motion.   Neurological: She is alert and oriented to person,  place, and time.   Skin: Skin is warm and dry.   Psychiatric: She has a normal mood and affect. Her speech is normal and behavior is normal. Judgment and thought content normal.   Vitals reviewed.              Assessment/Plan:   Vitals normal, pt not in acute distress.  Wheezes throughout all lung fields.  No new or worsening swelling of the legs.  X ray not indicated at this time. Likely mild COPD exacerbation.     1. COPD exacerbation (HCC)    - doxycycline (VIBRAMYCIN) 100 MG Cap; Take 1 Cap by mouth 2 times a day for 7 days.  Dispense: 14 Each; Refill: 0  - predniSONE (DELTASONE) 20 MG Tab; Take 40 mg by mouth once daily for 5 days.  Dispense: 10 Tab; Refill: 0    Differential diagnosis, natural history, supportive care discussed. Follow-up with primary care provider within 7-10 days, emergency room precautions discussed.  Patient and/or family appears understanding of information.  Handout and review of patients diagnosis and treatment was discussed extensively.

## 2019-08-12 PROBLEM — E03.9 ACQUIRED HYPOTHYROIDISM: Chronic | Status: ACTIVE | Noted: 2017-01-25

## 2019-08-12 PROBLEM — Z91.81 RISK FOR FALLS: Status: RESOLVED | Noted: 2018-02-14 | Resolved: 2019-08-12

## 2019-08-12 PROBLEM — R76.8 ANTI-TPO ANTIBODIES PRESENT: Status: RESOLVED | Noted: 2019-04-11 | Resolved: 2019-08-12

## 2019-08-12 PROBLEM — E66.09 NON MORBID OBESITY DUE TO EXCESS CALORIES: Status: RESOLVED | Noted: 2017-01-25 | Resolved: 2019-08-12

## 2019-08-13 ENCOUNTER — OFFICE VISIT (OUTPATIENT)
Dept: MEDICAL GROUP | Facility: MEDICAL CENTER | Age: 79
End: 2019-08-13
Payer: MEDICARE

## 2019-08-13 VITALS
HEIGHT: 65 IN | BODY MASS INDEX: 28.49 KG/M2 | SYSTOLIC BLOOD PRESSURE: 126 MMHG | OXYGEN SATURATION: 95 % | HEART RATE: 61 BPM | TEMPERATURE: 98.4 F | DIASTOLIC BLOOD PRESSURE: 60 MMHG | WEIGHT: 171 LBS

## 2019-08-13 DIAGNOSIS — F33.9 EPISODE OF RECURRENT MAJOR DEPRESSIVE DISORDER, UNSPECIFIED DEPRESSION EPISODE SEVERITY (HCC): ICD-10-CM

## 2019-08-13 DIAGNOSIS — F02.80 LATE ONSET ALZHEIMER'S DISEASE WITHOUT BEHAVIORAL DISTURBANCE (HCC): ICD-10-CM

## 2019-08-13 DIAGNOSIS — R73.02 IGT (IMPAIRED GLUCOSE TOLERANCE): ICD-10-CM

## 2019-08-13 DIAGNOSIS — G30.1 LATE ONSET ALZHEIMER'S DISEASE WITHOUT BEHAVIORAL DISTURBANCE (HCC): ICD-10-CM

## 2019-08-13 DIAGNOSIS — Z91.81 RISK FOR FALLS: ICD-10-CM

## 2019-08-13 DIAGNOSIS — E78.5 DYSLIPIDEMIA: ICD-10-CM

## 2019-08-13 DIAGNOSIS — J44.9 CHRONIC OBSTRUCTIVE PULMONARY DISEASE, UNSPECIFIED COPD TYPE (HCC): ICD-10-CM

## 2019-08-13 DIAGNOSIS — G40.909 SEIZURE DISORDER (HCC): ICD-10-CM

## 2019-08-13 DIAGNOSIS — Z00.00 MEDICARE ANNUAL WELLNESS VISIT, SUBSEQUENT: ICD-10-CM

## 2019-08-13 DIAGNOSIS — E03.9 ACQUIRED HYPOTHYROIDISM: Chronic | ICD-10-CM

## 2019-08-13 DIAGNOSIS — I70.0 ATHEROSCLEROSIS OF AORTA (HCC): ICD-10-CM

## 2019-08-13 DIAGNOSIS — N39.46 MIXED INCONTINENCE: ICD-10-CM

## 2019-08-13 DIAGNOSIS — I10 ESSENTIAL HYPERTENSION: ICD-10-CM

## 2019-08-13 LAB
HBA1C MFR BLD: 5.5 % (ref 0–5.6)
INT CON NEG: NEGATIVE
INT CON POS: POSITIVE

## 2019-08-13 PROCEDURE — 99214 OFFICE O/P EST MOD 30 MIN: CPT | Mod: 25 | Performed by: INTERNAL MEDICINE

## 2019-08-13 PROCEDURE — G0439 PPPS, SUBSEQ VISIT: HCPCS | Performed by: INTERNAL MEDICINE

## 2019-08-13 PROCEDURE — 83036 HEMOGLOBIN GLYCOSYLATED A1C: CPT | Performed by: INTERNAL MEDICINE

## 2019-08-13 RX ORDER — LOSARTAN POTASSIUM 100 MG/1
100 TABLET ORAL DAILY
Qty: 90 TAB | Refills: 3 | Status: SHIPPED | OUTPATIENT
Start: 2019-08-13 | End: 2020-06-18 | Stop reason: SDUPTHER

## 2019-08-13 ASSESSMENT — PATIENT HEALTH QUESTIONNAIRE - PHQ9
CLINICAL INTERPRETATION OF PHQ2 SCORE: 3
SUM OF ALL RESPONSES TO PHQ QUESTIONS 1-9: 9
5. POOR APPETITE OR OVEREATING: 0 - NOT AT ALL

## 2019-08-13 ASSESSMENT — ACTIVITIES OF DAILY LIVING (ADL): BATHING_REQUIRES_ASSISTANCE: 0

## 2019-08-13 ASSESSMENT — ENCOUNTER SYMPTOMS: GENERAL WELL-BEING: FAIR

## 2019-08-13 NOTE — PROGRESS NOTES
CC: Follow-up COPD, blood sugar, dyslipidemia due for wellness examination    HPI:   [place] presents today with the following.      1. Medicare annual wellness visit, subsequent  Screenings performed below information given on advanced directives    2. Chronic obstructive pulmonary disease, unspecified COPD type (HCC)  Flynn recent visit to urgent care with what sounds like a COPD exacerbation.  Breathing had acute she does sound as if she is compliant with inhalers she was given a steroid and antibiotic.  Reports some nausea with antibiotics she is almost done 1 more day left.  No fevers or chills cough is fairly nonproductive and clear in nature.  Denies any other infectious symptoms.    3. IGT (impaired glucose tolerance)  Elevated in the past under fair control as of last check coming due for repeat blood work.    4. Dyslipidemia  Maintain on statin again coming due for blood work.          Information for advance directives given to patient or instructed to bring in advance directives into to office to put in chart.      Depression Screening    Little interest or pleasure in doing things?  0 - not at all  Feeling down, depressed , or hopeless? 3 - nearly every day  Trouble falling or staying asleep, or sleeping too much?  0 - not at all  Feeling tired or having little energy?  3 - nearly every day  Poor appetite or overeating?  0 - not at all  Feeling bad about yourself - or that you are a failure or have let yourself or your family down? 0 - not at all  Trouble concentrating on things, such as reading the newspaper or watching television? 3 - nearly every day  Moving or speaking so slowly that other people could have noticed.  Or the opposite - being so fidgety or restless that you have been moving around a lot more than usual?  0 - not at all  Thoughts that you would be better off dead, or of hurting yourself?  0 - not at all  Patient Health Questionnaire Score: 9    If depressive symptoms identified  deferred to follow up visit unless specifically addressed in assessment and plan.    Interpretation of PHQ-9 Total Score   Score Severity   1-4 No Depression   5-9 Mild Depression   10-14 Moderate Depression   15-19 Moderately Severe Depression   20-27 Severe Depression      Screening for Cognitive Impairment    Three Minute Recall (village, kitchen, baby) 0/3    Alphonso clock face with all 12 numbers and set the hands to show 10 past 10.  Yes 4/5  Cognitive concerns identified deferred for follow up unless specifically addressed in assessment and plan.    Fall Risk Assessment    Has the patient had two or more falls in the last year or any fall with injury in the last year?  Yes    Safety Assessment    Throw rugs on floor.  Yes  Handrails on all stairs.  Yes  Good lighting in all hallways.  Yes  Difficulty hearing.  Yes  Patient counseled about all safety risks that were identified.    Functional Assessment ADLs    Are there any barriers preventing you from cooking for yourself or meeting nutritional needs?  No.    Are there any barriers preventing you from driving safely or obtaining transportation?  Yes.    Are there any barriers preventing you from using a telephone or calling for help?  No.    Are there any barriers preventing you from shopping?  No.    Are there any barriers preventing you from taking care of your own finances?  Yes.    Are there any barriers preventing you from managing your medications?  Yes.    Are there any barriers preventing you from showering, bathing or dressing yourself? No.    Are you currently engaging in any exercise or physical activity?  Yes.     What is your perception of your health?  Fair.      Health Maintenance Summary                BONE DENSITY Overdue 6/22/2019      Done 6/22/2017 DS-BONE DENSITY STUDY (DEXA)    IMM DTaP/Tdap/Td Vaccine Postponed 2/9/2028 Originally 5/21/1959. Insurance/Financial    PFT SCREENING-FEV1 AND FEV/FVC RATIO / SPIROMETRY SHOULD BE PERFORMED  ANNUALLY Postponed 8/16/2029 Originally 5/21/1958. Insurance/Financial    IMM ZOSTER VACCINES Postponed 2/16/2033 Originally 5/21/1988. Insurance/Financial    IMM INFLUENZA Next Due 9/1/2019      Done 2/13/2019 Imm Admin: Influenza Vaccine Adult HD     Patient has more history with this topic...    Annual Wellness Visit Next Due 8/13/2020      Done 8/13/2019 Visit Dx: Medicare annual wellness visit, subsequent     Patient has more history with this topic...          Patient Care Team:  Orestes Warren M.D. as PCP - General (Internal Medicine)  Liat Roger as            Health Care Screening: Age-appropriate preventive services that Medicare covers were discussed today and ordered as indicated and agreed upon by the patient, and as recommended by USPTF and ACIP.     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   • Seizure disorder (HCC) 02/12/2019   • Atherosclerosis of aorta (HCC) 02/12/2019   • Mixed incontinence 08/14/2018   • Osteoporosis without current pathological fracture 06/30/2017   • Vitamin B12 deficiency 04/21/2017   • IGT (impaired glucose tolerance) 04/03/2017   • Alzheimer's dementia without behavioral disturbance 02/08/2017   • Essential hypertension 01/25/2017   • Chronic left shoulder pain 01/25/2017   • Acquired hypothyroidism 01/25/2017   • Dyslipidemia 01/25/2017   • Episode of recurrent major depressive disorder (HCC) 01/25/2017   • Chronic obstructive pulmonary disease (HCC) 01/25/2017       Current Outpatient Medications   Medication Sig Dispense Refill   • Fluticasone-Umeclidin-Vilant (TRELEGY ELLIPTA) 100-62.5-25 MCG/INH AEROSOL POWDER, BREATH ACTIVATED Inhale 1 Puff by mouth every day. 1 Each 11   • losartan (COZAAR) 100 MG Tab Take 1 Tab by mouth every day. 90 Tab 3   • doxycycline (VIBRAMYCIN) 100 MG Cap Take 1 Cap by mouth 2 times a day for 7 days. 14 Each 0   • alendronate  (FOSAMAX) 70 MG Tab TAKE 1 TABLET BY MOUTH ONCE A WEEK 12 Tab 3   • levETIRAcetam (KEPPRA) 250 MG tablet Take 1 Tab by mouth 2 times a day. 180 Tab 2   • memantine (NAMENDA) 5 MG Tab Take 1 Tab by mouth 2 times a day. TWICE DAILY 180 Tab 2   • albuterol (PROVENTIL) 2.5mg/3ml Nebu Soln solution for nebulization USE 1 VIAL IN NEBULIZER EVERY 4 HOURS AS NEEDED FOR SHORTNESS OF BREATH 300 Bullet 6   • levothyroxine (SYNTHROID) 125 MCG Tab TAKE 1 TABLET BY MOUTH ONCE DAILY IN THE MORNING ON AN EMPTY STOMACH 90 Tab 3   • folic acid (FOLVITE) 1 MG Tab TAKE 1 TABLET BY MOUTH ONCE DAILY 90 Tab 3   • Diclofenac Sodium (VOLTAREN) 1 % Gel Apply 1 g to skin as directed 4 times a day. 5 Tube 6   • metoprolol SR (TOPROL XL) 100 MG TABLET SR 24 HR TAKE ONE TABLET BY MOUTH ONCE DAILY 90 Tab 3   • pravastatin (PRAVACHOL) 40 MG tablet TAKE ONE TABLET BY MOUTH ONCE DAILY 90 Tab 3   • citalopram (CELEXA) 20 MG Tab TAKE ONE TABLET BY MOUTH ONCE DAILY 90 Tab 3   • alendronate (FOSAMAX) 70 MG Tab Take 70 mg by mouth every 7 days. Sunday     • PROAIR  (90 Base) MCG/ACT Aero Soln inhalation aerosol INHALE TWO PUFFS BY MOUTH EVERY 6 HOURS AS NEEDED FOR SHORTNESS OF BREATH 9 Inhaler 3   • acetaminophen (TYLENOL) 500 MG Tab Take 1,000 mg by mouth every 6 hours as needed for Moderate Pain. 30 Tab 0     No current facility-administered medications for this visit.        Family History   Problem Relation Age of Onset   • Stroke Mother    • No Known Problems Father    • Heart Disease Brother        Social History     Socioeconomic History   • Marital status:      Spouse name: Not on file   • Number of children: Not on file   • Years of education: Not on file   • Highest education level: Not on file   Occupational History   • Not on file   Social Needs   • Financial resource strain: Not on file   • Food insecurity:     Worry: Not on file     Inability: Not on file   • Transportation needs:     Medical: Not on file     Non-medical: Not  "on file   Tobacco Use   • Smoking status: Never Smoker   • Smokeless tobacco: Never Used   Substance and Sexual Activity   • Alcohol use: No   • Drug use: No   • Sexual activity: Yes     Partners: Male   Lifestyle   • Physical activity:     Days per week: Not on file     Minutes per session: Not on file   • Stress: Not on file   Relationships   • Social connections:     Talks on phone: Not on file     Gets together: Not on file     Attends [employer] service: Not on file     Active member of club or organization: Not on file     Attends meetings of clubs or organizations: Not on file     Relationship status: Not on file   • Intimate partner violence:     Fear of current or ex partner: Not on file     Emotionally abused: Not on file     Physically abused: Not on file     Forced sexual activity: Not on file   Other Topics Concern   • Not on file   Social History Narrative   • Not on file       Past Surgical History:   Procedure Laterality Date   • TEMPORAL ARTERY BIOPSY Left 2018    Procedure: TEMPORAL ARTERY BIOPSY;  Surgeon: Orestes Mina M.D.;  Location: SURGERY [facility];  Service: General   • GYN SURGERY         • HYSTERECTOMY, TOTAL ABDOMINAL         Allergies as of 2019   • (No Known Allergies)        ROS: Denies Chest pain, SOB, LE edema.    /60 (BP Location: Left arm, Patient Position: Sitting)   Pulse 61   Temp 36.9 °C (98.4 °F)   Ht 1.651 m (5' 5\")   Wt 77.6 kg (171 lb)   SpO2 95%   BMI 28.46 kg/m²     Physical Exam:  Gen:         Alert and oriented, No apparent distress.  Neck:        No Lymphadenopathy or Bruits.  Lungs:     Clear to auscultation bilaterally  CV:          Regular rate and rhythm. No murmurs, rubs or gallops.  Abd:         Soft non tender, non distended. Normal active bowel sounds.  No  Hepatosplenomegaly, No pulsatile masses.                   Ext:          No clubbing, cyanosis, edema.      Assessment and Plan.   79 y.o. female with the " following issues.    1. Medicare annual wellness visit, subsequent  Discussed healthy lifestyle habits as well as screening regimens.  - Subsequent Annual Wellness Visit - Includes PPPS ()    2. Chronic obstructive pulmonary disease, unspecified COPD type (HCC)  In efforts to try and simplify medications have stopped maintenance inhalers except for Trelegy if affordable.  Continue as needed albuterol.    3. IGT (impaired glucose tolerance)  Rechecking labs.  - POCT Hemoglobin A1C  - HEMOGLOBIN A1C; Future    4. Dyslipidemia  Recheck cholesterol  - Comp Metabolic Panel; Future  - Lipid Profile; Future    5. Episode of recurrent major depressive disorder, unspecified depression episode severity (HCC)  Likely doing well  - Subsequent Annual Wellness Visit - Includes PPPS ()    6. Mixed incontinence  Session about Keagle exercises and timed voiding.  - Subsequent Annual Wellness Visit - Includes PPPS ()    7. Seizure disorder ([facility])  Stable follow with neurology  - Subsequent Annual Wellness Visit - Includes PPPS ()    8. Essential hypertension  Currently well controlled, Discuss diet, exercise and salt restriction.  No change to medication therapy.  - Subsequent Annual Wellness Visit - Includes PPPS ()    9. Atherosclerosis of aorta (HCC)  Continue risk reduction  - Subsequent Annual Wellness Visit - Includes PPPS ()    10. Late onset Alzheimer's disease without behavioral disturbance  Pain on medication  - Subsequent Annual Wellness Visit - Includes PPPS ()    11. Acquired hypothyroidism  Recheck next lab draw  - Subsequent Annual Wellness Visit - Includes PPPS ()  - TSH; Future    12. Risk for falls    - Patient identified as fall risk.  Appropriate orders and counseling given.        Referrals offered: Community-based lifestyle interventions to reduce health risks and promote self-management and wellness, fall prevention, nutrition, physical activity, tobacco-use cessation,  weight loss, and mental health services as per orders if indicated.    Discussion today about general wellness and lifestyle habits:    · Prevent falls and reduce trip hazards; Cautioned about securing or removing rugs.  · Have a working fire alarm and carbon monoxide detector;   · Engage in regular physical activity and social activities

## 2019-10-29 RX ORDER — PRAVASTATIN SODIUM 40 MG
TABLET ORAL
Qty: 90 TAB | Refills: 3 | Status: SHIPPED | OUTPATIENT
Start: 2019-10-29 | End: 2020-07-28 | Stop reason: SDUPTHER

## 2020-01-02 RX ORDER — CITALOPRAM 20 MG/1
TABLET ORAL
Qty: 90 TAB | Refills: 0 | Status: SHIPPED | OUTPATIENT
Start: 2020-01-02 | End: 2020-04-01

## 2020-01-06 ENCOUNTER — OFFICE VISIT (OUTPATIENT)
Dept: MEDICAL GROUP | Facility: MEDICAL CENTER | Age: 80
End: 2020-01-06
Payer: MEDICARE

## 2020-01-06 VITALS
HEIGHT: 65 IN | WEIGHT: 173 LBS | DIASTOLIC BLOOD PRESSURE: 60 MMHG | TEMPERATURE: 97.8 F | SYSTOLIC BLOOD PRESSURE: 122 MMHG | OXYGEN SATURATION: 94 % | BODY MASS INDEX: 28.82 KG/M2 | HEART RATE: 69 BPM

## 2020-01-06 DIAGNOSIS — R05.9 COUGH: ICD-10-CM

## 2020-01-06 PROCEDURE — 99213 OFFICE O/P EST LOW 20 MIN: CPT | Performed by: INTERNAL MEDICINE

## 2020-01-06 RX ORDER — BENZONATATE 100 MG/1
100 CAPSULE ORAL 3 TIMES DAILY PRN
Qty: 30 CAP | Refills: 0 | Status: SHIPPED
Start: 2020-01-06 | End: 2020-02-13

## 2020-01-06 RX ORDER — AMLODIPINE BESYLATE 10 MG/1
10 TABLET ORAL
Refills: 3 | COMMUNITY
Start: 2019-10-20 | End: 2020-04-08

## 2020-01-06 NOTE — PROGRESS NOTES
CC: Cough                                                                                                                                      HPI:   [place] presents today with the following.    1. Cough  Presents with approximately 5 days of cough.  No fevers or chills mostly nonproductive.  Denies any earache or sore throat no worsening breathing.  No other major symptomology.   is here to get the timing details.  He reports that he feels that she is getting slightly better over the last day      Patient Active Problem List    Diagnosis Date Noted   • Risk for falls 08/13/2019   • Autoimmune cerebritis ([facility]) 04/11/2019   • Seizure disorder ([facility]) 02/12/2019   • Atherosclerosis of aorta ([facility]) 02/12/2019   • Mixed incontinence 08/14/2018   • Osteoporosis without current pathological fracture 06/30/2017   • Vitamin B12 deficiency 04/21/2017   • IGT (impaired glucose tolerance) 04/03/2017   • Alzheimer's dementia without behavioral disturbance ([facility]) 02/08/2017   • Essential hypertension 01/25/2017   • Chronic left shoulder pain 01/25/2017   • Acquired hypothyroidism 01/25/2017   • Dyslipidemia 01/25/2017   • Episode of recurrent major depressive disorder ([facility]) 01/25/2017   • Chronic obstructive pulmonary disease ([facility]) 01/25/2017       Current Outpatient Medications   Medication Sig Dispense Refill   • benzonatate (TESSALON) 100 MG Cap Take 1 Cap by mouth 3 times a day as needed for Cough. 30 Cap 0   • citalopram (CELEXA) 20 MG Tab TAKE 1 TABLET BY MOUTH ONCE DAILY 90 Tab 0   • PROAIR  (90 Base) MCG/ACT Aero Soln inhalation aerosol INHALE TWO PUFFS BY MOUTH EVERY 6 HOURS AS NEEDED FOR SHORTNESS OF BREATH 3 Inhaler 3   • pravastatin (PRAVACHOL) 40 MG tablet TAKE ONE TABLET BY MOUTH ONCE DAILY 90 Tab 3   • Fluticasone-Umeclidin-Vilant (TRELEGY ELLIPTA) 100-62.5-25 MCG/INH AEROSOL POWDER, BREATH ACTIVATED Inhale 1 Puff by mouth every day. 1 Each 11   • losartan (COZAAR) 100 MG Tab Take 1 Tab by mouth  "every day. 90 Tab 3   • alendronate (FOSAMAX) 70 MG Tab TAKE 1 TABLET BY MOUTH ONCE A WEEK 12 Tab 3   • levETIRAcetam (KEPPRA) 250 MG tablet Take 1 Tab by mouth 2 times a day. 180 Tab 2   • memantine (NAMENDA) 5 MG Tab Take 1 Tab by mouth 2 times a day. TWICE DAILY 180 Tab 2   • albuterol (PROVENTIL) 2.5mg/3ml Nebu Soln solution for nebulization USE 1 VIAL IN NEBULIZER EVERY 4 HOURS AS NEEDED FOR SHORTNESS OF BREATH 300 Bullet 6   • levothyroxine (SYNTHROID) 125 MCG Tab TAKE 1 TABLET BY MOUTH ONCE DAILY IN THE MORNING ON AN EMPTY STOMACH 90 Tab 3   • folic acid (FOLVITE) 1 MG Tab TAKE 1 TABLET BY MOUTH ONCE DAILY 90 Tab 3   • Diclofenac Sodium (VOLTAREN) 1 % Gel Apply 1 g to skin as directed 4 times a day. 5 Tube 6   • metoprolol SR (TOPROL XL) 100 MG TABLET SR 24 HR TAKE ONE TABLET BY MOUTH ONCE DAILY 90 Tab 3   • alendronate (FOSAMAX) 70 MG Tab Take 70 mg by mouth every 7 days. Sunday     • acetaminophen (TYLENOL) 500 MG Tab Take 1,000 mg by mouth every 6 hours as needed for Moderate Pain. 30 Tab 0   • amLODIPine (NORVASC) 10 MG Tab Take 10 mg by mouth.  3     No current facility-administered medications for this visit.          Allergies as of 01/06/2020   • (No Known Allergies)        ROS: Denies Chest pain, SOB, LE edema.    /60 (BP Location: Right arm, Patient Position: Sitting)   Pulse 69   Temp 36.6 °C (97.8 °F)   Ht 1.651 m (5' 5\")   Wt 78.5 kg (173 lb)   SpO2 94%   BMI 28.79 kg/m²     Physical Exam:  Gen:         Alert and oriented, No apparent distress.  Heent:       TMs clear bilaterally, oropharynx without erythema or exudates  Neck:        No Lymphadenopathy or Bruits.  Lungs:     Clear to auscultation bilaterally  CV:          Regular rate and rhythm. No murmurs, rubs or gallops.               Ext:          No clubbing, cyanosis, edema.      Assessment and Plan.   79 y.o. female with the following issues.    1. Cough  Likely viral in etiology. Have recommended over-the-counter pain control " and symptom relief. Patient will followup if spiking fevers or symptoms worsen.  Have given a cough suppressant.

## 2020-01-17 ENCOUNTER — OFFICE VISIT (OUTPATIENT)
Dept: URGENT CARE | Facility: PHYSICIAN GROUP | Age: 80
End: 2020-01-17
Payer: MEDICARE

## 2020-01-17 VITALS
OXYGEN SATURATION: 91 % | HEART RATE: 86 BPM | DIASTOLIC BLOOD PRESSURE: 90 MMHG | SYSTOLIC BLOOD PRESSURE: 160 MMHG | TEMPERATURE: 97.9 F | RESPIRATION RATE: 16 BRPM

## 2020-01-17 DIAGNOSIS — J44.1 COPD EXACERBATION (HCC): ICD-10-CM

## 2020-01-17 DIAGNOSIS — J22 ACUTE RESPIRATORY INFECTION: ICD-10-CM

## 2020-01-17 PROCEDURE — 99214 OFFICE O/P EST MOD 30 MIN: CPT | Performed by: FAMILY MEDICINE

## 2020-01-17 RX ORDER — PREDNISONE 10 MG/1
40 TABLET ORAL DAILY
Qty: 20 TAB | Refills: 0 | Status: SHIPPED | OUTPATIENT
Start: 2020-01-17 | End: 2020-11-09 | Stop reason: SDUPTHER

## 2020-01-17 RX ORDER — DOXYCYCLINE HYCLATE 100 MG
100 TABLET ORAL 2 TIMES DAILY
Qty: 14 TAB | Refills: 0 | Status: SHIPPED | OUTPATIENT
Start: 2020-01-17 | End: 2020-01-24

## 2020-01-17 ASSESSMENT — ENCOUNTER SYMPTOMS
SHORTNESS OF BREATH: 1
VOMITING: 0
EYE PAIN: 0
NAUSEA: 0
COUGH: 1
SORE THROAT: 0
FEVER: 0
CHILLS: 0
MYALGIAS: 0
DIZZINESS: 0

## 2020-01-17 ASSESSMENT — COPD QUESTIONNAIRES: COPD: 1

## 2020-01-18 NOTE — PROGRESS NOTES
Subjective:   [place] Ernst is a 79 y.o. female who presents for Cough (cough x10 days)        79-year-old female with a history of COPD, asthma presents to the urgent care with chief complaint of a cough for the past 10 days.  Patient has experienced intermittent coughing episodes associated with dyspnea.  The patient has been using the rescue inhaler more than usual to 4 times a day and at baseline uses only 1 time a day.  The patient has been compliant with Trelegy which at baseline is effective for decrease use of rescue inhaler.    Patient Active Problem List:     Essential hypertension     Chronic left shoulder pain     Acquired hypothyroidism     Dyslipidemia     Episode of recurrent major depressive disorder (HCC)     Chronic obstructive pulmonary disease (HCC)     Alzheimer's dementia without behavioral disturbance (HCC)     IGT (impaired glucose tolerance)     Vitamin B12 deficiency     Osteoporosis without current pathological fracture     Mixed incontinence     Seizure disorder (HCC)     Atherosclerosis of aorta (HCC)     Autoimmune cerebritis (HCC)     Risk for falls        Cough   This is a new problem. Episode onset: 10 days. The cough is productive of sputum (Phlegm). Associated symptoms include shortness of breath. Pertinent negatives include no chest pain, chills, fever, myalgias, rash or sore throat. She has tried a beta-agonist inhaler (Tessalon) for the symptoms. Her past medical history is significant for asthma and COPD.     Review of Systems   Constitutional: Negative for chills and fever.   HENT: Negative for sore throat.    Eyes: Negative for pain.   Respiratory: Positive for cough and shortness of breath.    Cardiovascular: Negative for chest pain.   Gastrointestinal: Negative for nausea and vomiting.   Musculoskeletal: Negative for myalgias.   Skin: Negative for rash.   Neurological: Negative for dizziness.     No Known Allergies   Objective:   /90 (BP Location: Right arm,  Patient Position: Sitting, BP Cuff Size: Small adult)   Pulse 86   Temp 36.6 °C (97.9 °F) (Temporal)   Resp 16   SpO2 91%   Physical Exam  Vitals signs and nursing note reviewed.   Constitutional:       General: She is not in acute distress.     Appearance: She is well-developed.   HENT:      Head: Normocephalic and atraumatic.      Right Ear: External ear normal.      Left Ear: External ear normal.      Nose: Nose normal.      Mouth/Throat:      Mouth: Mucous membranes are moist.   Eyes:      Conjunctiva/sclera: Conjunctivae normal.      Pupils: Pupils are equal, round, and reactive to light.   Cardiovascular:      Rate and Rhythm: Normal rate and regular rhythm.      Heart sounds: No murmur.   Pulmonary:      Effort: Pulmonary effort is normal. No respiratory distress.      Breath sounds: Wheezing and rhonchi present.   Abdominal:      General: There is no distension.      Palpations: Abdomen is soft.      Tenderness: There is no tenderness.   Musculoskeletal: Normal range of motion.   Skin:     General: Skin is warm and dry.   Neurological:      General: No focal deficit present.      Mental Status: She is alert and oriented to person, place, and time. Mental status is at baseline.      Gait: Gait (gait at baseline) normal.   Psychiatric:         Judgment: Judgment normal.     Medical decision-making/course: The patient remained afebrile, hemodynamically and neurologically stable with no evidence of respiratory compromise throughout the urgent care course.  There was no immediate clinical indication for the necessity of emergency department evaluation or inpatient admission and the patient requested a trial of outpatient management.          Assessment/Plan:   1. COPD exacerbation (HCC)  - predniSONE (DELTASONE) 10 MG Tab; Take 4 Tabs by mouth every day for 5 days.  Dispense: 20 Tab; Refill: 0    2. Acute respiratory infection  - doxycycline (VIBRAMYCIN) 100 MG Tab; Take 1 Tab by mouth 2 times a day for 7  days.  Dispense: 14 Tab; Refill: 0      Discussed close monitoring, return precautions, and supportive measures including maintaining adequate fluid hydration and caloric intake, relative rest and OTC symptom management including acetaminophen as needed for pain and/or fever.    Differential diagnosis, natural history, supportive care, and indications for immediate follow-up discussed.     Advised the patient to follow-up with the primary care physician for recheck, reevaluation, and consideration of further management.

## 2020-01-18 NOTE — PATIENT INSTRUCTIONS
Bronchitis  Bronchitis is a problem of the air tubes leading to your lungs. This problem makes it hard for air to get in and out of the lungs. You may cough a lot because your air tubes are narrow. Going without care can cause lasting (chronic) bronchitis.  HOME CARE   · Drink enough fluids to keep your pee (urine) clear or pale yellow.  · Use a cool mist humidifier.  · Quit smoking if you smoke. If you keep smoking, the bronchitis might not get better.  · Only take medicine as told by your doctor.  GET HELP RIGHT AWAY IF:   · Coughing keeps you awake.  · You start to wheeze.  · You become more sick or weak.  · You have a hard time breathing or get short of breath.  · You cough up blood.  · Coughing lasts more than 2 weeks.  · You have a fever.  · Your baby is older than 3 months with a rectal temperature of 102° F (38.9° C) or higher.  · Your baby is 3 months old or younger with a rectal temperature of 100.4° F (38° C) or higher.  MAKE SURE YOU:  · Understand these instructions.  · Will watch your condition.  · Will get help right away if you are not doing well or get worse.  Document Released: 06/05/2009 Document Revised: 03/11/2013 Document Reviewed: 11/19/2010  [employer]® Patient Information ©2014 [email or web address].  Chronic Obstructive Pulmonary Disease  Chronic obstructive pulmonary disease (COPD) is a common lung problem. In COPD, the flow of air from the lungs is limited. The way your lungs work will probably never return to normal, but there are things you can do to improve your lungs and make yourself feel better. Your doctor may treat your condition with:  · Medicines.  · Oxygen.  · Lung surgery.  · Changes to your diet.  · Rehabilitation. This may involve a team of specialists.  Follow these instructions at home:  · Take all medicines as told by your doctor.  · Avoid medicines or cough syrups that dry up your airway (such as antihistamines) and do not allow you to get rid of thick spit. You do not need to  avoid them if told differently by your doctor.  · If you smoke, stop. Smoking makes the problem worse.  · Avoid being around things that make your breathing worse (like smoke, chemicals, and fumes).  · Use oxygen therapy and therapy to help improve your lungs (pulmonary rehabilitation) if told by your doctor. If you need home oxygen therapy, ask your doctor if you should buy a tool to measure your oxygen level (oximeter).  · Avoid people who have a sickness you can catch (contagious).  · Avoid going outside when it is very hot, cold, or humid.  · Eat healthy foods. Eat smaller meals more often. Rest before meals.  · Stay active, but remember to also rest.  · Make sure to get all the shots (vaccines) your doctor recommends. Ask your doctor if you need a pneumonia shot.  · Learn and use tips on how to relax.  · Learn and use tips on how to control your breathing as told by your doctor. Try:  1. Breathing in (inhaling) through your nose for 1 second. Then, pucker your lips and breath out (exhale) through your lips for 2 seconds.  2. Putting one hand on your belly (abdomen). Breathe in slowly through your nose for 1 second. Your hand on your belly should move out. Pucker your lips and breathe out slowly through your lips. Your hand on your belly should move in as you breathe out.  · Learn and use controlled coughing to clear thick spit from your lungs. The steps are:  1. Lean your head a little forward.  2. Breathe in deeply.  3. Try to hold your breath for 3 seconds.  4. Keep your mouth slightly open while coughing 2 times.  5. Spit any thick spit out into a tissue.  6. Rest and do the steps again 1 or 2 times as needed.  Contact a doctor if:  · You cough up more thick spit than usual.  · There is a change in the color or thickness of the spit.  · It is harder to breathe than usual.  · Your breathing is faster than usual.  Get help right away if:  · You have shortness of breath while resting.  · You have shortness of  breath that stops you from:  ¨ Being able to talk.  ¨ Doing normal activities.  · You chest hurts for longer than 5 minutes.  · Your skin color is more blue than usual.  · Your pulse oximeter shows that you have low oxygen for longer than 5 minutes.  This information is not intended to replace advice given to you by your health care provider. Make sure you discuss any questions you have with your health care provider.  Document Released: 06/05/2009 Document Revised: 05/25/2017 Document Reviewed: 08/14/2014  [employer] Interactive Patient Education © 2017 [employer]

## 2020-01-31 ENCOUNTER — HOSPITAL ENCOUNTER (OUTPATIENT)
Dept: LAB | Facility: MEDICAL CENTER | Age: 80
End: 2020-01-31
Attending: INTERNAL MEDICINE
Payer: MEDICARE

## 2020-01-31 DIAGNOSIS — R73.02 IGT (IMPAIRED GLUCOSE TOLERANCE): ICD-10-CM

## 2020-01-31 DIAGNOSIS — E03.9 ACQUIRED HYPOTHYROIDISM: Chronic | ICD-10-CM

## 2020-01-31 DIAGNOSIS — E78.5 DYSLIPIDEMIA: ICD-10-CM

## 2020-01-31 LAB
ALBUMIN SERPL BCP-MCNC: 4.2 G/DL (ref 3.2–4.9)
ALBUMIN/GLOB SERPL: 1.6 G/DL
ALP SERPL-CCNC: 73 U/L (ref 30–99)
ALT SERPL-CCNC: 16 U/L (ref 2–50)
ANION GAP SERPL CALC-SCNC: 6 MMOL/L (ref 0–11.9)
AST SERPL-CCNC: 19 U/L (ref 12–45)
BILIRUB SERPL-MCNC: 0.8 MG/DL (ref 0.1–1.5)
BUN SERPL-MCNC: 13 MG/DL (ref 8–22)
CALCIUM SERPL-MCNC: 9.9 MG/DL (ref 8.5–10.5)
CHLORIDE SERPL-SCNC: 106 MMOL/L (ref 96–112)
CHOLEST SERPL-MCNC: 144 MG/DL (ref 100–199)
CO2 SERPL-SCNC: 30 MMOL/L (ref 20–33)
CREAT SERPL-MCNC: 0.74 MG/DL (ref 0.5–1.4)
EST. AVERAGE GLUCOSE BLD GHB EST-MCNC: 120 MG/DL
GLOBULIN SER CALC-MCNC: 2.7 G/DL (ref 1.9–3.5)
GLUCOSE SERPL-MCNC: 118 MG/DL (ref 65–99)
HBA1C MFR BLD: 5.8 % (ref 0–5.6)
HDLC SERPL-MCNC: 59 MG/DL
LDLC SERPL CALC-MCNC: 64 MG/DL
POTASSIUM SERPL-SCNC: 4.1 MMOL/L (ref 3.6–5.5)
PROT SERPL-MCNC: 6.9 G/DL (ref 6–8.2)
SODIUM SERPL-SCNC: 142 MMOL/L (ref 135–145)
TRIGL SERPL-MCNC: 103 MG/DL (ref 0–149)
TSH SERPL DL<=0.005 MIU/L-ACNC: 3.25 UIU/ML (ref 0.38–5.33)

## 2020-01-31 PROCEDURE — 80053 COMPREHEN METABOLIC PANEL: CPT

## 2020-01-31 PROCEDURE — 80061 LIPID PANEL: CPT

## 2020-01-31 PROCEDURE — 83036 HEMOGLOBIN GLYCOSYLATED A1C: CPT

## 2020-01-31 PROCEDURE — 36415 COLL VENOUS BLD VENIPUNCTURE: CPT

## 2020-01-31 PROCEDURE — 84443 ASSAY THYROID STIM HORMONE: CPT

## 2020-02-12 PROBLEM — G05.3 AUTOIMMUNE CEREBRITIS (HCC): Status: RESOLVED | Noted: 2019-04-11 | Resolved: 2020-02-12

## 2020-02-12 PROBLEM — M35.9 AUTOIMMUNE CEREBRITIS (HCC): Status: RESOLVED | Noted: 2019-04-11 | Resolved: 2020-02-12

## 2020-02-12 RX ORDER — FOLIC ACID 1 MG/1
1 TABLET ORAL DAILY
Qty: 90 TAB | Refills: 3 | Status: SHIPPED | OUTPATIENT
Start: 2020-02-12 | End: 2021-02-11 | Stop reason: SDUPTHER

## 2020-02-13 ENCOUNTER — OFFICE VISIT (OUTPATIENT)
Dept: MEDICAL GROUP | Facility: MEDICAL CENTER | Age: 80
End: 2020-02-13
Payer: MEDICARE

## 2020-02-13 VITALS
WEIGHT: 174 LBS | HEART RATE: 67 BPM | DIASTOLIC BLOOD PRESSURE: 60 MMHG | SYSTOLIC BLOOD PRESSURE: 114 MMHG | HEIGHT: 65 IN | OXYGEN SATURATION: 94 % | BODY MASS INDEX: 28.99 KG/M2 | TEMPERATURE: 97.3 F

## 2020-02-13 DIAGNOSIS — G30.1 LATE ONSET ALZHEIMER'S DISEASE WITHOUT BEHAVIORAL DISTURBANCE (HCC): ICD-10-CM

## 2020-02-13 DIAGNOSIS — J44.9 CHRONIC OBSTRUCTIVE PULMONARY DISEASE, UNSPECIFIED COPD TYPE (HCC): ICD-10-CM

## 2020-02-13 DIAGNOSIS — F02.80 LATE ONSET ALZHEIMER'S DISEASE WITHOUT BEHAVIORAL DISTURBANCE (HCC): ICD-10-CM

## 2020-02-13 DIAGNOSIS — I70.0 ATHEROSCLEROSIS OF AORTA (HCC): ICD-10-CM

## 2020-02-13 DIAGNOSIS — R73.02 IGT (IMPAIRED GLUCOSE TOLERANCE): ICD-10-CM

## 2020-02-13 DIAGNOSIS — E03.9 ACQUIRED HYPOTHYROIDISM: Chronic | ICD-10-CM

## 2020-02-13 DIAGNOSIS — F33.9 EPISODE OF RECURRENT MAJOR DEPRESSIVE DISORDER, UNSPECIFIED DEPRESSION EPISODE SEVERITY (HCC): ICD-10-CM

## 2020-02-13 PROBLEM — G40.909 SEIZURE DISORDER (HCC): Status: RESOLVED | Noted: 2019-02-12 | Resolved: 2020-02-13

## 2020-02-13 PROCEDURE — 99214 OFFICE O/P EST MOD 30 MIN: CPT | Performed by: INTERNAL MEDICINE

## 2020-02-13 PROCEDURE — 8041 PR SCP AHA: Performed by: INTERNAL MEDICINE

## 2020-02-19 RX ORDER — LEVOTHYROXINE SODIUM 0.12 MG/1
125 TABLET ORAL
Qty: 90 TAB | Refills: 3 | Status: SHIPPED | OUTPATIENT
Start: 2020-02-19 | End: 2021-02-11 | Stop reason: SDUPTHER

## 2020-03-18 DIAGNOSIS — E66.09 NON MORBID OBESITY DUE TO EXCESS CALORIES: ICD-10-CM

## 2020-03-18 DIAGNOSIS — M81.0 OSTEOPOROSIS WITHOUT CURRENT PATHOLOGICAL FRACTURE, UNSPECIFIED OSTEOPOROSIS TYPE: ICD-10-CM

## 2020-03-18 RX ORDER — ALENDRONATE SODIUM 70 MG/1
TABLET ORAL
Qty: 12 TAB | Refills: 1 | Status: SHIPPED | OUTPATIENT
Start: 2020-03-18 | End: 2020-07-24

## 2020-03-19 ENCOUNTER — HOSPITAL ENCOUNTER (OUTPATIENT)
Dept: LAB | Facility: MEDICAL CENTER | Age: 80
End: 2020-03-19
Payer: MEDICARE

## 2020-03-19 ENCOUNTER — OFFICE VISIT (OUTPATIENT)
Dept: NEUROLOGY | Facility: MEDICAL CENTER | Age: 80
End: 2020-03-19
Payer: MEDICARE

## 2020-03-19 VITALS
SYSTOLIC BLOOD PRESSURE: 132 MMHG | HEIGHT: 65 IN | DIASTOLIC BLOOD PRESSURE: 60 MMHG | TEMPERATURE: 97.6 F | OXYGEN SATURATION: 93 % | BODY MASS INDEX: 28.36 KG/M2 | RESPIRATION RATE: 16 BRPM | HEART RATE: 72 BPM | WEIGHT: 170.19 LBS
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
## 2020-03-19 DIAGNOSIS — R26.89 BALANCE DISORDER: ICD-10-CM

## 2020-03-19 DIAGNOSIS — G30.9 ALZHEIMER'S DISEASE, UNSPECIFIED (CODE) (HCC): ICD-10-CM

## 2020-03-19 LAB
FOLATE SERPL-MCNC: >40 NG/ML
VIT B12 SERPL-MCNC: 3052 PG/ML (ref 211–911)

## 2020-03-19 PROCEDURE — 99214 OFFICE O/P EST MOD 30 MIN: CPT

## 2020-03-19 PROCEDURE — 82607 VITAMIN B-12: CPT

## 2020-03-19 PROCEDURE — 82746 ASSAY OF FOLIC ACID SERUM: CPT

## 2020-03-19 PROCEDURE — 36415 COLL VENOUS BLD VENIPUNCTURE: CPT

## 2020-03-19 NOTE — PROGRESS NOTES
Dementia     Getting angry a lot more at her family.  Most of the days she is angry and has outbursts.  levetiracetam hat dr molina prescribed did not help her at all.  She would periodically forget her granddaughter and her name and she would still recognize her but not all the time.  She lives with her son and his wife.  Does her own laundry can cook   Short term memory loss is the main problem and this is quite advanced now.      MRI brain 2017 reviewed and diffiuse atrophy constistent with alz disease     oliver made her hallucinate     On celexa 20 mg she ran out of it once and she became agitated    Review of Systems   Constitutional: Positive for malaise/fatigue.   HENT: Negative.    Eyes: Negative.    Respiratory: Negative.    Cardiovascular: Negative.    Gastrointestinal: Negative.    Genitourinary: Negative.    Skin: Negative.    Neurological: Negative.    Endo/Heme/Allergies: Negative.    Psychiatric/Behavioral: Positive for depression and memory loss. The patient is nervous/anxious.      Past Medical History:   Diagnosis Date   • Arthritis     hands   • Asthma     inhalers   • Breath shortness    • COPD (chronic obstructive pulmonary disease) ([facility])    • Dental disorder     partial-doesnt wear   • Disorder of thyroid    • Emphysema of lung ([facility])    • High cholesterol    • Hypertension    • Pain     feet     Past Surgical History:   Procedure Laterality Date   • TEMPORAL ARTERY BIOPSY Left 2018    Procedure: TEMPORAL ARTERY BIOPSY;  Surgeon: Orestes Mina M.D.;  Location: SURGERY [facility];  Service: General   • GYN SURGERY         • HYSTERECTOMY, TOTAL ABDOMINAL       Family History   Problem Relation Age of Onset   • Stroke Mother    • No Known Problems Father    • Heart Disease Brother      Social History     Socioeconomic History   • Marital status:      Spouse name: Not on file   • Number of children: Not on file   • Years of education: Not on file   • Highest  education level: Not on file   Occupational History   • Not on file   Social Needs   • Financial resource strain: Not on file   • Food insecurity     Worry: Not on file     Inability: Not on file   • Transportation needs     Medical: Not on file     Non-medical: Not on file   Tobacco Use   • Smoking status: Never Smoker   • Smokeless tobacco: Never Used   Substance and Sexual Activity   • Alcohol use: No   • Drug use: No   • Sexual activity: Yes     Partners: Male   Lifestyle   • Physical activity     Days per week: Not on file     Minutes per session: Not on file   • Stress: Not on file   Relationships   • Social connections     Talks on phone: Not on file     Gets together: Not on file     Attends Anglican service: Not on file     Active member of club or organization: Not on file     Attends meetings of clubs or organizations: Not on file     Relationship status: Not on file   • Intimate partner violence     Fear of current or ex partner: Not on file     Emotionally abused: Not on file     Physically abused: Not on file     Forced sexual activity: Not on file   Other Topics Concern   • Not on file   Social History Narrative   • Not on file     Current Outpatient Medications on File Prior to Visit   Medication Sig Dispense Refill   • levothyroxine (SYNTHROID) 125 MCG Tab Take 1 Tab by mouth Every morning on an empty stomach. 90 Tab 3   • folic acid (FOLVITE) 1 MG Tab Take 1 Tab by mouth every day. 90 Tab 3   • amLODIPine (NORVASC) 10 MG Tab Take 10 mg by mouth.  3   • citalopram (CELEXA) 20 MG Tab TAKE 1 TABLET BY MOUTH ONCE DAILY 90 Tab 0   • pravastatin (PRAVACHOL) 40 MG tablet TAKE ONE TABLET BY MOUTH ONCE DAILY 90 Tab 3   • Fluticasone-Umeclidin-Vilant (TRELEGY ELLIPTA) 100-62.5-25 MCG/INH AEROSOL POWDER, BREATH ACTIVATED Inhale 1 Puff by mouth every day. 1 Each 11   • losartan (COZAAR) 100 MG Tab Take 1 Tab by mouth every day. 90 Tab 3   • levETIRAcetam (KEPPRA) 250 MG tablet Take 1 Tab by mouth 2 times a  day. 180 Tab 2   • memantine (NAMENDA) 5 MG Tab Take 1 Tab by mouth 2 times a day. TWICE DAILY 180 Tab 2   • albuterol (PROVENTIL) 2.5mg/3ml Nebu Soln solution for nebulization USE 1 VIAL IN NEBULIZER EVERY 4 HOURS AS NEEDED FOR SHORTNESS OF BREATH 300 Bullet 6   • metoprolol SR (TOPROL XL) 100 MG TABLET SR 24 HR TAKE ONE TABLET BY MOUTH ONCE DAILY 90 Tab 3   • alendronate (FOSAMAX) 70 MG Tab Take 70 mg by mouth every 7 days. Sunday     • acetaminophen (TYLENOL) 500 MG Tab Take 1,000 mg by mouth every 6 hours as needed for Moderate Pain. 30 Tab 0   • alendronate (FOSAMAX) 70 MG Tab Take 1 tablet by mouth once a week 12 Tab 1   • PROAIR  (90 Base) MCG/ACT Aero Soln inhalation aerosol INHALE TWO PUFFS BY MOUTH EVERY 6 HOURS AS NEEDED FOR SHORTNESS OF BREATH (Patient not taking: Reported on 3/19/2020) 3 Inhaler 3   • Diclofenac Sodium (VOLTAREN) 1 % Gel Apply 1 g to skin as directed 4 times a day. (Patient not taking: Reported on 3/19/2020) 5 Tube 6     No current facility-administered medications on file prior to visit.    No Known Allergies     Family History   Problem Relation Age of Onset   • Stroke Mother    • No Known Problems Father    • Heart Disease Brother      Social History     Socioeconomic History   • Marital status:      Spouse name: Not on file   • Number of children: Not on file   • Years of education: Not on file   • Highest education level: Not on file   Occupational History   • Not on file   Social Needs   • Financial resource strain: Not on file   • Food insecurity     Worry: Not on file     Inability: Not on file   • Transportation needs     Medical: Not on file     Non-medical: Not on file   Tobacco Use   • Smoking status: Never Smoker   • Smokeless tobacco: Never Used   Substance and Sexual Activity   • Alcohol use: No   • Drug use: No   • Sexual activity: Yes     Partners: Male   Lifestyle   • Physical activity     Days per week: Not on file     Minutes per session: Not on file  "  • Stress: Not on file   Relationships   • Social connections     Talks on phone: Not on file     Gets together: Not on file     Attends Bahai service: Not on file     Active member of club or organization: Not on file     Attends meetings of clubs or organizations: Not on file     Relationship status: Not on file   • Intimate partner violence     Fear of current or ex partner: Not on file     Emotionally abused: Not on file     Physically abused: Not on file     Forced sexual activity: Not on file   Other Topics Concern   • Not on file   Social History Narrative   • Not on file     Encounter Vitals  Standard Vitals  Vitals  Blood Pressure : 132/60  Temperature: 36.4 °C (97.6 °F)  Temp src: Temporal  Pulse: 72  Respiration: 16  Pulse Oximetry: 93 %  Height: 165.1 cm (5' 5\")  Weight: 77.2 kg (170 lb 3.1 oz)  Encounter Vitals  Temperature: 36.4 °C (97.6 °F)  Temp src: Temporal  Blood Pressure : 132/60  Pulse: 72  Respiration: 16  Pulse Oximetry: 93 %  Weight: 77.2 kg (170 lb 3.1 oz)  Height: 165.1 cm (5' 5\")  BMI (Calculated): 28.32  Pulmonary-Specific Vitals     Durable Medical Equipment-Specific Vitals     Physical Exam  Constitutional:       Appearance: She is normal weight.   HENT:      Head: Normocephalic and atraumatic.      Right Ear: External ear normal.      Left Ear: External ear normal.      Nose: Nose normal.      Mouth/Throat:      Mouth: Mucous membranes are moist.      Pharynx: Oropharynx is clear.   Eyes:      Extraocular Movements: Extraocular movements intact.      Conjunctiva/sclera: Conjunctivae normal.      Pupils: Pupils are equal, round, and reactive to light.   Neck:      Musculoskeletal: Normal range of motion and neck supple.   Cardiovascular:      Rate and Rhythm: Normal rate and regular rhythm.      Pulses: Normal pulses.      Heart sounds: Normal heart sounds.   Pulmonary:      Effort: Pulmonary effort is normal.      Breath sounds: Normal breath sounds.   Abdominal:      General: " Abdomen is flat. Bowel sounds are normal.      Palpations: Abdomen is soft.   Musculoskeletal: Normal range of motion.   Skin:     General: Skin is warm.   Neurological:      General: No focal deficit present.      Mental Status: She is alert. Mental status is at baseline.      Cranial Nerves: No cranial nerve deficit.      Sensory: No sensory deficit.      Motor: No weakness.      Coordination: Coordination normal.      Gait: Gait abnormal.      Deep Tendon Reflexes: Reflexes normal.   Psychiatric:         Mood and Affect: Mood normal.     No Known Allergies       Lab Results   Component Value Date/Time    SODIUM 142 01/31/2020 10:34 AM    POTASSIUM 4.1 01/31/2020 10:34 AM    CHLORIDE 106 01/31/2020 10:34 AM    CO2 30 01/31/2020 10:34 AM    GLUCOSE 118 (H) 01/31/2020 10:34 AM    BUN 13 01/31/2020 10:34 AM    CREATININE 0.74 01/31/2020 10:34 AM      Lab Results   Component Value Date/Time    WBC 11.0 (H) 02/08/2017 09:30 AM    RBC 4.60 02/08/2017 09:30 AM    HEMOGLOBIN 14.1 02/08/2017 09:30 AM    HEMATOCRIT 44.8 02/08/2017 09:30 AM    MCV 97.4 02/08/2017 09:30 AM    MCH 30.7 02/08/2017 09:30 AM    MCHC 31.5 (L) 02/08/2017 09:30 AM    MPV 11.5 02/08/2017 09:30 AM    NEUTSPOLYS 63.80 02/08/2017 09:30 AM    LYMPHOCYTES 21.00 (L) 02/08/2017 09:30 AM    MONOCYTES 8.70 02/08/2017 09:30 AM    EOSINOPHILS 5.20 02/08/2017 09:30 AM    BASOPHILS 0.80 02/08/2017 09:30 AM        Imaging   Reviewed  Mri brain   2017  IMPRESSION:     1.  Mild diffuse atrophy in a nonspecific pattern  2.  Mild to moderate white matter changes  3.  Motion degraded exam  4.  Incompletely imaged cervical spondylosis with at least moderate central canal narrowing at C3-C4.    Impression and plan     Dementia, AD suspected   PET scan  Neuropsychological testing   PT for gait and falls assessment   Check B12, folate   EEG routine awake and asleep     RTC 6 months

## 2020-03-24 ASSESSMENT — ENCOUNTER SYMPTOMS
RESPIRATORY NEGATIVE: 1
EYES NEGATIVE: 1
DEPRESSION: 1
CARDIOVASCULAR NEGATIVE: 1
GASTROINTESTINAL NEGATIVE: 1
MEMORY LOSS: 1
NEUROLOGICAL NEGATIVE: 1
NERVOUS/ANXIOUS: 1

## 2020-04-01 RX ORDER — CITALOPRAM 20 MG/1
TABLET ORAL
Qty: 90 TAB | Refills: 0 | Status: SHIPPED | OUTPATIENT
Start: 2020-04-01 | End: 2020-07-01

## 2020-04-01 NOTE — TELEPHONE ENCOUNTER
Received request via: Pharmacy    Was the patient seen in the last year in this department? Yes    Does the patient have an active prescription (recently filled or refills available) for medication(s) requested? No  
home

## 2020-04-08 RX ORDER — AMLODIPINE BESYLATE 10 MG/1
TABLET ORAL
Qty: 90 TAB | Refills: 0 | Status: SHIPPED | OUTPATIENT
Start: 2020-04-08 | End: 2020-07-08

## 2020-04-24 ENCOUNTER — HOSPITAL ENCOUNTER (OUTPATIENT)
Dept: RADIOLOGY | Facility: MEDICAL CENTER | Age: 80
End: 2020-04-24
Payer: MEDICARE

## 2020-04-24 DIAGNOSIS — G30.9 ALZHEIMER'S DISEASE, UNSPECIFIED (CODE) (HCC): ICD-10-CM

## 2020-04-24 PROCEDURE — A9552 F18 FDG: HCPCS

## 2020-05-08 RX ORDER — ALBUTEROL SULFATE 2.5 MG/3ML
SOLUTION RESPIRATORY (INHALATION)
Qty: 300 ML | Refills: 0 | Status: SHIPPED | OUTPATIENT
Start: 2020-05-08 | End: 2020-08-06 | Stop reason: SDUPTHER

## 2020-05-21 RX ORDER — MEMANTINE HYDROCHLORIDE 10 MG/1
5 TABLET ORAL 2 TIMES DAILY
Qty: 90 TAB | Refills: 3 | Status: SHIPPED | OUTPATIENT
Start: 2020-05-21 | End: 2020-05-27

## 2020-05-27 RX ORDER — MEMANTINE HYDROCHLORIDE 10 MG/1
10 TABLET ORAL 2 TIMES DAILY
Qty: 90 TAB | Refills: 3 | Status: SHIPPED | OUTPATIENT
Start: 2020-05-27 | End: 2021-02-11 | Stop reason: SDUPTHER

## 2020-06-18 RX ORDER — LOSARTAN POTASSIUM 100 MG/1
100 TABLET ORAL DAILY
Qty: 90 TAB | Refills: 3 | Status: SHIPPED | OUTPATIENT
Start: 2020-06-18 | End: 2020-08-07

## 2020-07-01 RX ORDER — CITALOPRAM 20 MG/1
TABLET ORAL
Qty: 90 TAB | Refills: 0 | Status: SHIPPED | OUTPATIENT
Start: 2020-07-01 | End: 2020-07-08

## 2020-07-08 RX ORDER — CITALOPRAM 20 MG/1
TABLET ORAL
Qty: 90 TAB | Refills: 0 | Status: SHIPPED | OUTPATIENT
Start: 2020-07-08 | End: 2020-08-07 | Stop reason: SDUPTHER

## 2020-07-08 RX ORDER — AMLODIPINE BESYLATE 10 MG/1
TABLET ORAL
Qty: 90 TAB | Refills: 0 | Status: SHIPPED | OUTPATIENT
Start: 2020-07-08 | End: 2020-10-08 | Stop reason: SDUPTHER

## 2020-07-24 ENCOUNTER — OFFICE VISIT (OUTPATIENT)
Dept: MEDICAL GROUP | Facility: MEDICAL CENTER | Age: 80
End: 2020-07-24
Payer: MEDICARE

## 2020-07-24 VITALS
DIASTOLIC BLOOD PRESSURE: 60 MMHG | SYSTOLIC BLOOD PRESSURE: 136 MMHG | TEMPERATURE: 97.7 F | HEART RATE: 66 BPM | WEIGHT: 165.24 LBS | BODY MASS INDEX: 27.53 KG/M2 | HEIGHT: 65 IN | OXYGEN SATURATION: 93 %

## 2020-07-24 DIAGNOSIS — R73.02 IGT (IMPAIRED GLUCOSE TOLERANCE): ICD-10-CM

## 2020-07-24 DIAGNOSIS — E03.9 ACQUIRED HYPOTHYROIDISM: Chronic | ICD-10-CM

## 2020-07-24 DIAGNOSIS — R10.13 EPIGASTRIC PAIN: ICD-10-CM

## 2020-07-24 PROCEDURE — 99214 OFFICE O/P EST MOD 30 MIN: CPT | Performed by: INTERNAL MEDICINE

## 2020-07-24 RX ORDER — OMEPRAZOLE 40 MG/1
40 CAPSULE, DELAYED RELEASE ORAL DAILY
Qty: 90 CAP | Refills: 3 | Status: SHIPPED | OUTPATIENT
Start: 2020-07-24 | End: 2021-02-11 | Stop reason: SDUPTHER

## 2020-07-24 NOTE — PROGRESS NOTES
CC: Abdominal pains, hypothyroid, blood sugars.                                                                                                                                      HPI:   [place] presents today with the following.    1. Epigastric pain  Complaining of abdominal pain today son that is with her reports most of the history as she has rather advanced dementia.  Pain is in the epigastrium there is been no nausea or vomiting no reports of changes in bowel habits.  No fevers or chills.  She is maintained on Fosamax for 3 years.    2. Acquired hypothyroidism  Maintain on medication due for recheck of thyroid    3. IGT (impaired glucose tolerance)  So due for blood work for blood sugars.      Patient Active Problem List    Diagnosis Date Noted   • Risk for falls 08/13/2019   • Atherosclerosis of aorta (HCC) 02/12/2019   • Mixed incontinence 08/14/2018   • Osteoporosis without current pathological fracture 06/30/2017   • Vitamin B12 deficiency 04/21/2017   • IGT (impaired glucose tolerance) 04/03/2017   • Alzheimer's dementia without behavioral disturbance (HCC) 02/08/2017   • Essential hypertension 01/25/2017   • Chronic left shoulder pain 01/25/2017   • Acquired hypothyroidism 01/25/2017   • Dyslipidemia 01/25/2017   • Episode of recurrent major depressive disorder (HCC) 01/25/2017   • Chronic obstructive pulmonary disease (HCC) 01/25/2017       Current Outpatient Medications   Medication Sig Dispense Refill   • Fluticasone-Umeclidin-Vilant (TRELEGY ELLIPTA) 100-62.5-25 MCG/INH AEROSOL POWDER, BREATH ACTIVATED Inhale 1 Puff by mouth every day. 1 Each 11   • omeprazole (PRILOSEC) 40 MG delayed-release capsule Take 1 Cap by mouth every day. 90 Cap 3   • citalopram (CELEXA) 20 MG Tab Take 1 tablet by mouth once daily 90 Tab 0   • amLODIPine (NORVASC) 10 MG Tab Take 1 tablet by mouth once daily 90 Tab 0   • losartan (COZAAR) 100 MG Tab Take 1 Tab by mouth every day. 90 Tab 3   • memantine (NAMENDA) 10 MG Tab  "Take 1 Tab by mouth 2 times a day. 90 Tab 3   • albuterol (PROVENTIL) 2.5mg/3ml Nebu Soln solution for nebulization USE 1 VIAL IN NEBULIZER EVERY 4 HOURS AS NEEDED FOR SHORTNESS OF BREATH 300 mL 0   • levothyroxine (SYNTHROID) 125 MCG Tab Take 1 Tab by mouth Every morning on an empty stomach. 90 Tab 3   • folic acid (FOLVITE) 1 MG Tab Take 1 Tab by mouth every day. 90 Tab 3   • pravastatin (PRAVACHOL) 40 MG tablet TAKE ONE TABLET BY MOUTH ONCE DAILY 90 Tab 3   • metoprolol SR (TOPROL XL) 100 MG TABLET SR 24 HR TAKE ONE TABLET BY MOUTH ONCE DAILY 90 Tab 3   • acetaminophen (TYLENOL) 500 MG Tab Take 1,000 mg by mouth every 6 hours as needed for Moderate Pain. 30 Tab 0     No current facility-administered medications for this visit.          Allergies as of 07/24/2020   • (No Known Allergies)        ROS: Denies Chest pain, SOB, LE edema.    /60 (BP Location: Left arm, Patient Position: Sitting)   Pulse 66   Temp 36.5 °C (97.7 °F)   Ht 1.651 m (5' 5\")   Wt 75 kg (165 lb 3.8 oz)   SpO2 93%   BMI 27.50 kg/m²     Physical Exam:  Gen:         Alert and oriented, No apparent distress.  Neck:        No Lymphadenopathy or Bruits.  Lungs:     Clear to auscultation bilaterally  CV:          Regular rate and rhythm. No murmurs, rubs or gallops.     abd pain   soft mild tender in the epigastrium and no rebound.  Normal active bowel sounds no hepatosplenomegaly.            Ext:          No clubbing, cyanosis, edema.      Assessment and Plan.   80 y.o. female with the following issues.    1. Epigastric pain  Exam does reveal abdominal pain epigastrium discontinuing alendronate starting on a PPI.  Have written for ultrasound given ER precautions.  We will see back in 1 week to check on pain.  - CBC WITH DIFFERENTIAL; Future  - LIPASE; Future  - US-ABDOMEN COMPLETE SURVEY; Future  - Comp Metabolic Panel; Future    2. Acquired hypothyroidism  Checking thyroid along with abdominal work-up.  - TSH; Future    3. IGT (impaired " glucose tolerance)  Blood sugars.  - HEMOGLOBIN A1C; Future

## 2020-07-28 RX ORDER — PRAVASTATIN SODIUM 40 MG
TABLET ORAL
Qty: 100 TAB | Refills: 3 | Status: SHIPPED | OUTPATIENT
Start: 2020-07-28 | End: 2020-08-20 | Stop reason: SDUPTHER

## 2020-07-29 ENCOUNTER — HOSPITAL ENCOUNTER (OUTPATIENT)
Dept: RADIOLOGY | Facility: MEDICAL CENTER | Age: 80
End: 2020-07-29
Attending: INTERNAL MEDICINE
Payer: MEDICARE

## 2020-07-29 DIAGNOSIS — R10.13 EPIGASTRIC PAIN: ICD-10-CM

## 2020-07-29 PROCEDURE — 76700 US EXAM ABDOM COMPLETE: CPT

## 2020-07-30 ENCOUNTER — HOSPITAL ENCOUNTER (OUTPATIENT)
Dept: LAB | Facility: MEDICAL CENTER | Age: 80
End: 2020-07-30
Attending: INTERNAL MEDICINE
Payer: MEDICARE

## 2020-07-30 DIAGNOSIS — R10.13 EPIGASTRIC PAIN: ICD-10-CM

## 2020-07-30 DIAGNOSIS — R73.02 IGT (IMPAIRED GLUCOSE TOLERANCE): ICD-10-CM

## 2020-07-30 DIAGNOSIS — Z11.1 SCREENING-PULMONARY TB: ICD-10-CM

## 2020-07-30 DIAGNOSIS — E03.9 ACQUIRED HYPOTHYROIDISM: Chronic | ICD-10-CM

## 2020-07-30 LAB
ALBUMIN SERPL BCP-MCNC: 4.3 G/DL (ref 3.2–4.9)
ALBUMIN/GLOB SERPL: 1.7 G/DL
ALP SERPL-CCNC: 95 U/L (ref 30–99)
ALT SERPL-CCNC: 17 U/L (ref 2–50)
ANION GAP SERPL CALC-SCNC: 13 MMOL/L (ref 7–16)
AST SERPL-CCNC: 20 U/L (ref 12–45)
BASOPHILS # BLD AUTO: 0.9 % (ref 0–1.8)
BASOPHILS # BLD: 0.1 K/UL (ref 0–0.12)
BILIRUB SERPL-MCNC: 0.5 MG/DL (ref 0.1–1.5)
BUN SERPL-MCNC: 16 MG/DL (ref 8–22)
CALCIUM SERPL-MCNC: 9.6 MG/DL (ref 8.5–10.5)
CHLORIDE SERPL-SCNC: 99 MMOL/L (ref 96–112)
CO2 SERPL-SCNC: 23 MMOL/L (ref 20–33)
CREAT SERPL-MCNC: 0.69 MG/DL (ref 0.5–1.4)
EOSINOPHIL # BLD AUTO: 0.37 K/UL (ref 0–0.51)
EOSINOPHIL NFR BLD: 3.5 % (ref 0–6.9)
ERYTHROCYTE [DISTWIDTH] IN BLOOD BY AUTOMATED COUNT: 44.7 FL (ref 35.9–50)
EST. AVERAGE GLUCOSE BLD GHB EST-MCNC: 108 MG/DL
FASTING STATUS PATIENT QL REPORTED: NORMAL
GLOBULIN SER CALC-MCNC: 2.5 G/DL (ref 1.9–3.5)
GLUCOSE SERPL-MCNC: 126 MG/DL (ref 65–99)
HBA1C MFR BLD: 5.4 % (ref 0–5.6)
HCT VFR BLD AUTO: 40.5 % (ref 37–47)
HGB BLD-MCNC: 13.2 G/DL (ref 12–16)
IMM GRANULOCYTES # BLD AUTO: 0.02 K/UL (ref 0–0.11)
IMM GRANULOCYTES NFR BLD AUTO: 0.2 % (ref 0–0.9)
LIPASE SERPL-CCNC: 30 U/L (ref 11–82)
LYMPHOCYTES # BLD AUTO: 2.38 K/UL (ref 1–4.8)
LYMPHOCYTES NFR BLD: 22.3 % (ref 22–41)
MCH RBC QN AUTO: 31.8 PG (ref 27–33)
MCHC RBC AUTO-ENTMCNC: 32.6 G/DL (ref 33.6–35)
MCV RBC AUTO: 97.6 FL (ref 81.4–97.8)
MONOCYTES # BLD AUTO: 0.82 K/UL (ref 0–0.85)
MONOCYTES NFR BLD AUTO: 7.7 % (ref 0–13.4)
NEUTROPHILS # BLD AUTO: 6.97 K/UL (ref 2–7.15)
NEUTROPHILS NFR BLD: 65.4 % (ref 44–72)
NRBC # BLD AUTO: 0 K/UL
NRBC BLD-RTO: 0 /100 WBC
PLATELET # BLD AUTO: 245 K/UL (ref 164–446)
PMV BLD AUTO: 10.9 FL (ref 9–12.9)
POTASSIUM SERPL-SCNC: 4 MMOL/L (ref 3.6–5.5)
PROT SERPL-MCNC: 6.8 G/DL (ref 6–8.2)
RBC # BLD AUTO: 4.15 M/UL (ref 4.2–5.4)
SODIUM SERPL-SCNC: 135 MMOL/L (ref 135–145)
TSH SERPL DL<=0.005 MIU/L-ACNC: 2.36 UIU/ML (ref 0.38–5.33)
WBC # BLD AUTO: 10.7 K/UL (ref 4.8–10.8)

## 2020-07-30 PROCEDURE — 85025 COMPLETE CBC W/AUTO DIFF WBC: CPT

## 2020-07-30 PROCEDURE — 86480 TB TEST CELL IMMUN MEASURE: CPT

## 2020-07-30 PROCEDURE — 83690 ASSAY OF LIPASE: CPT

## 2020-07-30 PROCEDURE — 84443 ASSAY THYROID STIM HORMONE: CPT

## 2020-07-30 PROCEDURE — 83036 HEMOGLOBIN GLYCOSYLATED A1C: CPT

## 2020-07-30 PROCEDURE — 80053 COMPREHEN METABOLIC PANEL: CPT

## 2020-07-30 PROCEDURE — 36415 COLL VENOUS BLD VENIPUNCTURE: CPT

## 2020-08-03 LAB
GAMMA INTERFERON BACKGROUND BLD IA-ACNC: 0.02 IU/ML
M TB IFN-G BLD-IMP: NEGATIVE
M TB IFN-G CD4+ BCKGRND COR BLD-ACNC: 0 IU/ML
MITOGEN IGNF BCKGRD COR BLD-ACNC: >10 IU/ML
QFT TB2 - NIL TBQ2: 0 IU/ML

## 2020-08-06 ENCOUNTER — PATIENT MESSAGE (OUTPATIENT)
Dept: MEDICAL GROUP | Facility: MEDICAL CENTER | Age: 80
End: 2020-08-06

## 2020-08-06 RX ORDER — ALBUTEROL SULFATE 90 UG/1
2 AEROSOL, METERED RESPIRATORY (INHALATION) EVERY 4 HOURS PRN
Qty: 8.5 G | Refills: 11 | Status: SHIPPED | OUTPATIENT
Start: 2020-08-06

## 2020-08-06 RX ORDER — ALBUTEROL SULFATE 2.5 MG/3ML
2.5 SOLUTION RESPIRATORY (INHALATION) EVERY 4 HOURS PRN
Qty: 300 ML | Refills: 0 | Status: SHIPPED | OUTPATIENT
Start: 2020-08-06 | End: 2020-10-01 | Stop reason: SDUPTHER

## 2020-08-07 ENCOUNTER — OFFICE VISIT (OUTPATIENT)
Dept: MEDICAL GROUP | Facility: MEDICAL CENTER | Age: 80
End: 2020-08-07
Payer: MEDICARE

## 2020-08-07 VITALS
BODY MASS INDEX: 28.16 KG/M2 | SYSTOLIC BLOOD PRESSURE: 138 MMHG | TEMPERATURE: 97.8 F | HEART RATE: 68 BPM | WEIGHT: 169 LBS | OXYGEN SATURATION: 96 % | DIASTOLIC BLOOD PRESSURE: 72 MMHG | HEIGHT: 65 IN

## 2020-08-07 DIAGNOSIS — J44.9 CHRONIC OBSTRUCTIVE PULMONARY DISEASE, UNSPECIFIED COPD TYPE (HCC): ICD-10-CM

## 2020-08-07 DIAGNOSIS — R10.13 DYSPEPSIA: ICD-10-CM

## 2020-08-07 PROCEDURE — 99213 OFFICE O/P EST LOW 20 MIN: CPT | Performed by: INTERNAL MEDICINE

## 2020-08-07 RX ORDER — CITALOPRAM 20 MG/1
20 TABLET ORAL DAILY
Qty: 90 TAB | Refills: 3 | Status: SHIPPED | OUTPATIENT
Start: 2020-08-07 | End: 2020-09-23 | Stop reason: SDUPTHER

## 2020-08-07 RX ORDER — METOPROLOL SUCCINATE 100 MG/1
100 TABLET, EXTENDED RELEASE ORAL DAILY
Qty: 90 TAB | Refills: 3 | Status: SHIPPED | OUTPATIENT
Start: 2020-08-07 | End: 2021-02-11 | Stop reason: SDUPTHER

## 2020-08-07 ASSESSMENT — FIBROSIS 4 INDEX: FIB4 SCORE: 1.62

## 2020-08-07 NOTE — PROGRESS NOTES
CC: Follow-up dyspepsia                                                                                                                                      HPI:   [place] presents today with the following.    1. Dyspepsia  Patient seen last week with dyspepsia on Fosamax.  Stop medication placed on PPI.  Weight has gone up slightly since last visit she is doing incredibly better per her symptoms reported.  Denies any nausea vomiting no blood in stool or dark stool.        Patient Active Problem List    Diagnosis Date Noted   • Risk for falls 08/13/2019   • Atherosclerosis of aorta (HCC) 02/12/2019   • Mixed incontinence 08/14/2018   • Osteoporosis without current pathological fracture 06/30/2017   • Vitamin B12 deficiency 04/21/2017   • IGT (impaired glucose tolerance) 04/03/2017   • Alzheimer's dementia without behavioral disturbance ([facility]) 02/08/2017   • Essential hypertension 01/25/2017   • Chronic left shoulder pain 01/25/2017   • Acquired hypothyroidism 01/25/2017   • Dyslipidemia 01/25/2017   • Episode of recurrent major depressive disorder ([facility]) 01/25/2017   • Chronic obstructive pulmonary disease ([facility]) 01/25/2017       Current Outpatient Medications   Medication Sig Dispense Refill   • NON SPECIFIED Albeterol inhaler 2 puffs every 6 hours. As needed for shortness of breath. Ok to administer while at more to life. 1 Each 1   • metoprolol SR (TOPROL XL) 100 MG TABLET SR 24 HR Take 1 Tab by mouth every day. 90 Tab 3   • citalopram (CELEXA) 20 MG Tab Take 1 Tab by mouth every day. 90 Tab 3   • albuterol (PROVENTIL) 2.5mg/3ml Nebu Soln solution for nebulization 3 mL by Nebulization route every four hours as needed. FOR SHORTNESS OF BREATH 300 mL 0   • albuterol 108 (90 Base) MCG/ACT Aero Soln inhalation aerosol Inhale 2 Puffs by mouth every four hours as needed for Shortness of Breath. 8.5 g 11   • pravastatin (PRAVACHOL) 40 MG tablet TAKE ONE TABLET BY MOUTH ONCE DAILY 100 Tab 3   •  "Fluticasone-Umeclidin-Vilant (TRELEGY ELLIPTA) 100-62.5-25 MCG/INH AEROSOL POWDER, BREATH ACTIVATED Inhale 1 Puff by mouth every day. 1 Each 11   • omeprazole (PRILOSEC) 40 MG delayed-release capsule Take 1 Cap by mouth every day. 90 Cap 3   • amLODIPine (NORVASC) 10 MG Tab Take 1 tablet by mouth once daily 90 Tab 0   • memantine (NAMENDA) 10 MG Tab Take 1 Tab by mouth 2 times a day. 90 Tab 3   • levothyroxine (SYNTHROID) 125 MCG Tab Take 1 Tab by mouth Every morning on an empty stomach. 90 Tab 3   • folic acid (FOLVITE) 1 MG Tab Take 1 Tab by mouth every day. 90 Tab 3   • acetaminophen (TYLENOL) 500 MG Tab Take 1,000 mg by mouth every 6 hours as needed for Moderate Pain. 30 Tab 0     No current facility-administered medications for this visit.          Allergies as of 08/07/2020   • (No Known Allergies)        ROS: Denies Chest pain, SOB, LE edema.    /72 (BP Location: Left arm, Patient Position: Sitting)   Pulse 68   Temp 36.6 °C (97.8 °F)   Ht 1.651 m (5' 5\")   Wt 76.7 kg (169 lb)   SpO2 96%   BMI 28.12 kg/m²     Physical Exam:  Gen:         Alert and oriented, No apparent distress.  Neck:        No Lymphadenopathy or Bruits.  Lungs:     Clear to auscultation bilaterally  CV:          Regular rate and rhythm. No murmurs, rubs or gallops.               Ext:          No clubbing, cyanosis, edema.      Assessment and Plan.   80 y.o. female with the following issues.    1. Dyspepsia  Improved continue PPI.    2. Chronic obstructive pulmonary disease, unspecified COPD type (HCC)  Refilled medications  - NON SPECIFIED; Albeterol inhaler 2 puffs every 6 hours. As needed for shortness of breath. Ok to administer while at more to life.  Dispense: 1 Each; Refill: 1      "

## 2020-09-24 RX ORDER — CITALOPRAM 20 MG/1
20 TABLET ORAL DAILY
Qty: 90 TAB | Refills: 3 | Status: SHIPPED | OUTPATIENT
Start: 2020-09-24 | End: 2021-02-11 | Stop reason: SDUPTHER

## 2020-10-01 RX ORDER — ALBUTEROL SULFATE 2.5 MG/3ML
2.5 SOLUTION RESPIRATORY (INHALATION) EVERY 4 HOURS PRN
Qty: 300 ML | Refills: 0 | Status: SHIPPED | OUTPATIENT
Start: 2020-10-01 | End: 2020-12-09 | Stop reason: SDUPTHER

## 2020-10-08 RX ORDER — AMLODIPINE BESYLATE 10 MG/1
TABLET ORAL
Qty: 90 TAB | Refills: 1 | Status: SHIPPED | OUTPATIENT
Start: 2020-10-08 | End: 2021-02-11 | Stop reason: SDUPTHER

## 2020-11-02 ENCOUNTER — TELEMEDICINE (OUTPATIENT)
Dept: MEDICAL GROUP | Facility: MEDICAL CENTER | Age: 80
End: 2020-11-02
Payer: MEDICARE

## 2020-11-02 ENCOUNTER — HOSPITAL ENCOUNTER (OUTPATIENT)
Dept: LAB | Facility: MEDICAL CENTER | Age: 80
End: 2020-11-02
Attending: FAMILY MEDICINE
Payer: MEDICARE

## 2020-11-02 VITALS
BODY MASS INDEX: 28.16 KG/M2 | WEIGHT: 169 LBS | TEMPERATURE: 98 F | OXYGEN SATURATION: 99 % | HEIGHT: 65 IN | HEART RATE: 90 BPM | RESPIRATION RATE: 16 BRPM

## 2020-11-02 DIAGNOSIS — R06.02 SHORTNESS OF BREATH: ICD-10-CM

## 2020-11-02 DIAGNOSIS — R05.9 COUGH: ICD-10-CM

## 2020-11-02 PROCEDURE — 99213 OFFICE O/P EST LOW 20 MIN: CPT | Mod: 95,CR | Performed by: FAMILY MEDICINE

## 2020-11-02 PROCEDURE — C9803 HOPD COVID-19 SPEC COLLECT: HCPCS

## 2020-11-02 PROCEDURE — U0003 INFECTIOUS AGENT DETECTION BY NUCLEIC ACID (DNA OR RNA); SEVERE ACUTE RESPIRATORY SYNDROME CORONAVIRUS 2 (SARS-COV-2) (CORONAVIRUS DISEASE [COVID-19]), AMPLIFIED PROBE TECHNIQUE, MAKING USE OF HIGH THROUGHPUT TECHNOLOGIES AS DESCRIBED BY CMS-2020-01-R: HCPCS

## 2020-11-02 RX ORDER — PROMETHAZINE HYDROCHLORIDE AND CODEINE PHOSPHATE 6.25; 1 MG/5ML; MG/5ML
5 SYRUP ORAL 4 TIMES DAILY PRN
Qty: 180 ML | Refills: 0 | Status: SHIPPED | OUTPATIENT
Start: 2020-11-02 | End: 2020-11-02 | Stop reason: SDUPTHER

## 2020-11-02 RX ORDER — PROMETHAZINE HYDROCHLORIDE AND CODEINE PHOSPHATE 6.25; 1 MG/5ML; MG/5ML
5 SYRUP ORAL 4 TIMES DAILY PRN
Qty: 180 ML | Refills: 0 | Status: SHIPPED | OUTPATIENT
Start: 2020-11-02 | End: 2020-11-11

## 2020-11-02 ASSESSMENT — FIBROSIS 4 INDEX: FIB4 SCORE: 1.62

## 2020-11-02 NOTE — PROGRESS NOTES
Virtual Visit: Established Patient   This visit was conducted via Zoom  using secure and encrypted videoconferencing technology. The patient was in a private location in the state of [place].    The patient's identity was confirmed and verbal consent was obtained for this virtual visit.      CC:  Cough, shortness of breath                                                                                                                                  HPI:   [place] presents today with the following.    1. Cough  Patient has had a productive cough since last Tuesday.  She has a history of asthma/COPD and at first thought it was just that but the cough has been more persistent and now she is somewhat short of breath when she walks.  Patient did have Covid a few months ago but was asymptomatic.  At that time her  developed Covid and unfortunately passed away from that.  She is now is having trouble sleeping due to cough.  States appetite is fine.  She and her son would like her to get tested for COVID-19 again.  I feel that is very appropriate and have placed the order for drive-through testing.    2. Shortness of breath  Patient states the shortness of breath is not bad.  I notice her respiratory rate is a little bit higher than what I would expect but no retractions.  Her son observes shortness of breath as does she when she is walking through the house.  She has been masking and staying away from the rest of the family.  The rest of the family is also wearing masks.  Her son denies any current symptoms.      Patient Active Problem List    Diagnosis Date Noted   • Risk for falls 08/13/2019   • Atherosclerosis of aorta (HCC) 02/12/2019   • Mixed incontinence 08/14/2018   • Osteoporosis without current pathological fracture 06/30/2017   • Vitamin B12 deficiency 04/21/2017   • IGT (impaired glucose tolerance) 04/03/2017   • Alzheimer's dementia without behavioral disturbance (HCC) 02/08/2017   • Essential hypertension  01/25/2017   • Chronic left shoulder pain 01/25/2017   • Acquired hypothyroidism 01/25/2017   • Dyslipidemia 01/25/2017   • Episode of recurrent major depressive disorder (HCC) 01/25/2017   • Chronic obstructive pulmonary disease (HCC) 01/25/2017       Current Outpatient Medications   Medication Sig Dispense Refill   • promethazine-codeine (PHENERGAN-CODEINE) 6.25-10 MG/5ML Syrup Take 5 mL by mouth 4 times a day as needed for up to 7 days. 180 mL 0   • amLODIPine (NORVASC) 10 MG Tab Take 1 tablet by mouth once daily 90 Tab 1   • albuterol (PROVENTIL) 2.5mg/3ml Nebu Soln solution for nebulization 3 mL by Nebulization route every four hours as needed. FOR SHORTNESS OF BREATH 300 mL 0   • citalopram (CELEXA) 20 MG Tab Take 1 Tab by mouth every day. 90 Tab 3   • pravastatin (PRAVACHOL) 40 MG tablet TAKE ONE TABLET BY MOUTH ONCE DAILY 100 Tab 3   • NON SPECIFIED Albeterol inhaler 2 puffs every 6 hours. As needed for shortness of breath. Ok to administer while at more to life. 1 Each 1   • metoprolol SR (TOPROL XL) 100 MG TABLET SR 24 HR Take 1 Tab by mouth every day. 90 Tab 3   • albuterol 108 (90 Base) MCG/ACT Aero Soln inhalation aerosol Inhale 2 Puffs by mouth every four hours as needed for Shortness of Breath. 8.5 g 11   • Fluticasone-Umeclidin-Vilant (TRELEGY ELLIPTA) 100-62.5-25 MCG/INH AEROSOL POWDER, BREATH ACTIVATED Inhale 1 Puff by mouth every day. 1 Each 11   • omeprazole (PRILOSEC) 40 MG delayed-release capsule Take 1 Cap by mouth every day. 90 Cap 3   • memantine (NAMENDA) 10 MG Tab Take 1 Tab by mouth 2 times a day. 90 Tab 3   • levothyroxine (SYNTHROID) 125 MCG Tab Take 1 Tab by mouth Every morning on an empty stomach. 90 Tab 3   • folic acid (FOLVITE) 1 MG Tab Take 1 Tab by mouth every day. 90 Tab 3   • acetaminophen (TYLENOL) 500 MG Tab Take 1,000 mg by mouth every 6 hours as needed for Moderate Pain. 30 Tab 0     No current facility-administered medications for this visit.          Allergies as of  "2020   • (No Known Allergies)        ROS:  Denies, chest pain, Shortness of breath, Edema.     Pulse 90 Comment: measured with pulse ox  Temp 36.7 °C (98 °F)   Resp 16 Comment: observed, no retractions  Ht 1.651 m (5' 5\")   Wt 76.7 kg (169 lb)   SpO2 99%   BMI 28.12 kg/m²  temperature is       Physical Exam:  Constitutional: Alert, no distress, well-groomed.  Wet cough.  Skin: No rashes in visible areas.  Eye: Round. Conjunctiva clear, No icterus.   ENMT: Lips pink without lesions, good dentition, moist mucous membranes. Phonation normal.  Neck: No masses, no thyromegaly. Moves freely without pain.  CV: Pulse as reported by patient  Respiratory: Unlabored respiratory effort, no cough or audible wheeze  Psych: Alert and oriented x3, normal affect and mood.      Patient and son wearing masks.  Her   from COVID.  Daughter in law was hospitalized.      Assessment and Plan.   80 y.o. female with the following issues.    1. Cough  Testing discussed and order placed.  Patient states she has taken Phenergan with codeine in the past with good success.  This is sent to her pharmacy.  - COVID/SARS COV-2 PCR; Future  - promethazine-codeine (PHENERGAN-CODEINE) 6.25-10 MG/5ML Syrup; Take 5 mL by mouth 4 times a day as needed for up to 7 days.  Dispense: 180 mL; Refill: 0    2. Shortness of breath  Order discussed and placed.  - COVID/SARS COV-2 PCR; Future      Recheck 6 weeks and as needed  "

## 2020-11-03 LAB
COVID ORDER STATUS COVID19: NORMAL
SARS-COV-2 RNA RESP QL NAA+PROBE: NOTDETECTED
SPECIMEN SOURCE: NORMAL

## 2020-11-03 NOTE — PROGRESS NOTES
David notified as that 180 mL's is actually 9 days not 7 so I have changed the prescription to be consistent.

## 2020-11-09 DIAGNOSIS — J44.1 COPD EXACERBATION (HCC): ICD-10-CM

## 2020-11-09 RX ORDER — PREDNISONE 10 MG/1
40 TABLET ORAL DAILY
Qty: 20 TAB | Refills: 0 | Status: SHIPPED | OUTPATIENT
Start: 2020-11-09 | End: 2021-05-17 | Stop reason: SDUPTHER

## 2020-11-16 ENCOUNTER — TELEPHONE (OUTPATIENT)
Dept: MEDICAL GROUP | Facility: MEDICAL CENTER | Age: 80
End: 2020-11-16

## 2020-11-25 ENCOUNTER — OFFICE VISIT (OUTPATIENT)
Dept: URGENT CARE | Facility: PHYSICIAN GROUP | Age: 80
End: 2020-11-25
Payer: MEDICARE

## 2020-11-25 VITALS
RESPIRATION RATE: 16 BRPM | SYSTOLIC BLOOD PRESSURE: 134 MMHG | WEIGHT: 170 LBS | TEMPERATURE: 98.2 F | OXYGEN SATURATION: 93 % | BODY MASS INDEX: 28.32 KG/M2 | HEIGHT: 65 IN | HEART RATE: 69 BPM | DIASTOLIC BLOOD PRESSURE: 64 MMHG

## 2020-11-25 DIAGNOSIS — H61.21 IMPACTED CERUMEN OF RIGHT EAR: ICD-10-CM

## 2020-11-25 PROCEDURE — 99213 OFFICE O/P EST LOW 20 MIN: CPT | Performed by: STUDENT IN AN ORGANIZED HEALTH CARE EDUCATION/TRAINING PROGRAM

## 2020-11-25 ASSESSMENT — FIBROSIS 4 INDEX: FIB4 SCORE: 1.62

## 2020-11-25 NOTE — PROGRESS NOTES
Subjective:   CHIEF COMPLAINT  Chief Complaint   Patient presents with   • Ear Fullness     fulness in both ears x 1 month       HPI  [place] Ernst is a 80 y.o. female who presents with a past medical history of dementia and Alzheimer's presents with a chief complaint of right ear cerumen impaction.  The patient was brought to the clinic by her daughter-in-law.  Patient lives with her son and daughter-in-law.  The patient had hearing aids placed about 2 months ago and at that time was told she had cerumen impaction and was instructed to come to the clinic for removal.  The patient has not been recently sick.  She denies any cough or congestion.    REVIEW OF SYSTEMS  General: no fever or chills  See HPI for further details.    PAST MEDICAL HISTORY   has a past medical history of Arthritis, Asthma, Breath shortness, COPD (chronic obstructive pulmonary disease) (HCC), Dental disorder, Disorder of thyroid, Emphysema of lung (HCC), High cholesterol, Hypertension, and Pain.    SURGICAL HISTORY   has a past surgical history that includes gyn surgery (1964); hysterectomy, total abdominal (1964); and temporal artery biopsy (Left, 12/19/2018).    ALLERGIES  No Known Allergies    CURRENT MEDICATIONS  Home Medications     Reviewed by Gurinder Crowder Ass't (Medical Assistant) on 11/25/20 at 1314  Med List Status: <None>   Medication Last Dose Status   acetaminophen (TYLENOL) 500 MG Tab PRN Active   albuterol (PROVENTIL) 2.5mg/3ml Nebu Soln solution for nebulization PRN Active   albuterol 108 (90 Base) MCG/ACT Aero Soln inhalation aerosol PRN Active   amLODIPine (NORVASC) 10 MG Tab Taking Active   citalopram (CELEXA) 20 MG Tab Taking Active   Fluticasone-Umeclidin-Vilant (TRELEGY ELLIPTA) 100-62.5-25 MCG/INH AEROSOL POWDER, BREATH ACTIVATED Taking Active   folic acid (FOLVITE) 1 MG Tab Taking Active   levothyroxine (SYNTHROID) 125 MCG Tab Taking Active   memantine (NAMENDA) 10 MG Tab Taking Active   metoprolol SR  "(TOPROL XL) 100 MG TABLET SR 24 HR Taking Active   NON SPECIFIED Taking Active   omeprazole (PRILOSEC) 40 MG delayed-release capsule Taking Active   pravastatin (PRAVACHOL) 40 MG tablet Taking Active                SOCIAL HISTORY  Social History     Tobacco Use   • Smoking status: Never Smoker   • Smokeless tobacco: Never Used   Substance and Sexual Activity   • Alcohol use: No   • Drug use: No   • Sexual activity: Yes     Partners: Male       FAMILY HISTORY  Family History   Problem Relation Age of Onset   • Stroke Mother    • No Known Problems Father    • Heart Disease Brother           Objective:   PHYSICAL EXAM  VITAL SIGNS: /64 (BP Location: Left arm, Patient Position: Sitting, BP Cuff Size: Adult)   Pulse 69   Temp 36.8 °C (98.2 °F) (Temporal)   Resp 16   Ht 1.651 m (5' 5\")   Wt 77.1 kg (170 lb)   SpO2 93%   BMI 28.29 kg/m²     Gen: no acute distress, normal voice  Skin: dry, intact, moist mucosal membranes  ENT: Right cerumen impaction  Psych: normal affect, normal judgement, alert, awake    Assessment/Plan:     1. Impacted cerumen of right ear     Ear lavage performed with successful removal of cerumen.  Patient tolerated the procedure well without any complications.  Patient has a scheduled appointment with her audiologist next week and instructed to follow-up as planned.    Differential diagnosis, natural history, supportive care, and indications for immediate follow-up discussed. All questions answered. Patient agrees with the plan of care.    Follow-up as needed if symptoms worsen or fail to improve to PCP, Urgent care or Emergency Room.    Please note that this dictation was created using voice recognition software. I have made a reasonable attempt to correct obvious errors, but I expect that there are errors of grammar and possibly content that I did not discover before finalizing the note.         "

## 2020-12-09 RX ORDER — ALBUTEROL SULFATE 2.5 MG/3ML
2.5 SOLUTION RESPIRATORY (INHALATION) EVERY 4 HOURS PRN
Qty: 300 ML | Refills: 0 | Status: SHIPPED | OUTPATIENT
Start: 2020-12-09 | End: 2021-02-11

## 2020-12-28 ENCOUNTER — DOCUMENTATION (OUTPATIENT)
Dept: MEDICAL GROUP | Facility: MEDICAL CENTER | Age: 80
End: 2020-12-28

## 2021-01-11 DIAGNOSIS — Z23 NEED FOR VACCINATION: ICD-10-CM

## 2021-01-14 ENCOUNTER — IMMUNIZATION (OUTPATIENT)
Dept: FAMILY PLANNING/WOMEN'S HEALTH CLINIC | Facility: IMMUNIZATION CENTER | Age: 81
End: 2021-01-14
Attending: INTERNAL MEDICINE
Payer: MEDICARE

## 2021-01-14 DIAGNOSIS — Z23 NEED FOR VACCINATION: ICD-10-CM

## 2021-01-14 DIAGNOSIS — Z23 ENCOUNTER FOR VACCINATION: Primary | ICD-10-CM

## 2021-01-14 PROCEDURE — 91300 PFIZER SARS-COV-2 VACCINE: CPT

## 2021-01-14 PROCEDURE — 0001A PFIZER SARS-COV-2 VACCINE: CPT

## 2021-02-04 ENCOUNTER — IMMUNIZATION (OUTPATIENT)
Dept: FAMILY PLANNING/WOMEN'S HEALTH CLINIC | Facility: IMMUNIZATION CENTER | Age: 81
End: 2021-02-04
Attending: INTERNAL MEDICINE
Payer: MEDICARE

## 2021-02-04 DIAGNOSIS — Z23 ENCOUNTER FOR VACCINATION: Primary | ICD-10-CM

## 2021-02-04 PROCEDURE — 0002A PFIZER SARS-COV-2 VACCINE: CPT | Performed by: PHYSICIAN ASSISTANT

## 2021-02-04 PROCEDURE — 91300 PFIZER SARS-COV-2 VACCINE: CPT | Performed by: PHYSICIAN ASSISTANT

## 2021-02-09 ENCOUNTER — PATIENT MESSAGE (OUTPATIENT)
Dept: MEDICAL GROUP | Facility: MEDICAL CENTER | Age: 81
End: 2021-02-09

## 2021-02-09 ENCOUNTER — NURSE TRIAGE (OUTPATIENT)
Dept: MEDICAL GROUP | Facility: MEDICAL CENTER | Age: 81
End: 2021-02-09

## 2021-02-09 NOTE — TELEPHONE ENCOUNTER
"    Reason for Disposition  • Unexplained nausea    Answer Assessment - Initial Assessment Questions  1. NAUSEA SEVERITY: \"How bad is the nausea?\" (e.g., mild, moderate, severe; dehydration, weight loss)    - MILD: loss of appetite without change in eating habits    - MODERATE: decreased oral intake without significant weight loss, dehydration, or malnutrition    - SEVERE: inadequate caloric or fluid intake, significant weight loss, symptoms of dehydration      Moderate.   2. ONSET: \"When did the nausea begin?\"      Morning 02/09/2021  3. VOMITING: \"Any vomiting?\" If so, ask: \"How many times today?\"      NONE  4. RECURRENT SYMPTOM: \"Have you had nausea before?\" If so, ask: \"When was the last time?\" \"What happened that time?\"      No  5. CAUSE: \"What do you think is causing the nausea?\"      No  6. PREGNANCY: \"Is there any chance you are pregnant?\" (e.g., unprotected intercourse, missed birth control pill, broken condom)      N/A    Protocols used: NAUSEA-A-[place]    Advised patient son to keep Thursday appointment and when to go to the ED/Urgent Care  "

## 2021-02-11 ENCOUNTER — OFFICE VISIT (OUTPATIENT)
Dept: MEDICAL GROUP | Facility: MEDICAL CENTER | Age: 81
End: 2021-02-11
Payer: MEDICARE

## 2021-02-11 VITALS
HEART RATE: 73 BPM | RESPIRATION RATE: 16 BRPM | OXYGEN SATURATION: 94 % | HEIGHT: 65 IN | SYSTOLIC BLOOD PRESSURE: 118 MMHG | DIASTOLIC BLOOD PRESSURE: 60 MMHG | TEMPERATURE: 99.1 F | BODY MASS INDEX: 27.99 KG/M2 | WEIGHT: 168 LBS

## 2021-02-11 DIAGNOSIS — F33.9 EPISODE OF RECURRENT MAJOR DEPRESSIVE DISORDER, UNSPECIFIED DEPRESSION EPISODE SEVERITY (HCC): ICD-10-CM

## 2021-02-11 DIAGNOSIS — M81.0 OSTEOPOROSIS WITHOUT CURRENT PATHOLOGICAL FRACTURE, UNSPECIFIED OSTEOPOROSIS TYPE: ICD-10-CM

## 2021-02-11 DIAGNOSIS — J44.9 CHRONIC OBSTRUCTIVE PULMONARY DISEASE, UNSPECIFIED COPD TYPE (HCC): ICD-10-CM

## 2021-02-11 DIAGNOSIS — E78.5 DYSLIPIDEMIA: ICD-10-CM

## 2021-02-11 DIAGNOSIS — I70.0 ATHEROSCLEROSIS OF AORTA (HCC): ICD-10-CM

## 2021-02-11 DIAGNOSIS — F02.80 LATE ONSET ALZHEIMER'S DISEASE WITHOUT BEHAVIORAL DISTURBANCE (HCC): ICD-10-CM

## 2021-02-11 DIAGNOSIS — G30.1 LATE ONSET ALZHEIMER'S DISEASE WITHOUT BEHAVIORAL DISTURBANCE (HCC): ICD-10-CM

## 2021-02-11 DIAGNOSIS — R73.02 IGT (IMPAIRED GLUCOSE TOLERANCE): ICD-10-CM

## 2021-02-11 DIAGNOSIS — Z87.39 HISTORY OF TEMPORAL ARTERITIS: ICD-10-CM

## 2021-02-11 DIAGNOSIS — R26.89 BALANCE PROBLEM: ICD-10-CM

## 2021-02-11 DIAGNOSIS — E53.8 VITAMIN B12 DEFICIENCY: ICD-10-CM

## 2021-02-11 DIAGNOSIS — I10 ESSENTIAL HYPERTENSION: ICD-10-CM

## 2021-02-11 DIAGNOSIS — E03.9 ACQUIRED HYPOTHYROIDISM: ICD-10-CM

## 2021-02-11 DIAGNOSIS — Z91.81 RISK FOR FALLS: ICD-10-CM

## 2021-02-11 DIAGNOSIS — R10.13 DYSPEPSIA: ICD-10-CM

## 2021-02-11 PROCEDURE — 99214 OFFICE O/P EST MOD 30 MIN: CPT | Performed by: FAMILY MEDICINE

## 2021-02-11 RX ORDER — MEMANTINE HYDROCHLORIDE 10 MG/1
10 TABLET ORAL 2 TIMES DAILY
Qty: 90 TABLET | Refills: 3 | Status: SHIPPED | OUTPATIENT
Start: 2021-02-11 | End: 2021-08-16

## 2021-02-11 RX ORDER — OMEPRAZOLE 40 MG/1
40 CAPSULE, DELAYED RELEASE ORAL DAILY
Qty: 90 CAPSULE | Refills: 3 | Status: SHIPPED | OUTPATIENT
Start: 2021-02-11 | End: 2022-04-21

## 2021-02-11 RX ORDER — LEVOTHYROXINE SODIUM 0.12 MG/1
125 TABLET ORAL
Qty: 90 TABLET | Refills: 3 | Status: SHIPPED | OUTPATIENT
Start: 2021-02-11 | End: 2022-03-04 | Stop reason: SDUPTHER

## 2021-02-11 RX ORDER — FOLIC ACID 1 MG/1
1 TABLET ORAL DAILY
Qty: 90 TABLET | Refills: 3 | Status: SHIPPED | OUTPATIENT
Start: 2021-02-11 | End: 2022-05-09

## 2021-02-11 RX ORDER — AMLODIPINE BESYLATE 10 MG/1
TABLET ORAL
Qty: 90 TABLET | Refills: 1 | Status: SHIPPED | OUTPATIENT
Start: 2021-02-11 | End: 2021-09-30

## 2021-02-11 RX ORDER — METOPROLOL SUCCINATE 100 MG/1
100 TABLET, EXTENDED RELEASE ORAL DAILY
Qty: 90 TABLET | Refills: 3 | Status: SHIPPED | OUTPATIENT
Start: 2021-02-11 | End: 2022-03-04 | Stop reason: SDUPTHER

## 2021-02-11 RX ORDER — ALBUTEROL SULFATE 2.5 MG/3ML
SOLUTION RESPIRATORY (INHALATION)
Qty: 300 ML | Refills: 0 | Status: SHIPPED | OUTPATIENT
Start: 2021-02-11 | End: 2021-08-01

## 2021-02-11 RX ORDER — PRAVASTATIN SODIUM 40 MG
TABLET ORAL
Qty: 100 TABLET | Refills: 3 | Status: SHIPPED | OUTPATIENT
Start: 2021-02-11 | End: 2022-05-11

## 2021-02-11 RX ORDER — CITALOPRAM 20 MG/1
20 TABLET ORAL DAILY
Qty: 90 TABLET | Refills: 3 | Status: SHIPPED | OUTPATIENT
Start: 2021-02-11 | End: 2022-04-20

## 2021-02-11 ASSESSMENT — PATIENT HEALTH QUESTIONNAIRE - PHQ9
7. TROUBLE CONCENTRATING ON THINGS, SUCH AS READING THE NEWSPAPER OR WATCHING TELEVISION: NOT AT ALL
2. FEELING DOWN, DEPRESSED, IRRITABLE, OR HOPELESS: NOT AT ALL
9. THOUGHTS THAT YOU WOULD BE BETTER OFF DEAD, OR OF HURTING YOURSELF: NOT AT ALL
1. LITTLE INTEREST OR PLEASURE IN DOING THINGS: NOT AT ALL
3. TROUBLE FALLING OR STAYING ASLEEP OR SLEEPING TOO MUCH: NOT AT ALL
5. POOR APPETITE OR OVEREATING: NOT AT ALL
SUM OF ALL RESPONSES TO PHQ QUESTIONS 1-9: 0
4. FEELING TIRED OR HAVING LITTLE ENERGY: NOT AT ALL
8. MOVING OR SPEAKING SO SLOWLY THAT OTHER PEOPLE COULD HAVE NOTICED. OR THE OPPOSITE, BEING SO FIGETY OR RESTLESS THAT YOU HAVE BEEN MOVING AROUND A LOT MORE THAN USUAL: NOT AT ALL
6. FEELING BAD ABOUT YOURSELF - OR THAT YOU ARE A FAILURE OR HAVE LET YOURSELF OR YOUR FAMILY DOWN: NOT AL ALL
SUM OF ALL RESPONSES TO PHQ9 QUESTIONS 1 AND 2: 0

## 2021-02-11 ASSESSMENT — FIBROSIS 4 INDEX: FIB4 SCORE: 1.62

## 2021-02-11 NOTE — PROGRESS NOTES
1.  Are you currently engaging in any exercise or physical activity? Yes    2.  How would you describe your mood or emotional well-being today? tired    3.  Have you had any falls in the last year? Yes    4.  Have you noticed any problems with your balance or had difficulty walking? Yes    5.  In the last six months have you experienced any leakage of urine? Yes    6. DPA/Advanced Directive: Patient does not have an Advanced Directive.  A packet and workshop information was given on Advanced Directives.

## 2021-02-11 NOTE — PROGRESS NOTES
Chief Complaint   Patient presents with   • Annual Wellness Visit         HPI:  [place] Ernst is a 80 y.o. here for Medicare Annual Wellness Visit     Patient Active Problem List    Diagnosis Date Noted   • Balance problem 02/11/2021   • History of temporal arteritis 02/11/2021   • Dyspepsia 02/11/2021   • Risk for falls 08/13/2019   • Atherosclerosis of aorta (HCC) 02/12/2019   • Mixed incontinence 08/14/2018   • Osteoporosis without current pathological fracture 06/30/2017   • Vitamin B12 deficiency 04/21/2017   • IGT (impaired glucose tolerance) 04/03/2017   • Alzheimer's dementia without behavioral disturbance (HCC) 02/08/2017   • Essential hypertension 01/25/2017   • Chronic left shoulder pain 01/25/2017   • Acquired hypothyroidism 01/25/2017   • Dyslipidemia 01/25/2017   • Episode of recurrent major depressive disorder (HCC) 01/25/2017   • Chronic obstructive pulmonary disease (HCC) 01/25/2017       Current Outpatient Medications   Medication Sig Dispense Refill   • folic acid (FOLVITE) 1 MG Tab Take 1 tablet by mouth every day. 90 tablet 3   • levothyroxine (SYNTHROID) 125 MCG Tab Take 1 tablet by mouth Every morning on an empty stomach. 90 tablet 3   • memantine (NAMENDA) 10 MG Tab Take 1 tablet by mouth 2 times a day. 90 tablet 3   • metoprolol SR (TOPROL XL) 100 MG TABLET SR 24 HR Take 1 tablet by mouth every day. 90 tablet 3   • pravastatin (PRAVACHOL) 40 MG tablet TAKE ONE TABLET BY MOUTH ONCE DAILY 100 tablet 3   • citalopram (CELEXA) 20 MG Tab Take 1 tablet by mouth every day. 90 tablet 3   • amLODIPine (NORVASC) 10 MG Tab Take 1 tablet by mouth once daily 90 tablet 1   • Fluticasone-Umeclidin-Vilant (TRELEGY ELLIPTA) 100-62.5-25 MCG/INH AEROSOL POWDER, BREATH ACTIVATED Inhale 1 Puff every day. 1 Each 11   • omeprazole (PRILOSEC) 40 MG delayed-release capsule Take 1 capsule by mouth every day. 90 capsule 3   • Cholecalciferol (VITAMIN D3) 125 MCG (5000 UT) Cap Take 1 capsule by mouth every day. 30  capsule 11   • albuterol (PROVENTIL) 2.5mg/3ml Nebu Soln solution for nebulization USE 3 ML VIA NEBULIZER EVERY 4 HOURS AS NEEDED FOR SHORTNESS OF BREATH 300 mL 0   • NON SPECIFIED Albeterol inhaler 2 puffs every 6 hours. As needed for shortness of breath. Ok to administer while at more to life. 1 Each 1   • albuterol 108 (90 Base) MCG/ACT Aero Soln inhalation aerosol Inhale 2 Puffs by mouth every four hours as needed for Shortness of Breath. 8.5 g 11   • acetaminophen (TYLENOL) 500 MG Tab Take 1,000 mg by mouth every 6 hours as needed for Moderate Pain. 30 Tab 0     No current facility-administered medications for this visit.            Current supplements as per medication list.       Allergies: Patient has no known allergies.    Current social contact/activities: She lives with her son and his wife.  She has significant family support.    She  reports that she has never smoked. She has never used smokeless tobacco. She reports that she does not drink alcohol and does not use drugs.  Counseling given: Yes      DPA/Advanced Directive:  Patient does not have an Advanced Directive.  A packet and workshop information was given on Advanced Directives.  Actually her son think she does have an advanced directive and will get us copy.    ROS:    Gait: Uses a cane  Ostomy: No  Other tubes: No  Amputations: No  Chronic oxygen use: No  Last eye exam: Has been quite recent just this last fall  Wears hearing aids: Yes   : Reports urinary leakage during the last 6 months that has somewhat interfered with their daily activities or sleep.  Patient wears a pad.    Screening:  Patient has received her Covid vaccines, Pfizer.  She is contemplating completing her bone density in a few months.    Depression Screening    Little interest or pleasure in doing things?   Not at all  Feeling down, depressed , or hopeless?  Not at all  Trouble falling or staying asleep, or sleeping too much?   Not at all  Feeling tired or having little  energy?   Not at all  Poor appetite or overeating?   Not at all  Feeling bad about yourself - or that you are a failure or have let yourself or your family down?  Not al all  Trouble concentrating on things, such as reading the newspaper or watching television?  Not at all  Moving or speaking so slowly that other people could have noticed.  Or the opposite - being so fidgety or restless that you have been moving around a lot more than usual?   Not at all  Thoughts that you would be better off dead, or of hurting yourself?   Not at all  Patient Health Questionnaire Score:  0      If depressive symptoms identified deferred to follow up visit unless specifically addressed in assesment and plan.    Interpretation of PHQ-9 Total Score   Score Severity   1-4 No Depression   5-9 Mild Depression   10-14 Moderate Depression   15-19 Moderately Severe Depression   20-27 Severe Depression    If depressive symptoms identified deferred to follow up visit unless specifically addressed in assessment and plan.    Interpretation of PHQ-9 Total Score   Score Severity   1-4 No Depression   5-9 Mild Depression   10-14 Moderate Depression   15-19 Moderately Severe Depression   20-27 Severe Depression      Screening for Cognitive Impairment    Three Minute Recall (river, nation, finger)  /3    Alphonso clock face with all 12 numbers and set the hands to show 10 past 11.       Cognitive concerns identified deferred for follow up unless specifically addressed in assessment and plan.    Fall Risk Assessment    Has the patient had two or more falls in the last year or any fall with injury in the last year?  Yes    Safety Assessment    Throw rugs on floor.     Handrails on all stairs.     Good lighting in all hallways.     Difficulty hearing.     Patient counseled about all safety risks that were identified.    Functional Assessment ADLs    Are there any barriers preventing you from cooking for yourself or meeting nutritional needs?   .    Are  there any barriers preventing you from driving safely or obtaining transportation?   .    Are there any barriers preventing you from using a telephone or calling for help?   .    Are there any barriers preventing you from shopping?   .    Are there any barriers preventing you from taking care of your own finances?   .    Are there any barriers preventing you from managing your medications?   .    Are there any barriers preventing you from showering, bathing or dressing yourself?  .    Are you currently engaging in any exercise or physical activity?   .     What is your perception of your health?   .    Health Maintenance Summary                BONE DENSITY Overdue 6/22/2019      Done 6/22/2017 DS-BONE DENSITY STUDY (DEXA)    IMM DTaP/Tdap/Td Vaccine Postponed 2/9/2028 Originally 5/21/1959. Insurance/Financial    Annual Pulmonary Function Test / Spirometry Postponed 8/16/2029 Originally 5/21/1946. Insurance/Financial    IMM ZOSTER VACCINES Postponed 2/16/2033 Originally 5/21/1988. Insurance/Financial    Annual Wellness Visit Next Due 2/12/2022      Done 2/11/2021      Patient has more history with this topic...          Patient Care Team:  Paulino Elias M.D. as PCP - General (Family Medicine)  Liat Roger as    Kam Claudio M.D. as Consulting Physician (Neurology)      Social History     Tobacco Use   • Smoking status: Never Smoker   • Smokeless tobacco: Never Used   Substance Use Topics   • Alcohol use: No   • Drug use: No     Family History   Problem Relation Age of Onset   • Stroke Mother    • No Known Problems Father    • Heart Disease Brother      She  has a past medical history of Arthritis, Asthma, Balance problem (2/11/2021), Breath shortness, COPD (chronic obstructive pulmonary disease) (HCC), Dental disorder, Disorder of thyroid, Emphysema of lung (HCC), High cholesterol, Hypertension, and Pain.   Past Surgical History:   Procedure Laterality Date   • TEMPORAL ARTERY  "BIOPSY Left 2018    Procedure: TEMPORAL ARTERY BIOPSY;  Surgeon: Orestes Mina M.D.;  Location: SURGERY [facility];  Service: General   • GYN SURGERY         • HYSTERECTOMY, TOTAL ABDOMINAL         Exam:   /60   Pulse 73   Temp 37.3 °C (99.1 °F)   Resp 16   Ht 1.651 m (5' 5\")   Wt 76.2 kg (168 lb)   SpO2 94%  Body mass index is 27.96 kg/m².    Hearing fair.    Dentition not examined as patient and son both wearing masks.  Physician and staff also wearing masks.  Alert, oriented in no acute distress.  Eye contact is good, speech goal directed, affect calm  Neck has reasonable range of motion with mild limitation to extension.  There is mild cervical kyphosis.  No thyromegaly or neck mass appreciated.  No adenopathy appreciated.  No JVD or carotid bruits appreciated.  Lungs clear to auscultation A&P with good air movement  Heart regular rate and rhythm, normal S1-S2 without murmur appreciated  Abdomen soft without bloating and with no hepatosplenomegaly appreciated.  No peripheral edema appreciated.    Past lab results reviewed and discussed.  New laboratory orders for follow-up are placed.    Assessment and Plan. The following treatment and monitoring plan is recommended:      1. Late onset Alzheimer's disease without behavioral disturbance (HCC)/Episode of recurrent major depressive disorder, unspecified depression episode severity (HCC)  Patient does have Alzheimer's disease diagnosed in the past with neurology.  This manifested not only with short-term memory and trouble remembering names and faces but also angry outbursts.  The Keppra in the past actually made things worse.  In the past memantine had made her hallucinate but she is doing very well now on memantine.  She continues to do her own laundry.  She is recognizing most of the family members quite well though she cannot always name them.  She lives with her son and his wife who take very good care of her.  The " addition of Celexa seems to have helped reduce the agitation as well.  She has been very stable now on this regimen over a year.  She underwent a PET CT in April last year which showed symmetric hypometabolism suggestive of Alzheimer's dementia per the .  Her neurologist adjusted her medication regimen.  It appears that her depression is tied more to her dementia.  It is also stable.  Stable problems overall.  - memantine (NAMENDA) 10 MG Tab; Take 1 tablet by mouth 2 times a day.  Dispense: 90 tablet; Refill: 3    2. Chronic obstructive pulmonary disease, unspecified COPD type (HCC)  Patient does have a past diagnosis of COPD and has been doing very well with the daily Trelegy.  Her son says he occasionally has to remind her but her regimen has been quite stable and followed carefully.  Denies current shortness of breath.  Denies any exacerbation.  Denies nighttime cough.  Follow-up lab order discussed and placed.  Stable problem.  - Fluticasone-Umeclidin-Vilant (TRELEGY ELLIPTA) 100-62.5-25 MCG/INH AEROSOL POWDER, BREATH ACTIVATED; Inhale 1 Puff every day.  Dispense: 1 Each; Refill: 11  - CBC WITHOUT DIFFERENTIAL; Future    3. IGT (impaired glucose tolerance)  Patient does have history of impaired glucose tolerance.  Her last testing in July showed excellent control.  Her son feels she is doing well eating with them.  Follow-up lab orders are discussed and placed.  Stable problem.  - Comp Metabolic Panel; Future  - HEMOGLOBIN A1C; Future    4. Atherosclerosis of aorta ([facility])  Patient has history of atherosclerosis without aneurysm.  Abdominal ultrasound in July was essentially normal.  Stable problem.    5. Dyslipidemia  Patient denies chest pain, chest pressure, palpitations or exertional shortness of breath. Patient denies muscle aches or muscle weakness from the pravastatin medication. Patient is a never smoker. Patient takes no aspirin daily. Patient has no history of myocardial infarction,  stroke or PVD.  The problem is clinically stable.  - pravastatin (PRAVACHOL) 40 MG tablet; TAKE ONE TABLET BY MOUTH ONCE DAILY  Dispense: 100 tablet; Refill: 3  - Comp Metabolic Panel; Future  - Lipid Profile; Future    6. Essential hypertension  HTN - Chronic condition stable. Currently taking all meds as directed.   She is not taking baby aspirin daily.   She is monitoring BP at home.  They do not monitor often as this has generally been very good.  Denies symptoms low BP: light-headed, tunnel-vision, unusual fatigue.   Denies symptoms high BP:pounding headache, visual changes, palpitations, flushed face.   Denies medicine side effects: unusual fatigue, slow heartbeat, foot/leg swelling, cough.  - metoprolol SR (TOPROL XL) 100 MG TABLET SR 24 HR; Take 1 tablet by mouth every day.  Dispense: 90 tablet; Refill: 3  - amLODIPine (NORVASC) 10 MG Tab; Take 1 tablet by mouth once daily  Dispense: 90 tablet; Refill: 1  - Comp Metabolic Panel; Future    7. History of temporal arteritis  This was in the past and she has been on folate supplementation ever since.  Denies any temporal pain.  There is no tenderness to palpation today.  Stable problem.  - folic acid (FOLVITE) 1 MG Tab; Take 1 tablet by mouth every day.  Dispense: 90 tablet; Refill: 3    8. Acquired hypothyroidism  Patient reports good energy level on the medication. Patient denies insomnia, tremor or change in appetite.  Patient is taking the medication on an empty stomach in the morning and waiting at least 30 minutes before eating.  Last TSH in July was at target.  Follow-up lab order discussed and placed.  Stable problem.  - levothyroxine (SYNTHROID) 125 MCG Tab; Take 1 tablet by mouth Every morning on an empty stomach.  Dispense: 90 tablet; Refill: 3  - TSH; Future    9. Osteoporosis without current pathological fracture, unspecified osteoporosis type  Patient does have history of osteoporosis.  Her last bone density test in 2017 showed improvement to  osteopenia.  Stable problem.  - Cholecalciferol (VITAMIN D3) 125 MCG (5000 UT) Cap; Take 1 capsule by mouth every day.  Dispense: 30 capsule; Refill: 11    10. Vitamin B12 deficiency  Patient lacks ability to absorb well, she continues B12 supplementation.  Follow-up lab order discussed and placed.  Stable problem.  - VITAMIN B12; Future    11. Dyspepsia  The patient feels the current medication regimen of omeprazole is controlling the gastroesophageal reflux symptoms well. Denies dysphagia, reflux symptoms, acidity, abdominal pain or visible blood or mucus in the stool. Denies vomiting or hematemesis. Denies burping or abdominal bloating. Patient avoids nonsteroidal anti-inflammatory drugs. Avoids heavy meals or eating within 2 hours of bedtime.  Stable problem.  - omeprazole (PRILOSEC) 40 MG delayed-release capsule; Take 1 capsule by mouth every day.  Dispense: 90 capsule; Refill: 3  - Comp Metabolic Panel; Future  - CBC WITHOUT DIFFERENTIAL; Future    12. Risk for falls/Balance problem  Patient does have balance problems which are partly weakness.  These have been stable.  She continues to use a cane.  Is three-pronged.  Stable problem.  - Patient identified as fall risk.  Appropriate orders and counseling given.      Services suggested: No services needed at this time  Health Care Screening: Age-appropriate preventive services recommended by USPTF and ACIP covered by Medicare were discussed today. Services ordered if indicated and agreed upon by the patient.  Referrals offered: Community-based lifestyle interventions to reduce health risks and promote self-management and wellness, fall prevention, nutrition, physical activity, tobacco-use cessation, weight loss, and mental health services as per orders if indicated.    Discussion today about general wellness and lifestyle habits:  Discussed  · Prevent falls and reduce trip hazards; Cautioned about securing or removing rugs.  · Have a working fire alarm and  carbon monoxide detector; they have this  · Engage in regular physical activity and social activities     Follow-up: Return in about 6 months (around 8/11/2021), or if symptoms worsen or fail to improve.

## 2021-02-17 ENCOUNTER — HOSPITAL ENCOUNTER (EMERGENCY)
Facility: MEDICAL CENTER | Age: 81
End: 2021-02-17
Attending: EMERGENCY MEDICINE
Payer: MEDICARE

## 2021-02-17 ENCOUNTER — OFFICE VISIT (OUTPATIENT)
Dept: URGENT CARE | Facility: PHYSICIAN GROUP | Age: 81
End: 2021-02-17
Payer: MEDICARE

## 2021-02-17 VITALS
RESPIRATION RATE: 14 BRPM | HEART RATE: 66 BPM | WEIGHT: 160 LBS | TEMPERATURE: 97.8 F | SYSTOLIC BLOOD PRESSURE: 140 MMHG | OXYGEN SATURATION: 94 % | DIASTOLIC BLOOD PRESSURE: 62 MMHG | BODY MASS INDEX: 26.63 KG/M2

## 2021-02-17 VITALS
TEMPERATURE: 97.8 F | HEART RATE: 61 BPM | DIASTOLIC BLOOD PRESSURE: 71 MMHG | BODY MASS INDEX: 28.5 KG/M2 | WEIGHT: 171.08 LBS | OXYGEN SATURATION: 94 % | SYSTOLIC BLOOD PRESSURE: 138 MMHG | RESPIRATION RATE: 18 BRPM | HEIGHT: 65 IN

## 2021-02-17 DIAGNOSIS — R07.9 CHEST PAIN, UNSPECIFIED TYPE: ICD-10-CM

## 2021-02-17 DIAGNOSIS — R42 DIZZINESS: ICD-10-CM

## 2021-02-17 DIAGNOSIS — M25.561 ACUTE PAIN OF RIGHT KNEE: ICD-10-CM

## 2021-02-17 LAB
ALBUMIN SERPL BCP-MCNC: 4.1 G/DL (ref 3.2–4.9)
ALBUMIN/GLOB SERPL: 1.3 G/DL
ALP SERPL-CCNC: 117 U/L (ref 30–99)
ALT SERPL-CCNC: 15 U/L (ref 2–50)
ANION GAP SERPL CALC-SCNC: 10 MMOL/L (ref 7–16)
APPEARANCE UR: CLEAR
AST SERPL-CCNC: 21 U/L (ref 12–45)
BASOPHILS # BLD AUTO: 0.6 % (ref 0–1.8)
BASOPHILS # BLD: 0.06 K/UL (ref 0–0.12)
BILIRUB SERPL-MCNC: 0.4 MG/DL (ref 0.1–1.5)
BILIRUB UR STRIP-MCNC: NORMAL MG/DL
BUN SERPL-MCNC: 16 MG/DL (ref 8–22)
CALCIUM SERPL-MCNC: 9.8 MG/DL (ref 8.5–10.5)
CHLORIDE SERPL-SCNC: 103 MMOL/L (ref 96–112)
CO2 SERPL-SCNC: 27 MMOL/L (ref 20–33)
COLOR UR AUTO: YELLOW
CREAT SERPL-MCNC: 0.56 MG/DL (ref 0.5–1.4)
EKG IMPRESSION: NORMAL
EOSINOPHIL # BLD AUTO: 0.18 K/UL (ref 0–0.51)
EOSINOPHIL NFR BLD: 1.9 % (ref 0–6.9)
ERYTHROCYTE [DISTWIDTH] IN BLOOD BY AUTOMATED COUNT: 44.2 FL (ref 35.9–50)
GLOBULIN SER CALC-MCNC: 3.1 G/DL (ref 1.9–3.5)
GLUCOSE SERPL-MCNC: 103 MG/DL (ref 65–99)
GLUCOSE UR STRIP.AUTO-MCNC: NORMAL MG/DL
HCT VFR BLD AUTO: 44 % (ref 37–47)
HGB BLD-MCNC: 14.2 G/DL (ref 12–16)
IMM GRANULOCYTES # BLD AUTO: 0.04 K/UL (ref 0–0.11)
IMM GRANULOCYTES NFR BLD AUTO: 0.4 % (ref 0–0.9)
KETONES UR STRIP.AUTO-MCNC: NORMAL MG/DL
LEUKOCYTE ESTERASE UR QL STRIP.AUTO: NORMAL
LYMPHOCYTES # BLD AUTO: 2.03 K/UL (ref 1–4.8)
LYMPHOCYTES NFR BLD: 21.6 % (ref 22–41)
MCH RBC QN AUTO: 31.9 PG (ref 27–33)
MCHC RBC AUTO-ENTMCNC: 32.3 G/DL (ref 33.6–35)
MCV RBC AUTO: 98.9 FL (ref 81.4–97.8)
MONOCYTES # BLD AUTO: 0.74 K/UL (ref 0–0.85)
MONOCYTES NFR BLD AUTO: 7.9 % (ref 0–13.4)
NEUTROPHILS # BLD AUTO: 6.36 K/UL (ref 2–7.15)
NEUTROPHILS NFR BLD: 67.6 % (ref 44–72)
NITRITE UR QL STRIP.AUTO: NORMAL
NRBC # BLD AUTO: 0 K/UL
NRBC BLD-RTO: 0 /100 WBC
PH UR STRIP.AUTO: 7 [PH] (ref 5–8)
PLATELET # BLD AUTO: 271 K/UL (ref 164–446)
PMV BLD AUTO: 10 FL (ref 9–12.9)
POTASSIUM SERPL-SCNC: 3.9 MMOL/L (ref 3.6–5.5)
PROT SERPL-MCNC: 7.2 G/DL (ref 6–8.2)
PROT UR QL STRIP: NORMAL MG/DL
RBC # BLD AUTO: 4.45 M/UL (ref 4.2–5.4)
RBC UR QL AUTO: NORMAL
SODIUM SERPL-SCNC: 140 MMOL/L (ref 135–145)
SP GR UR STRIP.AUTO: 1.02
TROPONIN T SERPL-MCNC: 11 NG/L (ref 6–19)
UROBILINOGEN UR STRIP-MCNC: 1 MG/DL
WBC # BLD AUTO: 9.4 K/UL (ref 4.8–10.8)

## 2021-02-17 PROCEDURE — 99215 OFFICE O/P EST HI 40 MIN: CPT | Mod: 25 | Performed by: NURSE PRACTITIONER

## 2021-02-17 PROCEDURE — 93005 ELECTROCARDIOGRAM TRACING: CPT | Performed by: EMERGENCY MEDICINE

## 2021-02-17 PROCEDURE — 99283 EMERGENCY DEPT VISIT LOW MDM: CPT

## 2021-02-17 PROCEDURE — 84484 ASSAY OF TROPONIN QUANT: CPT

## 2021-02-17 PROCEDURE — 85025 COMPLETE CBC W/AUTO DIFF WBC: CPT

## 2021-02-17 PROCEDURE — 80053 COMPREHEN METABOLIC PANEL: CPT

## 2021-02-17 PROCEDURE — 93000 ELECTROCARDIOGRAM COMPLETE: CPT | Performed by: NURSE PRACTITIONER

## 2021-02-17 PROCEDURE — 81002 URINALYSIS NONAUTO W/O SCOPE: CPT | Performed by: NURSE PRACTITIONER

## 2021-02-17 SDOH — ECONOMIC STABILITY: TRANSPORTATION INSECURITY
IN THE PAST 12 MONTHS, HAS LACK OF TRANSPORTATION KEPT YOU FROM MEETINGS, WORK, OR FROM GETTING THINGS NEEDED FOR DAILY LIVING?: NO

## 2021-02-17 SDOH — ECONOMIC STABILITY: TRANSPORTATION INSECURITY
IN THE PAST 12 MONTHS, HAS THE LACK OF TRANSPORTATION KEPT YOU FROM MEDICAL APPOINTMENTS OR FROM GETTING MEDICATIONS?: NO

## 2021-02-17 ASSESSMENT — ENCOUNTER SYMPTOMS
HEADACHES: 0
WEAKNESS: 0
MYALGIAS: 0
CONSTIPATION: 0
CHILLS: 0
ABDOMINAL PAIN: 0
FOCAL WEAKNESS: 0
ABDOMINAL PAIN: 0
WHEEZING: 0
SHORTNESS OF BREATH: 1
VISUAL CHANGE: 0
PALPITATIONS: 0
NAUSEA: 1
DIZZINESS: 1
HEADACHES: 1
SORE THROAT: 0
BACK PAIN: 0
TINGLING: 0
SENSORY CHANGE: 0
NAUSEA: 0
COUGH: 0
DIZZINESS: 1
SEIZURES: 0
FEVER: 0
HEARTBURN: 0
ORTHOPNEA: 0
VOMITING: 0
MEMORY LOSS: 0
VOMITING: 0
SPEECH CHANGE: 0
DIARRHEA: 0
FATIGUE: 1

## 2021-02-17 ASSESSMENT — FIBROSIS 4 INDEX
FIB4 SCORE: 1.62
FIB4 SCORE: 1.62

## 2021-02-17 NOTE — DISCHARGE PLANNING
Received incoming call from [facility] to schedule patient with cards. CHW met pt and her son at bedside to schedule appointment. Patient has been scheduled for cards 2.25.21 at 2:20pm. Patient's son stated he will be taking her to the appointment.

## 2021-02-17 NOTE — PROGRESS NOTES
Subjective:      [place] Ernst is a 80 y.o. female who presents with Dizziness (woke up with dizziness.) and Knee Pain (R knee pain)        Patient is a pleasant 80-year-old female with history of dementia, COPD, dyslipidemia, atherosclerosis of aorta and essential hypertension.  She is brought in by her son today with complaint of dizziness and increased confusion that started this morning.  Patient is a poor historian and son provides most of the history today.  Patient normally goes to an adult  and per son, patient did not want to go this morning because she felt dizzy and nauseous.  Patient also tells me that she has a headache.  Son does state that she seems more confused than normal.  He does also state that she may have had an episode of dizziness about a week ago but they related that to her not wanting to go to her adult .  Son states patient has not had any fever, chills, vomiting, chest pain or recent falls.  Denies any dysuria.    Patient also has a complaint of right knee pain which has been on and off intermittently.  Patient has been seen by PCP for this recently.  She does appear to have tenderness to palpation generalized to the right lower extremity and grimaces with palpation of the entire knee, calf and overlying the tib/fib.    Dizziness  This is a new problem. The current episode started today. The problem occurs intermittently. The problem has been unchanged. Associated symptoms include fatigue, headaches and nausea. Pertinent negatives include no abdominal pain, chest pain, chills, congestion, coughing, fever, myalgias, rash, sore throat, urinary symptoms, visual change or vomiting. The symptoms are aggravated by walking. She has tried nothing for the symptoms. The treatment provided no relief.       Review of Systems   Constitutional: Positive for fatigue and malaise/fatigue. Negative for chills and fever.   HENT: Negative for congestion, ear pain and sore throat.     Respiratory: Positive for shortness of breath ( Chronic). Negative for cough and wheezing.    Cardiovascular: Negative for chest pain, palpitations, orthopnea and leg swelling.   Gastrointestinal: Positive for nausea. Negative for abdominal pain, constipation, diarrhea, heartburn and vomiting.   Genitourinary: Negative for dysuria.   Musculoskeletal: Negative for back pain, joint pain and myalgias.   Skin: Negative for rash.   Neurological: Positive for dizziness and headaches. Negative for tingling.   Psychiatric/Behavioral: Negative for memory loss and suicidal ideas.   All other systems reviewed and are negative.         Objective:     Pulse 66   Temp 36.6 °C (97.8 °F) (Temporal)   Resp 14   Wt 72.6 kg (160 lb)   SpO2 94%   BMI 26.63 kg/m²      Physical Exam  Vitals reviewed.   Constitutional:       General: She is not in acute distress.     Appearance: Normal appearance. She is not ill-appearing.   HENT:      Head: Normocephalic and atraumatic.      Right Ear: External ear normal.      Left Ear: External ear normal.   Eyes:      Extraocular Movements: Extraocular movements intact.      Pupils: Pupils are equal, round, and reactive to light.   Neck:      Vascular: No carotid bruit.   Cardiovascular:      Rate and Rhythm: Normal rate and regular rhythm.      Pulses: Normal pulses.      Heart sounds: No friction rub. No gallop.    Pulmonary:      Effort: Pulmonary effort is normal. No respiratory distress.      Breath sounds: Normal breath sounds.   Abdominal:      General: Bowel sounds are normal. There is no distension.      Palpations: Abdomen is soft. There is no mass.   Musculoskeletal:         General: No tenderness. Normal range of motion.      Cervical back: Normal range of motion and neck supple. No rigidity.      Right lower leg: No edema.      Left lower leg: No edema.   Skin:     General: Skin is warm and dry.   Neurological:      Mental Status: She is alert. She is disoriented and confused.       GCS: GCS eye subscore is 4. GCS verbal subscore is 5. GCS motor subscore is 6.      Cranial Nerves: No dysarthria or facial asymmetry.      Motor: Weakness present.   Psychiatric:         Mood and Affect: Mood normal.         Behavior: Behavior normal.            EKG Interpretation-HR is 86 normal EKG, normal sinus rhythm, unchanged from previous tracings    Lab Results/POC Test Results   Results for orders placed or performed in visit on 02/17/21   POCT Urinalysis   Result Value Ref Range    POC Color yellow Negative    POC Appearance clear Negative    POC Leukocyte Esterase neg Negative    POC Nitrites neg Negative    POC Urobiligen 1.0 Negative (0.2) mg/dL    POC Protein neg Negative mg/dL    POC Urine PH 7.0 5.0 - 8.0    POC Blood tr Negative    POC Specific Gravity 1.020 <1.005 - >1.030    POC Ketones neg Negative mg/dL    POC Bilirubin neg Negative mg/dL    POC Glucose neg Negative mg/dL            Assessment/Plan:         1. Dizziness  EKG    POCT Urinalysis   2. Acute pain of right knee       Negative UA    Differential diagnosis, natural history, supportive care, and indications for immediate follow-up discussed at length.     I have discussed my concerns with patients son for acute onset of dizziness and worsening confusion. Patient has a negative urinalysis for UTI.  I am concerned for acute CVA vs cardiac etiology at this time  At this time, I feel the patient requires a higher level of care including closer monitoring, stat lab work and/or imaging for further evaluation for complaints of new onset dizziness with worsening confusion.This has been discussed with the patient and they state agreement and understanding.  The patient is in no acute distress upon clinic departure and will go directly to ED without delay driven by son in POV.

## 2021-02-17 NOTE — ED TRIAGE NOTES
"81 y/o female sent in from Urgent Care for evaluation of episodes of dizziness and headache. Pt brought in by her son who states that pt c/o episodes of dizziness to keep her home from her adult , she had similar complaints last week but symptoms resolved. He states pt was \"fine\" last night but began to c/o symptoms again this morning. Pt was seen at urgent care and evaluated for possible UTI, sent here for further evaluation. Pt states she has a very mild headache and denies dizziness at this time, she states \"I just feel tired\".  "

## 2021-02-18 ENCOUNTER — TELEPHONE (OUTPATIENT)
Dept: CARDIOLOGY | Facility: MEDICAL CENTER | Age: 81
End: 2021-02-18

## 2021-02-18 NOTE — ED NOTES
Pt road tested, ambulating okay. States she still has a headache, but would like to go home. Son states she appears to be at her baseline and says she is behaving normally. Okay for DC. MD aware.     Patient understands discharge instructions. Given all DC education, prescriptions, f/u appointments. Pt verbalized understanding.  In no distress. IV and telemetry dc'd. Has all belongings.  Ambulating well with steady gait. Will return for worsening symptoms.

## 2021-02-18 NOTE — TELEPHONE ENCOUNTER
Attempted to call patient to see if she has followed with a cardiologist in the past to get records prior to new patient appt with [facility].     Spoke with son, Devon, who states patient has not seen a cardiologist in the past. Confirmed appointment date and time.

## 2021-04-02 ENCOUNTER — PATIENT MESSAGE (OUTPATIENT)
Dept: HEALTH INFORMATION MANAGEMENT | Facility: OTHER | Age: 81
End: 2021-04-02

## 2021-04-03 ENCOUNTER — HOSPITAL ENCOUNTER (OUTPATIENT)
Dept: RADIOLOGY | Facility: MEDICAL CENTER | Age: 81
End: 2021-04-03
Attending: NURSE PRACTITIONER
Payer: MEDICARE

## 2021-04-03 ENCOUNTER — OFFICE VISIT (OUTPATIENT)
Dept: URGENT CARE | Facility: PHYSICIAN GROUP | Age: 81
End: 2021-04-03
Payer: MEDICARE

## 2021-04-03 VITALS
WEIGHT: 172 LBS | HEART RATE: 71 BPM | HEIGHT: 65 IN | OXYGEN SATURATION: 94 % | BODY MASS INDEX: 28.66 KG/M2 | DIASTOLIC BLOOD PRESSURE: 70 MMHG | TEMPERATURE: 98.7 F | RESPIRATION RATE: 17 BRPM | SYSTOLIC BLOOD PRESSURE: 128 MMHG

## 2021-04-03 DIAGNOSIS — M25.532 LEFT WRIST PAIN: ICD-10-CM

## 2021-04-03 DIAGNOSIS — M79.632 LEFT FOREARM PAIN: ICD-10-CM
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
## 2021-04-03 DIAGNOSIS — S52.552A OTHER CLOSED EXTRA-ARTICULAR FRACTURE OF DISTAL END OF LEFT RADIUS, INITIAL ENCOUNTER: ICD-10-CM

## 2021-04-03 PROCEDURE — 73090 X-RAY EXAM OF FOREARM: CPT | Mod: LT

## 2021-04-03 PROCEDURE — 99215 OFFICE O/P EST HI 40 MIN: CPT | Performed by: NURSE PRACTITIONER

## 2021-04-03 PROCEDURE — 73110 X-RAY EXAM OF WRIST: CPT | Mod: LT

## 2021-04-03 ASSESSMENT — ENCOUNTER SYMPTOMS
SENSORY CHANGE: 0
SPEECH CHANGE: 0
HEADACHES: 0
VOMITING: 0
FALLS: 1
TINGLING: 0
BLURRED VISION: 0
WEAKNESS: 0
DIZZINESS: 0
NAUSEA: 0
CONSTITUTIONAL NEGATIVE: 1
LOSS OF CONSCIOUSNESS: 0

## 2021-04-03 ASSESSMENT — VISUAL ACUITY: OU: 1

## 2021-04-03 ASSESSMENT — FIBROSIS 4 INDEX: FIB4 SCORE: 1.64

## 2021-04-03 NOTE — PROGRESS NOTES
Subjective:     [place] Ernst is a 80 y.o. female who presents for Arm Injury (L arm injury during fall, L side head on portable tv tray)       Arm Injury  This is a new problem. Pertinent negatives include no headaches, nausea, vomiting or weakness.     Patient brought in by her son.  Patient lives with son who takes care of her.  Son reports that this morning, patient was moving around in her room when she tripped on a TV tray table.  Patient reports hurting her left arm and wrist.  Reports pain and tenderness there.  Also bumped the left side of her head.  Otherwise, denies LOC, headaches, nausea, vomiting, vision changes, dizziness, or other symptoms.  Patient acting appropriately.    Patient was screened prior to rooming and son denied COVID-19 diagnosis or contact with a person who has been diagnosed or is suspected to have COVID-19. During this visit, appropriate PPE was worn, hand hygiene was performed, and the patient and any visitors were masked.     PMH:  has a past medical history of Arthritis, Asthma, Balance problem (2/11/2021), Breath shortness, COPD (chronic obstructive pulmonary disease) (HCC), Dental disorder, Disorder of thyroid, Emphysema of lung (HCC), High cholesterol, Hypertension, and Pain.    MEDS:   Current Outpatient Medications:   •  folic acid (FOLVITE) 1 MG Tab, Take 1 tablet by mouth every day., Disp: 90 tablet, Rfl: 3  •  levothyroxine (SYNTHROID) 125 MCG Tab, Take 1 tablet by mouth Every morning on an empty stomach., Disp: 90 tablet, Rfl: 3  •  memantine (NAMENDA) 10 MG Tab, Take 1 tablet by mouth 2 times a day., Disp: 90 tablet, Rfl: 3  •  metoprolol SR (TOPROL XL) 100 MG TABLET SR 24 HR, Take 1 tablet by mouth every day., Disp: 90 tablet, Rfl: 3  •  pravastatin (PRAVACHOL) 40 MG tablet, TAKE ONE TABLET BY MOUTH ONCE DAILY, Disp: 100 tablet, Rfl: 3  •  citalopram (CELEXA) 20 MG Tab, Take 1 tablet by mouth every day., Disp: 90 tablet, Rfl: 3  •  amLODIPine (NORVASC) 10 MG Tab,  Take 1 tablet by mouth once daily, Disp: 90 tablet, Rfl: 1  •  Fluticasone-Umeclidin-Vilant (TRELEGY ELLIPTA) 100-62.5-25 MCG/INH AEROSOL POWDER, BREATH ACTIVATED, Inhale 1 Puff every day., Disp: 1 Each, Rfl: 11  •  omeprazole (PRILOSEC) 40 MG delayed-release capsule, Take 1 capsule by mouth every day., Disp: 90 capsule, Rfl: 3  •  Cholecalciferol (VITAMIN D3) 125 MCG (5000 UT) Cap, Take 1 capsule by mouth every day., Disp: 30 capsule, Rfl: 11  •  albuterol (PROVENTIL) 2.5mg/3ml Nebu Soln solution for nebulization, USE 3 ML VIA NEBULIZER EVERY 4 HOURS AS NEEDED FOR SHORTNESS OF BREATH, Disp: 300 mL, Rfl: 0  •  NON SPECIFIED, Albeterol inhaler 2 puffs every 6 hours. As needed for shortness of breath. Ok to administer while at more to life., Disp: 1 Each, Rfl: 1  •  albuterol 108 (90 Base) MCG/ACT Aero Soln inhalation aerosol, Inhale 2 Puffs by mouth every four hours as needed for Shortness of Breath., Disp: 8.5 g, Rfl: 11  •  acetaminophen (TYLENOL) 500 MG Tab, Take 1,000 mg by mouth every 6 hours as needed for Moderate Pain., Disp: 30 Tab, Rfl: 0    ALLERGIES: No Known Allergies    SURGHX:   Past Surgical History:   Procedure Laterality Date   • TEMPORAL ARTERY BIOPSY Left 2018    Procedure: TEMPORAL ARTERY BIOPSY;  Surgeon: Orestes Mina M.D.;  Location: SURGERY [facility];  Service: General   • GYN SURGERY         • HYSTERECTOMY, TOTAL ABDOMINAL       SOCHX:  reports that she has never smoked. She has never used smokeless tobacco. She reports that she does not drink alcohol and does not use drugs.     FH: Reviewed with patient's son, not pertinent to this visit.    Review of Systems   Constitutional: Negative.    Eyes: Negative for blurred vision.   Gastrointestinal: Negative for nausea and vomiting.   Musculoskeletal: Positive for falls.        Left forearm, wrist injury, pain; mild left temple pain   Neurological: Negative for dizziness, tingling, sensory change, speech change, loss  "of consciousness, weakness and headaches.   All other systems reviewed and are negative.    Additional details per HPI.      Objective:     /70 (BP Location: Right arm, Patient Position: Sitting, BP Cuff Size: Adult)   Pulse 71   Temp 37.1 °C (98.7 °F) (Temporal)   Resp 17   Ht 1.651 m (5' 5\")   Wt 78 kg (172 lb)   SpO2 94%   BMI 28.62 kg/m²     Physical Exam  Vitals reviewed.   Constitutional:       General: She is not in acute distress.     Appearance: She is well-developed. She is not ill-appearing or toxic-appearing.   HENT:      Head: Normocephalic and atraumatic. No raccoon eyes, Booth's sign, abrasion, contusion or laceration.      Comments: Mild tenderness over left lateral temple, no deformity     Right Ear: External ear normal.      Left Ear: External ear normal.   Eyes:      General: Vision grossly intact.      Extraocular Movements: Extraocular movements intact.      Conjunctiva/sclera: Conjunctivae normal.   Cardiovascular:      Rate and Rhythm: Normal rate.      Pulses: Normal pulses.   Pulmonary:      Effort: Pulmonary effort is normal. No respiratory distress.   Musculoskeletal:         General: No deformity.      Left shoulder: Normal.      Left upper arm: Normal.      Left elbow: Normal. No swelling, deformity or lacerations. Normal range of motion. No tenderness.      Left forearm: Tenderness present. No swelling, deformity or lacerations.      Left wrist: Tenderness present. No swelling or deformity. Decreased range of motion (Mild, due to pain).      Left hand: Normal. No swelling, deformity, lacerations or tenderness. Normal range of motion. Normal strength. Normal sensation. Normal capillary refill.      Cervical back: Normal range of motion.   Skin:     General: Skin is warm and dry.      Capillary Refill: Capillary refill takes less than 2 seconds.      Coloration: Skin is not pale.      Findings: No bruising, erythema or rash.   Neurological:      Mental Status: She is alert " and oriented to person, place, and time.      Sensory: No sensory deficit.      Motor: No weakness.      Coordination: Coordination normal.   Psychiatric:         Behavior: Behavior normal. Behavior is cooperative.     X-ray of left forearm:    Details    Reading Physician Reading Date Result Priority   Kenya Avila M.D.  [phone] 4/3/[facility]21 [facility]      Narrative & Impression        4/3/2021 12:21 PM     HISTORY/REASON FOR EXAM:  Pain following trauma.     TECHNIQUE/EXAM DESCRIPTION AND NUMBER OF VIEWS:  2 views of the LEFT forearm.     COMPARISON: None     FINDINGS:     BONE MINERALIZATION: Normal.  JOINTS: Multifocal osteoarthrosis. No erosions.  FRACTURE: Acute, nondisplaced distal radial diametaphyseal fracture. No intra-articular extension.  DISLOCATION: None.  SOFT TISSUES: No mass.     IMPRESSION:     Acute, nondisplaced distal radial diametaphyseal fracture. No intra-articular extension.             Last Resulted: 04/03/21 12:46 PM        X-ray of left wrist:    Details    Reading Physician Reading Date Result Priority   Kale Angelo M.D.  [phone] 4/3/2021 [facility]      Narrative & Impression        4/3/2021 12:21 PM     HISTORY/REASON FOR EXAM:  Pain/Deformity Following Trauma.     TECHNIQUE/EXAM DESCRIPTION AND NUMBER OF VIEWS:  4 views of the LEFT wrist.     COMPARISON: None     FINDINGS:  There is an acute distal radial metaphysis axial oriented fracture which has slight impaction and dorsal buckling     No intra-articular extension is seen     No displaced ulnar fracture is visualized     Carpal relationships are normal     Osteopenia is present, decreasing sensitivity of the study for detection of acute displaced fracture.     There is severe first CMC joint space narrowing with moderate sclerosis     Some carpus lucencies are most likely from osteopenia. Erosions cannot be excluded     IMPRESSION:     Acute radial metaphysis impacted fracture does not have intra-articular  extension     Severe osteopenia             Last Resulted: 04/03/21 12:49 PM        Radiology report and images reviewed by myself. Concur with findings.      Assessment/Plan:     1. Fall, initial encounter  - DX-FOREARM LEFT; Future  - DX-WRIST-COMPLETE 3+ LEFT; Future  - REFERRAL TO ORTHOPEDICS    2. Left forearm pain  - DX-FOREARM LEFT; Future  - REFERRAL TO ORTHOPEDICS    3. Left wrist pain  - DX-WRIST-COMPLETE 3+ LEFT; Future  - REFERRAL TO ORTHOPEDICS    4. Other closed extra-articular fracture of distal end of left radius, initial encounter  - REFERRAL TO ORTHOPEDICS    Rest, ice, compression, and elevation (RICE) and over-the-counter ibuprofen and/or acetaminophen, per 's instructions, as needed for pain.    Wrist splint applied. Sling applied.    Urgent referral placed for orthopedics follow-up, evaluation, and treatment.    Differential diagnosis, natural history, supportive care, over-the-counter symptom management per 's instructions, close monitoring, and indications for immediate follow-up discussed.     Patient and son advised to: Return for 1) Symptoms that worsen/don't improve, or go to ER, 2) Follow up with primary care in 7-10 days.    All questions answered. Patient and son with the plan of care.    Discharge summary provided.    Billing note: at least 40 minutes was allotted and spent for face-to-face care with the patient as well as coordination of care such as preparing for the visit, obtaining/reviewing history, reconciling outside information, performing an exam/evaluation, reviewing unique test results/external notes from unique sources, ordering/interpreting diagnostics, ordering/administering treatments, re-evaluating the patient, collaborating/communicating with other healthcare professionals, developing a plan of care, counseling/educating the patient/caregivers/family members, updating the medical record, and ensuring accurate documentation.

## 2021-04-03 NOTE — PATIENT INSTRUCTIONS
Radial Fracture    A radial fracture is a break in the radius bone. The radius is a bone in the forearm, on the same side as the thumb. The forearm is the part of the arm that is between the elbow and the wrist. A radial fracture near the wrist (distal radialfracture) is the most common type of broken arm. A fracture can also occur near the elbow (radial head fracture).  What are the causes?  The most common cause of a radial fracture is falling with the arm outstretched. Other causes include:  · An accident, such as a car or bike accident.  · A hard, direct hit to the forearm.  What increases the risk?  You may be at greater risk for a radial fracture if you:  · Are female.  · Are an older adult.  · Play contact sports.  · Have a condition that causes your bones to become thin and brittle (osteoporosis).  What are the signs or symptoms?  A radial fracture causes pain immediately after the injury. Other signs and symptoms may include:  · An abnormal bend or bump in the arm (deformity).  · Swelling.  · Bruising.  · Numbness or tingling in your arm and hand.  · Limited movement of your arm and hand.  How is this diagnosed?  This condition may be diagnosed based on:  · Your symptoms and medical history.  · A physical exam.  · An X-ray.  How is this treated?  Treatment depends on how severe your fracture is, where it is, and how the pieces of the broken bone line up with each other (alignment).  · The first step may be for you to wear a temporary splint for a few days, until your swelling goes down. After the swelling goes down, you may get a cast, get a different type of splint, or have surgery.  · If your broken bone is in good alignment, you will need to wear a splint or cast for up to 6 weeks.  · If your broken bone is not aligned (is displaced), your health care provider will need to align the bone pieces. After alignment, you will need to wear a splint or cast for up to 6 weeks. To align your broken bone, your  health care provider may:  ? Move the bones back into position without surgery (closed reduction).  ? Perform surgery to align the fracture and fix the bone pieces into place with metal screws, plates, or wires (open reduction and internal fixation, ORIF).  ? Perform surgery to align the fracture and fix the bone pieces into place with pins that are attached to a stabilizing bar outside your skin (external fixation).  Treatment may also include:  · Having your cast changed after 2-3 weeks.  · Physical therapy.  · Follow-up visits and X-rays to make sure you are healing.  Follow these instructions at home:  If you have a splint:  · Wear it as told by your health care provider. Remove it only as told by your health care provider.  · Loosen the splint if your fingers tingle, become numb, or turn cold and blue.  · Keep the splint clean and dry.  If you have a cast:  · Do not stick anything inside the cast to scratch your skin. Doing that increases your risk for infection.  · Check the skin around the cast every day. Tell your health care provider about any concerns.  · You may put lotion on dry skin around the edges of the cast. Do not put lotion on the skin underneath the cast.  · Keep the cast clean and dry.  Bathing  · Do not take baths, swim, or use a hot tub until your health care provider approves. Ask your health care provider if you may take showers. You may only be allowed to take sponge baths.  · If your splint or cast is not waterproof:  ? Do not let it get wet.  ? Cover it with a watertight covering when you take a bath or a shower.  Activity  · Do not lift anything with your injured arm.  · Do not use the injured arm to support your body weight until your health care provider says that you can.  · Ask your health care provider what activities are safe for you during recovery, and ask what activities you need to avoid.  Managing pain, stiffness, and swelling    · If directed, put ice on painful areas:  ? If  you have a removable splint, remove it as told by your health care provider.  ? Put ice in a plastic bag.  ? Place a towel between your skin and the bag, or between your cast and the bag.  ? Leave the ice on for 20 minutes, 2-3 times a day.  · Move your fingers often to avoid stiffness and to lessen swelling.  · Raise (elevate) your arm above the level of your heart while you are sitting or lying down.  General instructions  · Do not put pressure on any part of the cast or splint until it is fully hardened, if applicable. This may take several hours.  · Take over-the-counter and prescription medicines only as told by your health care provider.  · Do not drive until your health care provider approves. You should not drive or use heavy machinery while taking prescription pain medicine.  · Do not use any products that contain nicotine or tobacco, such as cigarettes and e-cigarettes. These can delay bone healing. If you need help quitting, ask your health care provider.  · Keep all follow-up visits as told by your health care provider. This is important.  Contact a health care provider if you have:  · Pain that does not get better with medicine.  · Swelling that gets worse.  · A bad smell coming from your cast.  Get help right away if:  · You cannot move your fingers.  · You have severe pain.  · Your fingers or your hand:  ? Become numb, cold, or pale.  ? Turn a bluish color.  Summary  · A radial fracture is a break in the radius bone. The radius is in the forearm, on the same side as the thumb.  · Treatment depends on how severe your fracture is, where it is, and how the pieces of the broken bone line up with each other.  · A splint or cast may be needed to help the fracture heal. A more severe break may require surgery.  This information is not intended to replace advice given to you by your health care provider. Make sure you discuss any questions you have with your health care provider.  Document Released:  05/31/2007 Document Revised: 12/12/2018 Document Reviewed: 12/12/2018  Elsevier Patient Education © 2020 [employer]    Rest, ice, compression, and elevation (RICE) and over-the-counter ibuprofen and/or acetaminophen, per 's instructions, as needed for pain.    A referral request has been placed for orthopedics. We have a referrals department that is tasked with locating a suitable office that currently accepts patients and your insurance and you should be receiving referral information from them such as the office name, address, and number. This process usually takes around 3 business days. If you are not contacted by our referrals department, please call [phone].

## 2021-04-06 ENCOUNTER — HOSPITAL ENCOUNTER (OUTPATIENT)
Dept: RADIOLOGY | Facility: MEDICAL CENTER | Age: 81
End: 2021-04-06
Attending: FAMILY MEDICINE
Payer: MEDICARE

## 2021-04-06 ENCOUNTER — OFFICE VISIT (OUTPATIENT)
Dept: URGENT CARE | Facility: PHYSICIAN GROUP | Age: 81
End: 2021-04-06
Payer: MEDICARE

## 2021-04-06 VITALS
BODY MASS INDEX: 28.66 KG/M2 | RESPIRATION RATE: 16 BRPM | TEMPERATURE: 99.6 F | SYSTOLIC BLOOD PRESSURE: 112 MMHG | OXYGEN SATURATION: 95 % | HEART RATE: 62 BPM | DIASTOLIC BLOOD PRESSURE: 54 MMHG | HEIGHT: 65 IN | WEIGHT: 172 LBS

## 2021-04-06 DIAGNOSIS — S52.552D OTHER CLOSED EXTRA-ARTICULAR FRACTURE OF DISTAL END OF LEFT RADIUS WITH ROUTINE HEALING, SUBSEQUENT ENCOUNTER: ICD-10-CM

## 2021-04-06 DIAGNOSIS — S28.0XXA CRUSHED CHEST, INITIAL ENCOUNTER: ICD-10-CM

## 2021-04-06 DIAGNOSIS — S00.83XA CONTUSION OF FOREHEAD, INITIAL ENCOUNTER: ICD-10-CM

## 2021-04-06 PROCEDURE — 71101 X-RAY EXAM UNILAT RIBS/CHEST: CPT | Mod: LT

## 2021-04-06 PROCEDURE — 99214 OFFICE O/P EST MOD 30 MIN: CPT | Performed by: FAMILY MEDICINE

## 2021-04-06 ASSESSMENT — ENCOUNTER SYMPTOMS
COUGH: 0
SHORTNESS OF BREATH: 0
EYE REDNESS: 0
SORE THROAT: 0
VOMITING: 0
FEVER: 0
DIZZINESS: 0
MYALGIAS: 0
NAUSEA: 0
CHILLS: 0
ANOREXIA: 0

## 2021-04-06 ASSESSMENT — FIBROSIS 4 INDEX: FIB4 SCORE: 1.64

## 2021-04-06 ASSESSMENT — VISUAL ACUITY: OU: 1

## 2021-04-06 NOTE — PROGRESS NOTES
Subjective:   [place] Ernst is a 80 y.o. female who presents for Rib Injury (pt fell was seen for left wrist, pt states left ribs are now hurting, bump & bruising on R Forehead and L upper eyelid x 3 days)        Rib Injury  This is a new (Complains of fall onto left-sided chest wall and left upper extremity with resulting left radius fracture 4/3/2021, ) problem. The problem occurs intermittently. Pertinent negatives include no anorexia (Accompanied by son, states eating very well), chest pain, chills, coughing, fever, myalgias, nausea, rash, sore throat or vomiting. Associated symptoms comments: Complains of pain with deep inspiration    Patient and son have noticed gradual onset of left-sided forehead bruising. Exacerbated by: Direct pressure, deep inspiration. She has tried rest for the symptoms. The treatment provided mild relief.     PMH:  has a past medical history of Arthritis, Asthma, Balance problem (2/11/2021), Breath shortness, COPD (chronic obstructive pulmonary disease) ([facility]), Dental disorder, Disorder of thyroid, Emphysema of lung (HCC), High cholesterol, Hypertension, and Pain.  MEDS:   Current Outpatient Medications:   •  folic acid (FOLVITE) 1 MG Tab, Take 1 tablet by mouth every day., Disp: 90 tablet, Rfl: 3  •  levothyroxine (SYNTHROID) 125 MCG Tab, Take 1 tablet by mouth Every morning on an empty stomach., Disp: 90 tablet, Rfl: 3  •  memantine (NAMENDA) 10 MG Tab, Take 1 tablet by mouth 2 times a day., Disp: 90 tablet, Rfl: 3  •  metoprolol SR (TOPROL XL) 100 MG TABLET SR 24 HR, Take 1 tablet by mouth every day., Disp: 90 tablet, Rfl: 3  •  pravastatin (PRAVACHOL) 40 MG tablet, TAKE ONE TABLET BY MOUTH ONCE DAILY, Disp: 100 tablet, Rfl: 3  •  citalopram (CELEXA) 20 MG Tab, Take 1 tablet by mouth every day., Disp: 90 tablet, Rfl: 3  •  amLODIPine (NORVASC) 10 MG Tab, Take 1 tablet by mouth once daily, Disp: 90 tablet, Rfl: 1  •  Fluticasone-Umeclidin-Vilant (TRELEGY ELLIPTA) 100-62.5-25  MCG/INH AEROSOL POWDER, BREATH ACTIVATED, Inhale 1 Puff every day., Disp: 1 Each, Rfl: 11  •  omeprazole (PRILOSEC) 40 MG delayed-release capsule, Take 1 capsule by mouth every day., Disp: 90 capsule, Rfl: 3  •  Cholecalciferol (VITAMIN D3) 125 MCG (5000 UT) Cap, Take 1 capsule by mouth every day., Disp: 30 capsule, Rfl: 11  •  albuterol (PROVENTIL) 2.5mg/3ml Nebu Soln solution for nebulization, USE 3 ML VIA NEBULIZER EVERY 4 HOURS AS NEEDED FOR SHORTNESS OF BREATH, Disp: 300 mL, Rfl: 0  •  NON SPECIFIED, Albeterol inhaler 2 puffs every 6 hours. As needed for shortness of breath. Ok to administer while at more to life., Disp: 1 Each, Rfl: 1  •  albuterol 108 (90 Base) MCG/ACT Aero Soln inhalation aerosol, Inhale 2 Puffs by mouth every four hours as needed for Shortness of Breath., Disp: 8.5 g, Rfl: 11  •  acetaminophen (TYLENOL) 500 MG Tab, Take 1,000 mg by mouth every 6 hours as needed for Moderate Pain., Disp: 30 Tab, Rfl: 0  ALLERGIES: No Known Allergies  SURGHX:   Past Surgical History:   Procedure Laterality Date   • TEMPORAL ARTERY BIOPSY Left 2018    Procedure: TEMPORAL ARTERY BIOPSY;  Surgeon: Orestes Mina M.D.;  Location: SURGERY [facility];  Service: General   • GYN SURGERY         • HYSTERECTOMY, TOTAL ABDOMINAL       SOCHX:  reports that she has never smoked. She has never used smokeless tobacco. She reports that she does not drink alcohol and does not use drugs.  FH:   Family History   Problem Relation Age of Onset   • Stroke Mother    • No Known Problems Father    • Heart Disease Brother      Review of Systems   Constitutional: Negative for chills and fever.   HENT: Negative for sore throat.    Eyes: Negative for redness.   Respiratory: Negative for cough and shortness of breath.    Cardiovascular: Negative for chest pain.   Gastrointestinal: Negative for anorexia (Accompanied by son, states eating very well), nausea and vomiting.   Genitourinary: Negative for dysuria.  "  Musculoskeletal: Negative for myalgias.   Skin: Negative for rash.   Neurological: Negative for dizziness.        Objective:   /54 (BP Location: Right arm, Patient Position: Sitting, BP Cuff Size: Adult)   Pulse 62   Temp 37.6 °C (99.6 °F) (Temporal)   Resp 16   Ht 1.651 m (5' 5\")   Wt 78 kg (172 lb)   SpO2 95%   BMI 28.62 kg/m²   Physical Exam  Vitals and nursing note reviewed.   Constitutional:       General: She is not in acute distress.     Appearance: She is well-developed.   HENT:      Head: Normocephalic and atraumatic.        Right Ear: External ear normal.      Left Ear: External ear normal.      Nose: Nose normal.      Mouth/Throat:      Mouth: Mucous membranes are moist.   Eyes:      General: Vision grossly intact. Gaze aligned appropriately.      Extraocular Movements: Extraocular movements intact.      Conjunctiva/sclera: Conjunctivae normal.      Pupils: Pupils are equal, round, and reactive to light.   Cardiovascular:      Rate and Rhythm: Normal rate and regular rhythm.   Pulmonary:      Effort: Pulmonary effort is normal. No respiratory distress.      Breath sounds: Normal breath sounds. No wheezing or rhonchi.   Chest:      Chest wall: Tenderness present.       Abdominal:      General: There is no distension.   Musculoskeletal:         General: Normal range of motion.      Comments: Left wrist in splint, left upper extremity and arm sling   Skin:     General: Skin is warm and dry.   Neurological:      General: No focal deficit present.      Mental Status: She is alert and oriented to person, place, and time. Mental status is at baseline.      Gait: Gait (gait at baseline) normal.   Psychiatric:         Judgment: Judgment normal.          [id number] (WITH 1-VIEW CXR) LEFT  Order: [id number]  Status:  Final result   Visible to patient:  No (scheduled for 4/7/2021  7:51 AM) Next appt:  None Dx:  Crushed chest, initial encounter  Details    Reading Physician Reading Date Result " Priority   Nadia Gill M.D.  [phone] 4/6/2021 Urgent Care      Narrative & Impression        4/6/2021 9:24 AM     HISTORY/REASON FOR EXAM:  Pain Following Trauma.  Left-sided chest wall pain     TECHNIQUE/EXAM DESCRIPTION AND NUMBER OF VIEWS:  3 images of the left ribs and chest.     COMPARISON: 1/3/2018     FINDINGS:  No displaced rib fractures or malalignment. No pleural fluid or pneumothorax. Soft tissues are unremarkable. Calcifications are in the aorta.     IMPRESSION:     No displaced rib fractures are identified.             Last Resulted: 04/06/21  9:49 AM                 Assessment/Plan:   1. Crushed chest, initial encounter  - [id number] (WITH 1-VIEW CXR) LEFT; Future    2. Contusion of forehead, initial encounter    3. Other closed extra-articular fracture of distal end of left radius with routine healing, subsequent encounter        Medical Decision Making/Course:  In the course of preparing for this visit with review of the pertinent past medical history, recent and past clinic visits, current medications, and in the further course of obtaining the current history pertinent to the clinic visit today, performing an exam and evaluation, ordering and independently evaluating labs, tests, and/or procedures including independent interpretation and evaluation of x-ray imaging, prescribing any recommended new medications, providing any pertinent counseling and education and recommending further coordination of care including recommendation to follow-up with pending orthopedic surgery appointment for distal radius fracture care management , at least 40 minutes of total time were spent during this encounter.      Discussed close monitoring, return precautions, and supportive measures of maintaining adequate fluid hydration and caloric intake, relative rest and symptom management as needed for pain and/or fever.    Differential diagnosis, natural history, supportive care, and indications for  immediate follow-up discussed.     Advised the patient to follow-up with the primary care physician for recheck, reevaluation, and consideration of further management.    Please note that this dictation was created using voice recognition software. I have worked with consultants from the vendor as well as technical experts from [employer] [employer] to optimize the interface. I have made every reasonable attempt to correct obvious errors, but I expect that there are errors of grammar and possibly content that I did not discover before finalizing the note.

## 2021-05-17 ENCOUNTER — HOSPITAL ENCOUNTER (OUTPATIENT)
Dept: RADIOLOGY | Facility: MEDICAL CENTER | Age: 81
End: 2021-05-17
Attending: NURSE PRACTITIONER
Payer: MEDICARE

## 2021-05-17 ENCOUNTER — OFFICE VISIT (OUTPATIENT)
Dept: URGENT CARE | Facility: PHYSICIAN GROUP | Age: 81
End: 2021-05-17
Payer: MEDICARE

## 2021-05-17 VITALS
TEMPERATURE: 98.8 F | DIASTOLIC BLOOD PRESSURE: 50 MMHG | HEART RATE: 61 BPM | HEIGHT: 65 IN | OXYGEN SATURATION: 93 % | WEIGHT: 172 LBS | SYSTOLIC BLOOD PRESSURE: 120 MMHG | RESPIRATION RATE: 20 BRPM | BODY MASS INDEX: 28.66 KG/M2

## 2021-05-17 DIAGNOSIS — J44.9 CHRONIC OBSTRUCTIVE PULMONARY DISEASE, UNSPECIFIED COPD TYPE (HCC): ICD-10-CM

## 2021-05-17 DIAGNOSIS — R05.9 COUGH: ICD-10-CM

## 2021-05-17 DIAGNOSIS — J44.1 COPD EXACERBATION (HCC): ICD-10-CM

## 2021-05-17 DIAGNOSIS — J30.1 SEASONAL ALLERGIC RHINITIS DUE TO POLLEN: ICD-10-CM

## 2021-05-17 PROCEDURE — 71046 X-RAY EXAM CHEST 2 VIEWS: CPT

## 2021-05-17 PROCEDURE — 99214 OFFICE O/P EST MOD 30 MIN: CPT | Performed by: NURSE PRACTITIONER

## 2021-05-17 RX ORDER — PREDNISONE 10 MG/1
10 TABLET ORAL 2 TIMES DAILY WITH MEALS
Qty: 10 TABLET | Refills: 0 | Status: SHIPPED | OUTPATIENT
Start: 2021-05-17 | End: 2021-05-22

## 2021-05-17 RX ORDER — BENZONATATE 100 MG/1
100 CAPSULE ORAL 3 TIMES DAILY PRN
Qty: 30 CAPSULE | Refills: 0 | Status: SHIPPED | OUTPATIENT
Start: 2021-05-17 | End: 2021-08-04

## 2021-05-17 RX ORDER — DOXYCYCLINE HYCLATE 100 MG
100 TABLET ORAL 2 TIMES DAILY
Qty: 10 TABLET | Refills: 0 | Status: SHIPPED | OUTPATIENT
Start: 2021-05-17 | End: 2021-05-22

## 2021-05-17 ASSESSMENT — ENCOUNTER SYMPTOMS
COUGH: 1
ORTHOPNEA: 0
ABDOMINAL PAIN: 0
SORE THROAT: 0
NERVOUS/ANXIOUS: 0
SPUTUM PRODUCTION: 1
HEADACHES: 0
VOMITING: 0
EYE PAIN: 0
NAUSEA: 0
WEAKNESS: 0
DIZZINESS: 0
CHILLS: 0
FEVER: 0
MYALGIAS: 0
WHEEZING: 0
SHORTNESS OF BREATH: 1

## 2021-05-17 ASSESSMENT — FIBROSIS 4 INDEX: FIB4 SCORE: 1.64

## 2021-05-17 NOTE — PROGRESS NOTES
Subjective:   Florida Ernst is a 80 y.o. female who presents for Cough (x 3 days)       Cough  This is a new problem. The current episode started in the past 7 days. The problem has been unchanged. The problem occurs every few minutes. The cough is productive of sputum. Associated symptoms include nasal congestion, postnasal drip and shortness of breath. Pertinent negatives include no chest pain, chills, fever, headaches, myalgias, rash, sore throat or wheezing. Nothing aggravates the symptoms. Risk factors for lung disease include smoking/tobacco exposure (in the past). She has tried a beta-agonist inhaler, body position changes and rest for the symptoms. The treatment provided mild relief. Her past medical history is significant for environmental allergies.     Pt presents for evaluation of a new problem, together with her son they collectively report a 3-day history of productive cough with white phlegm, raspy voice, and slight increase in shortness of breath.  Patient has known CO PD and asthma, states that she overall is well controlled.  Prior to today's visit, patient was at her primary care provider who prescribed steroids, however patient has not picked them up yet.  Patient also states that she has a long history of asthma as well as environmental allergies, does not take antihistamines on a consistent basis.  Denies any known ill contacts.    Review of Systems   Constitutional: Negative for chills, fever and malaise/fatigue.   HENT: Positive for postnasal drip. Negative for congestion and sore throat.    Eyes: Negative for pain.   Respiratory: Positive for cough, sputum production and shortness of breath. Negative for wheezing.    Cardiovascular: Negative for chest pain, orthopnea and leg swelling.   Gastrointestinal: Negative for abdominal pain, nausea and vomiting.   Genitourinary: Negative for dysuria.   Musculoskeletal: Negative for myalgias.   Skin: Negative for rash.   Neurological: Negative  for dizziness, weakness and headaches.   Endo/Heme/Allergies: Positive for environmental allergies.   Psychiatric/Behavioral: The patient is not nervous/anxious.    All other systems reviewed and are negative.      MEDS:   Current Outpatient Medications:   •  predniSONE (DELTASONE) 10 MG Tab, Take 1 tablet by mouth 2 times a day with meals for 5 days., Disp: 10 tablet, Rfl: 0  •  doxycycline (VIBRAMYCIN) 100 MG Tab, Take 1 tablet by mouth 2 times a day for 5 days., Disp: 10 tablet, Rfl: 0  •  benzonatate (TESSALON) 100 MG Cap, Take 1 capsule by mouth 3 times a day as needed for Cough for up to 30 doses., Disp: 30 capsule, Rfl: 0  •  folic acid (FOLVITE) 1 MG Tab, Take 1 tablet by mouth every day., Disp: 90 tablet, Rfl: 3  •  levothyroxine (SYNTHROID) 125 MCG Tab, Take 1 tablet by mouth Every morning on an empty stomach., Disp: 90 tablet, Rfl: 3  •  memantine (NAMENDA) 10 MG Tab, Take 1 tablet by mouth 2 times a day., Disp: 90 tablet, Rfl: 3  •  metoprolol SR (TOPROL XL) 100 MG TABLET SR 24 HR, Take 1 tablet by mouth every day., Disp: 90 tablet, Rfl: 3  •  pravastatin (PRAVACHOL) 40 MG tablet, TAKE ONE TABLET BY MOUTH ONCE DAILY, Disp: 100 tablet, Rfl: 3  •  citalopram (CELEXA) 20 MG Tab, Take 1 tablet by mouth every day., Disp: 90 tablet, Rfl: 3  •  amLODIPine (NORVASC) 10 MG Tab, Take 1 tablet by mouth once daily, Disp: 90 tablet, Rfl: 1  •  Fluticasone-Umeclidin-Vilant (TRELEGY ELLIPTA) 100-62.5-25 MCG/INH AEROSOL POWDER, BREATH ACTIVATED, Inhale 1 Puff every day., Disp: 1 Each, Rfl: 11  •  omeprazole (PRILOSEC) 40 MG delayed-release capsule, Take 1 capsule by mouth every day., Disp: 90 capsule, Rfl: 3  •  Cholecalciferol (VITAMIN D3) 125 MCG (5000 UT) Cap, Take 1 capsule by mouth every day., Disp: 30 capsule, Rfl: 11  •  albuterol (PROVENTIL) 2.5mg/3ml Nebu Soln solution for nebulization, USE 3 ML VIA NEBULIZER EVERY 4 HOURS AS NEEDED FOR SHORTNESS OF BREATH, Disp: 300 mL, Rfl: 0  •  NON SPECIFIED, Albeterol  "inhaler 2 puffs every 6 hours. As needed for shortness of breath. Ok to administer while at more to life., Disp: 1 Each, Rfl: 1  •  albuterol 108 (90 Base) MCG/ACT Aero Soln inhalation aerosol, Inhale 2 Puffs by mouth every four hours as needed for Shortness of Breath., Disp: 8.5 g, Rfl: 11  •  acetaminophen (TYLENOL) 500 MG Tab, Take 1,000 mg by mouth every 6 hours as needed for Moderate Pain., Disp: 30 Tab, Rfl: 0  ALLERGIES: No Known Allergies    Patient's PMH, SocHx, SurgHx, FamHx, Drug allergies and medications were reviewed.     Objective:   /50 (BP Location: Left arm, Patient Position: Sitting, BP Cuff Size: Adult)   Pulse 61   Temp 37.1 °C (98.8 °F) (Temporal)   Resp 20   Ht 1.651 m (5' 5\")   Wt 78 kg (172 lb)   SpO2 93%   BMI 28.62 kg/m²     Physical Exam  Vitals and nursing note reviewed.   Constitutional:       General: She is awake.      Appearance: Normal appearance. She is well-developed and normal weight.   HENT:      Head: Normocephalic and atraumatic.      Right Ear: Tympanic membrane, ear canal and external ear normal.      Left Ear: Tympanic membrane, ear canal and external ear normal.      Nose: Nose normal.      Mouth/Throat:      Lips: Pink.      Mouth: Mucous membranes are moist.      Pharynx: Oropharynx is clear. Uvula midline.   Eyes:      Extraocular Movements: Extraocular movements intact.      Conjunctiva/sclera: Conjunctivae normal.      Pupils: Pupils are equal, round, and reactive to light.   Neck:      Thyroid: No thyromegaly.      Trachea: Trachea normal.   Cardiovascular:      Rate and Rhythm: Normal rate and regular rhythm.      Pulses: Normal pulses.      Heart sounds: Normal heart sounds, S1 normal and S2 normal.   Pulmonary:      Effort: Pulmonary effort is normal. No respiratory distress.      Breath sounds: Normal breath sounds. Decreased air movement present. No wheezing, rhonchi or rales.      Comments: Productive cough noted during today's visit.  Abdominal:    "   General: Bowel sounds are normal.      Palpations: Abdomen is soft.   Musculoskeletal:         General: Normal range of motion.      Cervical back: Full passive range of motion without pain, normal range of motion and neck supple.   Lymphadenopathy:      Cervical: No cervical adenopathy.   Skin:     General: Skin is warm and dry.      Capillary Refill: Capillary refill takes less than 2 seconds.   Neurological:      General: No focal deficit present.      Mental Status: She is alert and oriented to person, place, and time.      Gait: Gait is intact.   Psychiatric:         Attention and Perception: Attention and perception normal.         Mood and Affect: Mood normal.         Speech: Speech normal.         Behavior: Behavior normal. Behavior is cooperative.         Thought Content: Thought content normal.         Judgment: Judgment normal.         Assessment/Plan:   Assessment    1. COPD exacerbation (HCC)  - doxycycline (VIBRAMYCIN) 100 MG Tab; Take 1 tablet by mouth 2 times a day for 5 days.  Dispense: 10 tablet; Refill: 0  - benzonatate (TESSALON) 100 MG Cap; Take 1 capsule by mouth 3 times a day as needed for Cough for up to 30 doses.  Dispense: 30 capsule; Refill: 0    2. Chronic obstructive pulmonary disease, unspecified COPD type (HCC)  - DX-CHEST-2 VIEWS; Future  - doxycycline (VIBRAMYCIN) 100 MG Tab; Take 1 tablet by mouth 2 times a day for 5 days.  Dispense: 10 tablet; Refill: 0  - benzonatate (TESSALON) 100 MG Cap; Take 1 capsule by mouth 3 times a day as needed for Cough for up to 30 doses.  Dispense: 30 capsule; Refill: 0    3. Seasonal allergic rhinitis due to pollen    4. Cough  - DX-CHEST-2 VIEWS; Future  - doxycycline (VIBRAMYCIN) 100 MG Tab; Take 1 tablet by mouth 2 times a day for 5 days.  Dispense: 10 tablet; Refill: 0  - benzonatate (TESSALON) 100 MG Cap; Take 1 capsule by mouth 3 times a day as needed for Cough for up to 30 doses.  Dispense: 30 capsule; Refill: 0    Vital signs stable at  today's acute urgent care visit. Reviewed test results that were completed in the clinic.  Begin steroids as prescribed by her primary care provider as well as an antihistamine on a regular basis.  Should that not resolve her symptoms and/or they continue or worsen, begin antibiotics as listed.  Discussed management options (risks, benefits, and alternatives to treatment).     Advised the patient to follow-up with the primary care provider for recheck, reevaluation, and/or consideration of further management if necessary. Return to urgent care with any worsening symptoms or if there is no improvement in their current condition. Red flags discussed and indications to immediately call 911 or present to the ED.  All questions were encouraged and answered to the patient's satisfaction and understanding, and they agree to the plan of care.     I personally reviewed prior external notes and test results pertinent to today's visit.  I have independently reviewed and interpreted all diagnostics ordered during this urgent care acute visit. Time spent evaluating this patient was a minimum of 30 minutes and includes preparing for visit, counseling/education, exam, evaluation, obtaining history, and ordering lab/test/procedures.      Please note that this dictation was created using voice recognition software. I have made a reasonable attempt to correct obvious errors, but I expect that there are errors of grammar and possibly content that I did not discover before finalizing the note.

## 2021-07-30 ENCOUNTER — TELEPHONE (OUTPATIENT)
Dept: MEDICAL GROUP | Facility: MEDICAL CENTER | Age: 81
End: 2021-07-30

## 2021-07-30 NOTE — TELEPHONE ENCOUNTER
ESTABLISHED PATIENT PRE-VISIT PLANNING     Patient was NOT contacted to complete PVP.     Note: Patient will not be contacted if there is no indication to call.     1.  Reviewed notes from the last few office visits within the medical group: Yes    2.  If any orders were placed at last visit or intended to be done for this visit (i.e. 6 mos follow-up), do we have Results/Consult Notes?         •  Labs - Labs were not ordered at last office visit.  Note: If patient appointment is for lab review and patient did not complete labs, check with provider if OK to reschedule patient until labs completed.       •  Imaging - Imaging was not ordered at last office visit.       •  Referrals - No referrals were ordered at last office visit.    3. Is this appointment scheduled as a Hospital Follow-Up? No    4.  Immunizations were updated in Epic using Reconcile Outside Information activity? No    5.  Patient is due for the following Health Maintenance Topics:   Health Maintenance Due   Topic Date Due   • BONE DENSITY  06/22/2019       - Patient is up-to-date on all Health Maintenance topics. No records have been requested at this time.    6.  AHA (Pulse8) form printed for Provider? No, patient does not have any open alerts

## 2021-08-01 RX ORDER — ALBUTEROL SULFATE 2.5 MG/3ML
SOLUTION RESPIRATORY (INHALATION)
Qty: 300 ML | Refills: 0 | Status: SHIPPED | OUTPATIENT
Start: 2021-08-01 | End: 2022-03-09 | Stop reason: SDUPTHER

## 2021-08-04 ENCOUNTER — OFFICE VISIT (OUTPATIENT)
Dept: MEDICAL GROUP | Facility: MEDICAL CENTER | Age: 81
End: 2021-08-04
Payer: MEDICARE

## 2021-08-04 VITALS
RESPIRATION RATE: 16 BRPM | HEIGHT: 65 IN | SYSTOLIC BLOOD PRESSURE: 148 MMHG | WEIGHT: 170 LBS | HEART RATE: 52 BPM | OXYGEN SATURATION: 97 % | DIASTOLIC BLOOD PRESSURE: 78 MMHG | BODY MASS INDEX: 28.32 KG/M2 | TEMPERATURE: 98.4 F

## 2021-08-04 DIAGNOSIS — I10 ESSENTIAL HYPERTENSION: ICD-10-CM

## 2021-08-04 DIAGNOSIS — E03.9 ACQUIRED HYPOTHYROIDISM: ICD-10-CM

## 2021-08-04 DIAGNOSIS — E53.8 VITAMIN B12 DEFICIENCY: ICD-10-CM

## 2021-08-04 DIAGNOSIS — R73.02 IGT (IMPAIRED GLUCOSE TOLERANCE): ICD-10-CM

## 2021-08-04 DIAGNOSIS — B35.1 ONYCHOMYCOSIS: ICD-10-CM

## 2021-08-04 DIAGNOSIS — Z11.1 TUBERCULOSIS SCREENING: ICD-10-CM

## 2021-08-04 DIAGNOSIS — Z91.81 RISK FOR FALLS: ICD-10-CM

## 2021-08-04 DIAGNOSIS — F02.80 LATE ONSET ALZHEIMER'S DEMENTIA WITHOUT BEHAVIORAL DISTURBANCE (HCC): ICD-10-CM

## 2021-08-04 DIAGNOSIS — E78.5 DYSLIPIDEMIA: ICD-10-CM

## 2021-08-04 DIAGNOSIS — M25.472 LEFT ANKLE SWELLING: ICD-10-CM

## 2021-08-04 DIAGNOSIS — G30.1 LATE ONSET ALZHEIMER'S DEMENTIA WITHOUT BEHAVIORAL DISTURBANCE (HCC): ICD-10-CM

## 2021-08-04 PROCEDURE — 99214 OFFICE O/P EST MOD 30 MIN: CPT | Performed by: FAMILY MEDICINE

## 2021-08-04 ASSESSMENT — FIBROSIS 4 INDEX: FIB4 SCORE: 1.66

## 2021-08-05 NOTE — PROGRESS NOTES
Chief Complaint   Patient presents with   • Bump     left ankle, swelling x 3weeks   • Ankle Pain   • Nail Problem   • Hypothyroidism   • Dementia   • Hypertension   • Hyperlipidemia   • Hyperglycemia       Subjective:     HPI:   Florida Ernst presents today with the followin. Onychomycosis  Patient is bothered by very thick toenails on her left foot and to a lesser extent is similar problem on her right.  Patient has dementia and is having a little trouble expressing what she means by her son who is here with her today is assisting.  They would like a referral to podiatry to see if that can be improved.  I am sure it can.  Referral is discussed and placed.    2. Left ankle swelling  Patient has had left ankle swelling for at least 3 weeks.  Her son feels it may be a little bit longer.  Patient intermittently has some tenderness.  There was apparently a fall.  There is no bruising or discoloration at this time.  There is some swelling in the lateral ankle around the lateral malleolus.  This appears to be some synovial thickening from arthritis but it is unclear.  X-ray order discussed and placed.    3. Tuberculosis screening  Patient is no longer happy with the adult  arrangements.  She and her son are looking into assisted living.  He feels that she is not safe at home alone.  She agrees.  QuantiFERON testing is ordered.  This would be needed to continue the  anyway if they decide to go with that but also course would be needed for placement in assisted living.    4. Acquired hypothyroidism  Patient reports good energy level on the medication. Patient denies insomnia, tremor or change in appetite.  Patient is taking the medication on an empty stomach in the morning and waiting at least 30 minutes before eating.  Last TSH in July last year was at target.  Lab order discussed and placed.    5. Late onset Alzheimer's dementia without behavioral disturbance (HCC)  Patient does have  Alzheimer's disease.  She has been diagnosed by  neurology.  She is on memantine for this problem.  No significant behavioral disturbance has been observed.  Patient does get very easily disoriented.  She is verbal today particularly with her son, less so with me.    6. Dyslipidemia  Patient denies chest pain, chest pressure, palpitations or exertional shortness of breath. Patient denies muscle aches or muscle weakness from the pravastatin medication. Patient is a never smoker. Patient takes no aspirin daily. Patient has no history of myocardial infarction, stroke.  She does have aortic atherosclerosis.      7. Essential hypertension  HTN - Chronic condition stable. Currently taking all meds as directed.   She is not taking baby aspirin daily.   She is not monitoring BP at home.  However this is taken at adult  once monthly.  Denies symptoms low BP: light-headed, tunnel-vision, unusual fatigue.   Denies symptoms high BP:pounding headache, visual changes, palpitations, flushed face.   Denies medicine side effects: unusual fatigue, slow heartbeat, foot/leg swelling, cough.    8. IGT (impaired glucose tolerance)  Patient has history of mild impaired glucose tolerance.  Follow-up lab orders discussed and placed.  Son says she is eating very well.  Patient's eyes brightened at that discussion and she nods agreement.    9. Risk for falls  Patient is at risk for falls.  She is using a cane today.    10. Vitamin B12 deficiency  Lab order discussed and placed    Patient, her son and I have agreed that there is no good reason to continue to do bone density screening.        Patient Active Problem List    Diagnosis Date Noted   • Balance problem 02/11/2021   • History of temporal arteritis 02/11/2021   • Dyspepsia 02/11/2021   • Risk for falls 08/13/2019   • Atherosclerosis of aorta (HCC) 02/12/2019   • Mixed incontinence 08/14/2018   • Osteoporosis without current pathological fracture 06/30/2017   • Vitamin B12  deficiency 04/21/2017   • IGT (impaired glucose tolerance) 04/03/2017   • Alzheimer's dementia without behavioral disturbance (HCC) 02/08/2017   • Essential hypertension 01/25/2017   • Chronic left shoulder pain 01/25/2017   • Acquired hypothyroidism 01/25/2017   • Dyslipidemia 01/25/2017   • Episode of recurrent major depressive disorder (HCC) 01/25/2017   • Chronic obstructive pulmonary disease (HCC) 01/25/2017       Current medicines (including changes today)  Current Outpatient Medications   Medication Sig Dispense Refill   • albuterol (PROVENTIL) 2.5mg/3ml Nebu Soln solution for nebulization USE 3 ML VIA NEBULIZER EVERY 4 HOURS AS NEEDED FOR SHORTNESS OF BREATH 300 mL 0   • folic acid (FOLVITE) 1 MG Tab Take 1 tablet by mouth every day. 90 tablet 3   • levothyroxine (SYNTHROID) 125 MCG Tab Take 1 tablet by mouth Every morning on an empty stomach. 90 tablet 3   • memantine (NAMENDA) 10 MG Tab Take 1 tablet by mouth 2 times a day. 90 tablet 3   • metoprolol SR (TOPROL XL) 100 MG TABLET SR 24 HR Take 1 tablet by mouth every day. 90 tablet 3   • pravastatin (PRAVACHOL) 40 MG tablet TAKE ONE TABLET BY MOUTH ONCE DAILY 100 tablet 3   • citalopram (CELEXA) 20 MG Tab Take 1 tablet by mouth every day. 90 tablet 3   • amLODIPine (NORVASC) 10 MG Tab Take 1 tablet by mouth once daily 90 tablet 1   • Fluticasone-Umeclidin-Vilant (TRELEGY ELLIPTA) 100-62.5-25 MCG/INH AEROSOL POWDER, BREATH ACTIVATED Inhale 1 Puff every day. 1 Each 11   • omeprazole (PRILOSEC) 40 MG delayed-release capsule Take 1 capsule by mouth every day. 90 capsule 3   • Cholecalciferol (VITAMIN D3) 125 MCG (5000 UT) Cap Take 1 capsule by mouth every day. 30 capsule 11   • NON SPECIFIED Albeterol inhaler 2 puffs every 6 hours. As needed for shortness of breath. Ok to administer while at more to life. 1 Each 1   • albuterol 108 (90 Base) MCG/ACT Aero Soln inhalation aerosol Inhale 2 Puffs by mouth every four hours as needed for Shortness of Breath. 8.5 g 11  "  • acetaminophen (TYLENOL) 500 MG Tab Take 1,000 mg by mouth every 6 hours as needed for Moderate Pain. 30 Tab 0     No current facility-administered medications for this visit.       No Known Allergies    ROS: As per HPI       Objective:     /78 (BP Location: Right arm, Patient Position: Sitting, BP Cuff Size: Adult)   Pulse (!) 52   Temp 36.9 °C (98.4 °F) (Temporal)   Resp 16   Ht 1.651 m (5' 5\")   Wt 77.1 kg (170 lb)   SpO2 97%  Body mass index is 28.29 kg/m².    Physical Exam:  Constitutional: Well-developed and well-nourished. Not diaphoretic. No distress. Lucid and fluent.  Patient, son, physician and staff all wearing masks.  Skin: Skin is warm and dry. No rash noted.  Head: Atraumatic without lesions.  Eyes: Conjunctivae and extraocular motions are normal. Pupils are equal, round, and reactive to light. No scleral icterus.   Ears:  External ears unremarkable.   Neck: Supple, trachea midline. No thyromegaly present. No cervical or supraclavicular lymphadenopathy. No JVD or carotid bruits appreciated  Cardiovascular: Regular rate and rhythm.  Normal S1, S2 without murmur appreciated.  Chest: Effort normal. Clear to auscultation throughout. No adventitious sounds.   Abdomen: Soft, non tender, and without distention. Active bowel sounds in all four quadrants. No rebound, guarding, masses or hepatosplenomegaly.  Extremities: No cyanosis, clubbing, erythema, nor edema.   Neurological: Alert and oriented x 3. No tremor appreciated.  Psychiatric:  Behavior, mood, and affect are appropriate.       Assessment and Plan:     81 y.o. female with the following issues:    1. Onychomycosis  REFERRAL TO PODIATRY   2. Left ankle swelling  DX-ANKLE 3+ VIEWS LEFT   3. Tuberculosis screening  Quantiferon Gold TB (PPD)   4. Acquired hypothyroidism  TSH   5. Late onset Alzheimer's dementia without behavioral disturbance (HCC)     6. Dyslipidemia  Comp Metabolic Panel    Lipid Profile    CBC WITHOUT DIFFERENTIAL   7. " Essential hypertension  Comp Metabolic Panel    Lipid Profile    CBC WITHOUT DIFFERENTIAL   8. IGT (impaired glucose tolerance)  Comp Metabolic Panel    HEMOGLOBIN A1C   9. Risk for falls     10. Vitamin B12 deficiency  VITAMIN B12   11. Postmenopausal status           Followup: Return in about 4 months (around 12/4/2021), or if symptoms worsen or fail to improve.

## 2021-08-08 ENCOUNTER — OFFICE VISIT (OUTPATIENT)
Dept: URGENT CARE | Facility: CLINIC | Age: 81
End: 2021-08-08
Payer: MEDICARE

## 2021-08-08 VITALS
DIASTOLIC BLOOD PRESSURE: 64 MMHG | RESPIRATION RATE: 16 BRPM | OXYGEN SATURATION: 94 % | HEIGHT: 65 IN | BODY MASS INDEX: 28.32 KG/M2 | HEART RATE: 60 BPM | WEIGHT: 170 LBS | TEMPERATURE: 97.9 F | SYSTOLIC BLOOD PRESSURE: 100 MMHG

## 2021-08-08 DIAGNOSIS — S51.812A LACERATION OF LEFT FOREARM, INITIAL ENCOUNTER: ICD-10-CM

## 2021-08-08 PROCEDURE — 12002 RPR S/N/AX/GEN/TRNK2.6-7.5CM: CPT | Performed by: FAMILY MEDICINE

## 2021-08-08 ASSESSMENT — FIBROSIS 4 INDEX: FIB4 SCORE: 1.66

## 2021-08-09 NOTE — PROGRESS NOTES
[facility] MEDICAL       PATIENT INFORMATION SHEET:   WELL CHILD EXAM      Nicki  2023  There were no vitals taken for this visit.    NUTRITION:  • Continue providing formula or breastmilk  o Formula:  Mix 1 scoop per 2 ounces (unless given special instructions)  o Vitamin D is required for breastfeeding infants (or those getting less than 32 oz formula daily)  - Baby needs 400 international units of Vit D by mouth or mother can take 6400 international units of Vitamin D3 supplement per day  o Breastfeeding questions?  Dr. Vira Caballero and Dr. Amelia Morrow specialize in Breastfeeding Medicine and see patients at the [facility] and [facility] sites.  Call Central Scheduling or ask your doctor for more information.  o Check www.uniRow.MyAGENT for a listing of resources near you  • No solid foods until 4-6 months; no water or juice    GROWTH AND DEVELOPMENT:  Your child should be able to:  • Regard your face, alert to sounds, hands usually in fists, may lift head    ANTICIPATORY GUIDANCE:  • Read, talk, and sing to your baby to help develop their senses  • Tummy time:  Infants should get 30-60 minutes a day on their stomach on a flat surface.  Start with shorter intervals a few times a day and work up to an hour.  o To help decrease flatness of head:  Try to feed your baby in different positions (alternating arms), alternate sleeping positions in the crib or bassinet (which side head is on), and use carriers (as opposed to bouncers/car seats) to avoid time spent lying on back of head    SAFETY:  • Back to sleep; firm mattress; no pillows or blankets; crib slots < 2 ? in  • Try to keep room temp between 68-74° F for comfort  • Car seat must be rear-facing in back seat until 2 years of age (www.healthychildren.org or www.safercar.gov); or call 7-295-MGMG-CHECK  • Never leave infant alone or unsupervised (may wiggle or scoot off surface)  • Have working smoke detectors; Place fire extinguishers  Subjective:      Chief Complaint   Patient presents with   • Laceration     (L) arm                 Laceration   The incident occurred less than 1 hour ago.  location:   left forearm.   The pt is not sure how it happened - she has dementia.         Social History     Tobacco Use   • Smoking status: Never Smoker   • Smokeless tobacco: Never Used   Vaping Use   • Vaping Use: Never used   Substance Use Topics   • Alcohol use: No   • Drug use: No           Past Medical History:   Diagnosis Date   • Arthritis     hands   • Asthma     inhalers   • Balance problem 2/11/2021   • Breath shortness    • COPD (chronic obstructive pulmonary disease) ([facility])    • Dental disorder     partial-doesnt wear   • Disorder of thyroid    • Emphysema of lung ([facility])    • High cholesterol    • Hypertension    • Pain     feet         Current Outpatient Medications on File Prior to Visit   Medication Sig Dispense Refill   • albuterol (PROVENTIL) 2.5mg/3ml Nebu Soln solution for nebulization USE 3 ML VIA NEBULIZER EVERY 4 HOURS AS NEEDED FOR SHORTNESS OF BREATH 300 mL 0   • folic acid (FOLVITE) 1 MG Tab Take 1 tablet by mouth every day. 90 tablet 3   • levothyroxine (SYNTHROID) 125 MCG Tab Take 1 tablet by mouth Every morning on an empty stomach. 90 tablet 3   • memantine (NAMENDA) 10 MG Tab Take 1 tablet by mouth 2 times a day. 90 tablet 3   • metoprolol SR (TOPROL XL) 100 MG TABLET SR 24 HR Take 1 tablet by mouth every day. 90 tablet 3   • pravastatin (PRAVACHOL) 40 MG tablet TAKE ONE TABLET BY MOUTH ONCE DAILY 100 tablet 3   • citalopram (CELEXA) 20 MG Tab Take 1 tablet by mouth every day. 90 tablet 3   • amLODIPine (NORVASC) 10 MG Tab Take 1 tablet by mouth once daily 90 tablet 1   • Fluticasone-Umeclidin-Vilant (TRELEGY ELLIPTA) 100-62.5-25 MCG/INH AEROSOL POWDER, BREATH ACTIVATED Inhale 1 Puff every day. 1 Each 11   • omeprazole (PRILOSEC) 40 MG delayed-release capsule Take 1 capsule by mouth every day. 90 capsule 3   • Cholecalciferol  "(VITAMIN D3) 125 MCG (5000 UT) Cap Take 1 capsule by mouth every day. 30 capsule 11   • NON SPECIFIED Albeterol inhaler 2 puffs every 6 hours. As needed for shortness of breath. Ok to administer while at more to life. 1 Each 1   • albuterol 108 (90 Base) MCG/ACT Aero Soln inhalation aerosol Inhale 2 Puffs by mouth every four hours as needed for Shortness of Breath. 8.5 g 11   • acetaminophen (TYLENOL) 500 MG Tab Take 1,000 mg by mouth every 6 hours as needed for Moderate Pain. 30 Tab 0     No current facility-administered medications on file prior to visit.         Review of Systems   Cardio - denies chest pain  resp - denies SOB.   Neurological: Negative for tingling, sensory change and focal weakness.   All other systems reviewed and are negative.         Objective:      /64   Pulse 60   Temp 36.6 °C (97.9 °F) (Temporal)   Resp 16   Ht 1.651 m (5' 5\")   Wt 77.1 kg (170 lb)   SpO2 94%     Physical Exam   Constitutional: pt is oriented to person, place, and time. Pt appears well-developed. No distress.   HENT:   Head: Normocephalic and atraumatic.   Eyes: Conjunctivae are normal.   Cardiovascular: Normal rate.    Pulmonary/Chest: Effort normal.   Musculoskeletal:   Pt exhibits there is a 3 cm crescent shaped, superficial skin laceration to left forearm.      .               Neurological: He is alert and oriented to person, place, and time.   Skin: Skin is warm. Pt is not diaphoretic. No erythema.   Psychiatric:  behavior is normal.   Nursing note and vitals reviewed.              Assessment/Plan:                  1. Laceration of left forearm, initial encounter                The wound was thoroughly irrigated with copious amount of normal saline.   Area was then prepped in the usual sterile fashion.    Wound was cleansed   Wound explored and found to be relatively superficial and no foreign bodies appreciated.  I approximated the wound edges and closed the laceration with several layers of dermabond " [phone]  o Poison Control:  [phone]  • Websites:  o American Academy of Pediatrics website:  www.aap.org  • Books (found at www.aap.org or in bookstores)  o Your Baby’s First Year by Sourav Raymundo M.D.  o Caring for your Baby and Young Child by Sourav Raymundo M.D  o Happiest Baby/MD     reinforced with alda lopes.  Area was then cleansed and dressed.    Wound care instructions and ER precautions given.   RTC in 7-10 d for wound check     Family opted not to update TDaP today.

## 2021-08-14 DIAGNOSIS — G30.1 LATE ONSET ALZHEIMER'S DISEASE WITHOUT BEHAVIORAL DISTURBANCE (HCC): ICD-10-CM

## 2021-08-14 DIAGNOSIS — F02.80 LATE ONSET ALZHEIMER'S DISEASE WITHOUT BEHAVIORAL DISTURBANCE (HCC): ICD-10-CM

## 2021-08-16 RX ORDER — MEMANTINE HYDROCHLORIDE 10 MG/1
TABLET ORAL
Qty: 90 TABLET | Refills: 3 | Status: SHIPPED | OUTPATIENT
Start: 2021-08-16 | End: 2022-03-04 | Stop reason: SDUPTHER

## 2021-08-22 ENCOUNTER — HOSPITAL ENCOUNTER (OUTPATIENT)
Facility: MEDICAL CENTER | Age: 81
End: 2021-08-22
Attending: FAMILY MEDICINE
Payer: MEDICARE

## 2021-08-22 ENCOUNTER — OFFICE VISIT (OUTPATIENT)
Dept: URGENT CARE | Facility: CLINIC | Age: 81
End: 2021-08-22
Payer: MEDICARE

## 2021-08-22 VITALS
OXYGEN SATURATION: 94 % | SYSTOLIC BLOOD PRESSURE: 134 MMHG | BODY MASS INDEX: 30.87 KG/M2 | HEART RATE: 55 BPM | TEMPERATURE: 97.5 F | WEIGHT: 174.2 LBS | RESPIRATION RATE: 12 BRPM | HEIGHT: 63 IN | DIASTOLIC BLOOD PRESSURE: 62 MMHG

## 2021-08-22 DIAGNOSIS — Z11.52 ENCOUNTER FOR SCREENING LABORATORY TESTING FOR COVID-19 VIRUS: ICD-10-CM

## 2021-08-22 PROCEDURE — U0005 INFEC AGEN DETEC AMPLI PROBE: HCPCS

## 2021-08-22 PROCEDURE — 99212 OFFICE O/P EST SF 10 MIN: CPT | Mod: CS | Performed by: FAMILY MEDICINE

## 2021-08-22 PROCEDURE — U0003 INFECTIOUS AGENT DETECTION BY NUCLEIC ACID (DNA OR RNA); SEVERE ACUTE RESPIRATORY SYNDROME CORONAVIRUS 2 (SARS-COV-2) (CORONAVIRUS DISEASE [COVID-19]), AMPLIFIED PROBE TECHNIQUE, MAKING USE OF HIGH THROUGHPUT TECHNOLOGIES AS DESCRIBED BY CMS-2020-01-R: HCPCS

## 2021-08-22 ASSESSMENT — ENCOUNTER SYMPTOMS
FEVER: 0
VOMITING: 0
COUGH: 0
SORE THROAT: 0

## 2021-08-22 ASSESSMENT — FIBROSIS 4 INDEX: FIB4 SCORE: 1.66

## 2021-08-22 NOTE — PROGRESS NOTES
Subjective:     Florida Ernst is a 81 y.o. female who presents for Other (Covid test needed prior to relocating to an assisted living facility.)    HPI  Pt presents for evaluation of an acute problem  Patient here for COVID-19 testing  Patient about to relocate to an assisted living facility and needs COVID-19 testing prior to relocating  Patient has not been ill recently  No cough, shortness of breath, or nasal congestion  No other acute complaints    Review of Systems   Constitutional: Negative for fever.   HENT: Negative for sore throat.    Respiratory: Negative for cough.    Gastrointestinal: Negative for vomiting.   Skin: Negative for rash.     PMH:  has a past medical history of Arthritis, Asthma, Balance problem (2/11/2021), Breath shortness, COPD (chronic obstructive pulmonary disease) ([facility]), Dental disorder, Disorder of thyroid, Emphysema of lung ([facility]), High cholesterol, Hypertension, and Pain.  MEDS:   Current Outpatient Medications:   •  memantine (NAMENDA) 10 MG Tab, TAKE 1 TABLET BY MOUTH TWICE DAILY, Disp: 90 Tablet, Rfl: 3  •  albuterol (PROVENTIL) 2.5mg/3ml Nebu Soln solution for nebulization, USE 3 ML VIA NEBULIZER EVERY 4 HOURS AS NEEDED FOR SHORTNESS OF BREATH, Disp: 300 mL, Rfl: 0  •  folic acid (FOLVITE) 1 MG Tab, Take 1 tablet by mouth every day., Disp: 90 tablet, Rfl: 3  •  levothyroxine (SYNTHROID) 125 MCG Tab, Take 1 tablet by mouth Every morning on an empty stomach., Disp: 90 tablet, Rfl: 3  •  metoprolol SR (TOPROL XL) 100 MG TABLET SR 24 HR, Take 1 tablet by mouth every day., Disp: 90 tablet, Rfl: 3  •  pravastatin (PRAVACHOL) 40 MG tablet, TAKE ONE TABLET BY MOUTH ONCE DAILY, Disp: 100 tablet, Rfl: 3  •  citalopram (CELEXA) 20 MG Tab, Take 1 tablet by mouth every day., Disp: 90 tablet, Rfl: 3  •  amLODIPine (NORVASC) 10 MG Tab, Take 1 tablet by mouth once daily, Disp: 90 tablet, Rfl: 1  •  Fluticasone-Umeclidin-Vilant (TRELEGY ELLIPTA) 100-62.5-25 MCG/INH AEROSOL POWDER, BREATH  "ACTIVATED, Inhale 1 Puff every day., Disp: 1 Each, Rfl: 11  •  omeprazole (PRILOSEC) 40 MG delayed-release capsule, Take 1 capsule by mouth every day., Disp: 90 capsule, Rfl: 3  •  Cholecalciferol (VITAMIN D3) 125 MCG (5000 UT) Cap, Take 1 capsule by mouth every day., Disp: 30 capsule, Rfl: 11  •  NON SPECIFIED, Albeterol inhaler 2 puffs every 6 hours. As needed for shortness of breath. Ok to administer while at more to life., Disp: 1 Each, Rfl: 1  •  albuterol 108 (90 Base) MCG/ACT Aero Soln inhalation aerosol, Inhale 2 Puffs by mouth every four hours as needed for Shortness of Breath., Disp: 8.5 g, Rfl: 11  •  acetaminophen (TYLENOL) 500 MG Tab, Take 1,000 mg by mouth every 6 hours as needed for Moderate Pain., Disp: 30 Tab, Rfl: 0  ALLERGIES: No Known Allergies  SURGHX:   Past Surgical History:   Procedure Laterality Date   • TEMPORAL ARTERY BIOPSY Left 2018    Procedure: TEMPORAL ARTERY BIOPSY;  Surgeon: Orestes Mina M.D.;  [facility]: SURGERY [facility];  Service: General   • GYN SURGERY         • HYSTERECTOMY, TOTAL ABDOMINAL       SOCHX:  reports that she has never smoked. She has never used smokeless tobacco. She reports that she does not drink alcohol and does not use drugs.     Objective:   /62 (BP Location: Left arm, Patient Position: Sitting, BP Cuff Size: Adult)   Pulse (!) 55   Temp 36.4 °C (97.5 °F) (Temporal)   Resp 12   Ht 1.61 m (5' 3.39\")   Wt 79 kg (174 lb 3.2 oz)   SpO2 94%   BMI 30.48 kg/m²     Physical Exam  Constitutional:       General: She is not in acute distress.     Appearance: She is well-developed. She is not diaphoretic.   HENT:      Head: Normocephalic and atraumatic.   Neck:      Trachea: No tracheal deviation.   Cardiovascular:      Rate and Rhythm: Normal rate and regular rhythm.   Pulmonary:      Effort: Pulmonary effort is normal. No respiratory distress.      Breath sounds: Normal breath sounds. No wheezing or rales.   Skin:     General: " Skin is warm and dry.      Findings: No rash.   Neurological:      Mental Status: She is alert.         Assessment/Plan:   Assessment    1. Encounter for screening laboratory testing for COVID-19 virus  - SARS-CoV-2 PCR (24 hour In-House): Collect NP swab in VTM; Future    Patient here for COVID-19 screening prior to relocating in an assisted living facility.  She has normal oxygen saturation, clear lungs, and does not have sign of an acute illness.  Do not see any signs of contagious illness that she could give to others at the assisted living facility.  Will check COVID-19 testing to ensure patient is safe to move in to new facility.

## 2021-08-23 ENCOUNTER — HOSPITAL ENCOUNTER (OUTPATIENT)
Dept: LAB | Facility: MEDICAL CENTER | Age: 81
End: 2021-08-23
Attending: FAMILY MEDICINE
Payer: MEDICARE

## 2021-08-23 ENCOUNTER — HOSPITAL ENCOUNTER (OUTPATIENT)
Dept: RADIOLOGY | Facility: MEDICAL CENTER | Age: 81
End: 2021-08-23
Attending: FAMILY MEDICINE
Payer: MEDICARE

## 2021-08-23 DIAGNOSIS — I10 ESSENTIAL HYPERTENSION: ICD-10-CM

## 2021-08-23 DIAGNOSIS — M25.472 LEFT ANKLE SWELLING: ICD-10-CM

## 2021-08-23 DIAGNOSIS — E78.5 DYSLIPIDEMIA: ICD-10-CM

## 2021-08-23 DIAGNOSIS — E53.8 VITAMIN B12 DEFICIENCY: ICD-10-CM

## 2021-08-23 DIAGNOSIS — Z11.1 TUBERCULOSIS SCREENING: ICD-10-CM

## 2021-08-23 DIAGNOSIS — Z11.52 ENCOUNTER FOR SCREENING LABORATORY TESTING FOR COVID-19 VIRUS: ICD-10-CM

## 2021-08-23 DIAGNOSIS — R73.02 IGT (IMPAIRED GLUCOSE TOLERANCE): ICD-10-CM

## 2021-08-23 DIAGNOSIS — R10.13 DYSPEPSIA: ICD-10-CM

## 2021-08-23 DIAGNOSIS — E03.9 ACQUIRED HYPOTHYROIDISM: ICD-10-CM

## 2021-08-23 PROCEDURE — 36415 COLL VENOUS BLD VENIPUNCTURE: CPT

## 2021-08-23 PROCEDURE — 86480 TB TEST CELL IMMUN MEASURE: CPT

## 2021-08-23 PROCEDURE — 73610 X-RAY EXAM OF ANKLE: CPT | Mod: LT

## 2021-08-24 LAB
GAMMA INTERFERON BACKGROUND BLD IA-ACNC: 0.05 IU/ML
M TB IFN-G BLD-IMP: NEGATIVE
M TB IFN-G CD4+ BCKGRND COR BLD-ACNC: 0 IU/ML
MITOGEN IGNF BCKGRD COR BLD-ACNC: 8.25 IU/ML
QFT TB2 - NIL TBQ2: -0.01 IU/ML

## 2021-09-28 DIAGNOSIS — I10 ESSENTIAL HYPERTENSION: ICD-10-CM

## 2021-09-30 RX ORDER — AMLODIPINE BESYLATE 10 MG/1
TABLET ORAL
Qty: 90 TABLET | Refills: 1 | Status: SHIPPED | OUTPATIENT
Start: 2021-09-30 | End: 2022-04-25

## 2021-10-13 ENCOUNTER — PATIENT MESSAGE (OUTPATIENT)
Dept: HEALTH INFORMATION MANAGEMENT | Facility: OTHER | Age: 81
End: 2021-10-13

## 2021-10-24 PROBLEM — R21 RASH: Status: ACTIVE | Noted: 2021-10-24

## 2021-10-24 PROBLEM — I48.20 CHRONIC ATRIAL FIBRILLATION (HCC): Status: ACTIVE | Noted: 2021-10-24

## 2021-10-24 PROBLEM — G63 POLYNEUROPATHY IN OTHER DISEASES CLASSIFIED ELSEWHERE (HCC): Status: ACTIVE | Noted: 2021-10-24

## 2021-12-21 ENCOUNTER — TELEPHONE (OUTPATIENT)
Dept: HEALTH INFORMATION MANAGEMENT | Facility: OTHER | Age: 81
End: 2021-12-21

## 2022-01-25 ENCOUNTER — PATIENT MESSAGE (OUTPATIENT)
Dept: HEALTH INFORMATION MANAGEMENT | Facility: OTHER | Age: 82
End: 2022-01-25
Payer: MEDICARE

## 2022-02-22 PROBLEM — I50.9 CHF (CONGESTIVE HEART FAILURE) (HCC): Status: ACTIVE | Noted: 2022-02-22

## 2022-02-22 PROBLEM — L29.9 ITCHING: Status: ACTIVE | Noted: 2022-02-22

## 2022-03-23 ENCOUNTER — APPOINTMENT (RX ONLY)
Dept: URBAN - NONMETROPOLITAN AREA CLINIC 15 | Facility: CLINIC | Age: 82
Setting detail: DERMATOLOGY
End: 2022-03-23

## 2022-03-23 DIAGNOSIS — L57.0 ACTINIC KERATOSIS: ICD-10-CM

## 2022-03-23 DIAGNOSIS — L29.89 OTHER PRURITUS: ICD-10-CM

## 2022-03-23 DIAGNOSIS — L29.8 OTHER PRURITUS: ICD-10-CM

## 2022-03-23 DIAGNOSIS — F42.4 EXCORIATION (SKIN-PICKING) DISORDER: ICD-10-CM

## 2022-03-23 DIAGNOSIS — L85.3 XEROSIS CUTIS: ICD-10-CM

## 2022-03-23 PROBLEM — S40.912A UNSPECIFIED SUPERFICIAL INJURY OF LEFT SHOULDER, INITIAL ENCOUNTER: Status: ACTIVE | Noted: 2022-03-23

## 2022-03-23 PROBLEM — S60.922A UNSPECIFIED SUPERFICIAL INJURY OF LEFT HAND, INITIAL ENCOUNTER: Status: ACTIVE | Noted: 2022-03-23

## 2022-03-23 PROCEDURE — 17003 DESTRUCT PREMALG LES 2-14: CPT

## 2022-03-23 PROCEDURE — ? COUNSELING

## 2022-03-23 PROCEDURE — 99213 OFFICE O/P EST LOW 20 MIN: CPT | Mod: 25

## 2022-03-23 PROCEDURE — 17000 DESTRUCT PREMALG LESION: CPT

## 2022-03-23 PROCEDURE — ? ADDITIONAL NOTES

## 2022-03-23 PROCEDURE — ? LIQUID NITROGEN

## 2022-03-23 ASSESSMENT — LOCATION DETAILED DESCRIPTION DERM
LOCATION DETAILED: LEFT VENTRAL DISTAL FOREARM
LOCATION DETAILED: RIGHT PROXIMAL DORSAL FOREARM
LOCATION DETAILED: LEFT RADIAL DORSAL HAND
LOCATION DETAILED: RIGHT RADIAL DORSAL HAND
LOCATION DETAILED: RIGHT VENTRAL DISTAL FOREARM
LOCATION DETAILED: RIGHT ULNAR DORSAL HAND
LOCATION DETAILED: LEFT DISTAL DORSAL FOREARM
LOCATION DETAILED: LEFT PROXIMAL DORSAL FOREARM
LOCATION DETAILED: LEFT ANTERIOR SHOULDER
LOCATION DETAILED: LEFT PROXIMAL RADIAL DORSAL FOREARM
LOCATION DETAILED: MIDDLE STERNUM
LOCATION DETAILED: RIGHT SUPERIOR MEDIAL UPPER BACK
LOCATION DETAILED: LEFT DORSAL INDEX METACARPOPHALANGEAL JOINT

## 2022-03-23 ASSESSMENT — LOCATION ZONE DERM
LOCATION ZONE: TRUNK
LOCATION ZONE: ARM
LOCATION ZONE: HAND

## 2022-03-23 ASSESSMENT — LOCATION SIMPLE DESCRIPTION DERM
LOCATION SIMPLE: CHEST
LOCATION SIMPLE: RIGHT UPPER BACK
LOCATION SIMPLE: LEFT FOREARM
LOCATION SIMPLE: RIGHT HAND
LOCATION SIMPLE: LEFT HAND
LOCATION SIMPLE: RIGHT FOREARM
LOCATION SIMPLE: LEFT SHOULDER

## 2022-03-23 NOTE — PROCEDURE: ADDITIONAL NOTES
Render Risk Assessment In Note?: no
Detail Level: Zone
Additional Notes: CARE NOTES FOR CAREGIVERS/NURSES-This pt needs to have lotion applied from neck to ankles four times a day every days.
Additional Notes: CARE NOTES FOR CAREGIVERS/NURSES-This pt needs to have lotion applied from neck to ankles four times a day every days.  The steroid cream that was prescribed by another provider can be used twice a day on the inflamed areas.

## 2022-03-23 NOTE — PROCEDURE: COUNSELING
Detail Level: Zone
Patient Specific Counseling (Will Not Stick From Patient To Patient): Lotion was applied in office with great relief to pt.

## 2022-04-21 DIAGNOSIS — R10.13 DYSPEPSIA: ICD-10-CM

## 2022-04-21 RX ORDER — OMEPRAZOLE 40 MG/1
40 CAPSULE, DELAYED RELEASE ORAL DAILY
Qty: 90 CAPSULE | Refills: 3 | Status: SHIPPED | OUTPATIENT
Start: 2022-04-21

## 2022-04-25 DIAGNOSIS — I10 ESSENTIAL HYPERTENSION: ICD-10-CM

## 2022-04-25 RX ORDER — AMLODIPINE BESYLATE 10 MG/1
TABLET ORAL
Qty: 90 TABLET | Refills: 1 | Status: SHIPPED | OUTPATIENT
Start: 2022-04-25 | End: 2022-05-05 | Stop reason: SDUPTHER

## 2022-04-26 ENCOUNTER — APPOINTMENT (RX ONLY)
Dept: URBAN - NONMETROPOLITAN AREA CLINIC 15 | Facility: CLINIC | Age: 82
Setting detail: DERMATOLOGY
End: 2022-04-26

## 2022-04-26 DIAGNOSIS — L29.89 OTHER PRURITUS: ICD-10-CM | Status: INADEQUATELY CONTROLLED

## 2022-04-26 DIAGNOSIS — L57.0 ACTINIC KERATOSIS: ICD-10-CM

## 2022-04-26 DIAGNOSIS — L82.1 OTHER SEBORRHEIC KERATOSIS: ICD-10-CM

## 2022-04-26 DIAGNOSIS — L29.8 OTHER PRURITUS: ICD-10-CM | Status: INADEQUATELY CONTROLLED

## 2022-04-26 DIAGNOSIS — L85.3 XEROSIS CUTIS: ICD-10-CM | Status: INADEQUATELY CONTROLLED

## 2022-04-26 PROCEDURE — ? COUNSELING

## 2022-04-26 PROCEDURE — 17000 DESTRUCT PREMALG LESION: CPT

## 2022-04-26 PROCEDURE — ? ADDITIONAL NOTES

## 2022-04-26 PROCEDURE — ? LIQUID NITROGEN

## 2022-04-26 PROCEDURE — 17003 DESTRUCT PREMALG LES 2-14: CPT

## 2022-04-26 PROCEDURE — 99213 OFFICE O/P EST LOW 20 MIN: CPT | Mod: 25

## 2022-04-26 ASSESSMENT — LOCATION SIMPLE DESCRIPTION DERM
LOCATION SIMPLE: RIGHT PRETIBIAL REGION
LOCATION SIMPLE: LEFT UPPER BACK
LOCATION SIMPLE: RIGHT HAND
LOCATION SIMPLE: CHEST
LOCATION SIMPLE: LEFT PRETIBIAL REGION
LOCATION SIMPLE: RIGHT UPPER BACK
LOCATION SIMPLE: LEFT HAND
LOCATION SIMPLE: ABDOMEN
LOCATION SIMPLE: LEFT FOREARM
LOCATION SIMPLE: RIGHT FOREARM

## 2022-04-26 ASSESSMENT — LOCATION DETAILED DESCRIPTION DERM
LOCATION DETAILED: LEFT VENTRAL PROXIMAL FOREARM
LOCATION DETAILED: LEFT DISTAL PRETIBIAL REGION
LOCATION DETAILED: RIGHT RADIAL DORSAL HAND
LOCATION DETAILED: LEFT PROXIMAL DORSAL FOREARM
LOCATION DETAILED: RIGHT VENTRAL PROXIMAL FOREARM
LOCATION DETAILED: RIGHT PROXIMAL DORSAL FOREARM
LOCATION DETAILED: LEFT ULNAR DORSAL HAND
LOCATION DETAILED: RIGHT DISTAL DORSAL FOREARM
LOCATION DETAILED: PERIUMBILICAL SKIN
LOCATION DETAILED: RIGHT DISTAL PRETIBIAL REGION
LOCATION DETAILED: LEFT DORSAL INDEX METACARPOPHALANGEAL JOINT
LOCATION DETAILED: RIGHT VENTRAL DISTAL FOREARM
LOCATION DETAILED: LEFT DISTAL DORSAL FOREARM
LOCATION DETAILED: RIGHT SUPERIOR MEDIAL UPPER BACK
LOCATION DETAILED: LEFT INFERIOR MEDIAL UPPER BACK
LOCATION DETAILED: LEFT RADIAL DORSAL HAND
LOCATION DETAILED: MIDDLE STERNUM
LOCATION DETAILED: LEFT VENTRAL DISTAL FOREARM

## 2022-04-26 ASSESSMENT — LOCATION ZONE DERM
LOCATION ZONE: LEG
LOCATION ZONE: HAND
LOCATION ZONE: ARM
LOCATION ZONE: TRUNK

## 2022-04-26 NOTE — PROCEDURE: LIQUID NITROGEN
Render Post-Care Instructions In Note?: no
Show Aperture Variable?: Yes
Consent: The patient's consent was obtained including but not limited to risks of crusting, scabbing, blistering, scarring, darker or lighter pigmentary change, recurrence, incomplete removal and infection.
Duration Of Freeze Thaw-Cycle (Seconds): 0
Post-Care Instructions: I reviewed with the patient in detail post-care instructions. Patient is to wear sunprotection, and avoid picking at any of the treated lesions. Pt may apply Vaseline to crusted or scabbing areas.
Detail Level: Detailed
Spray Paint Text: The liquid nitrogen was applied to the skin utilizing a spray paint frosting technique.
Medical Necessity Clause: This procedure was medically necessary because the lesions that were treated were:  If lesion does not resolve, bx is needed.
Medical Necessity Information: It is in your best interest to select a reason for this procedure from the list below. All of these items fulfill various CMS LCD requirements except the new and changing color options.

## 2022-04-26 NOTE — PROCEDURE: ADDITIONAL NOTES
Render Risk Assessment In Note?: no
Detail Level: Zone
Additional Notes: CARE NOTES FOR CAREGIVERS/NURSES-This pt needs to have lotion applied from neck to ankles four times a day every day. prescription orders were sent over from last visit, please refer to that.
Additional Notes: CARE NOTES FOR CAREGIVERS/NURSES-This pt needs to have lotion applied from neck to ankles four times a day every days.  The steroid cream that was prescribed by another provider can be used twice a day on the inflamed areas.

## 2022-04-26 NOTE — HPI: PRURITUS
How Did Your Itching Occur?: gradual in onset  (over a period of years)
How Severe Is Your Itching?: severe
Additional History: Patient is here following up on her pruritus. We ordered for [place] to lotion multiple times a day.

## 2022-04-28 DIAGNOSIS — F33.9 EPISODE OF RECURRENT MAJOR DEPRESSIVE DISORDER, UNSPECIFIED DEPRESSION EPISODE SEVERITY (HCC): ICD-10-CM

## 2022-04-28 RX ORDER — CITALOPRAM 20 MG/1
20 TABLET ORAL DAILY
Qty: 90 TABLET | Refills: 3 | Status: SHIPPED | OUTPATIENT
Start: 2022-04-28

## 2022-05-04 ENCOUNTER — TELEPHONE (OUTPATIENT)
Dept: HEALTH INFORMATION MANAGEMENT | Facility: OTHER | Age: 82
End: 2022-05-04
Payer: MEDICARE

## 2022-05-11 DIAGNOSIS — E78.5 DYSLIPIDEMIA: ICD-10-CM

## 2022-05-11 RX ORDER — PRAVASTATIN SODIUM 40 MG
TABLET ORAL
Qty: 100 TABLET | Refills: 3 | Status: SHIPPED | OUTPATIENT
Start: 2022-05-11 | End: 2022-05-18 | Stop reason: SDUPTHER
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
## 2022-12-22 NOTE — PROGRESS NOTES
Annual Health Assessment Questions:    1.  Are you currently engaging in any exercise or physical activity? Yes    2.  How would you describe your mood or emotional well-being today? good    3.  Have you had any falls in the last year? Yes    4.  Have you noticed any problems with your balance or had difficulty walking? Yes    5.  In the last six months have you experienced any leakage of urine? No    6. DPA/Advanced Directive: Patient does not have an Advanced Directive.  A packet and workshop information was given on Advanced Directives.     Oriented - self; Oriented - place; Oriented - time

## 2023-08-15 ENCOUNTER — TELEPHONE (OUTPATIENT)
Dept: HEALTH INFORMATION MANAGEMENT | Facility: OTHER | Age: 83
End: 2023-08-15
Payer: MEDICARE

## 2024-01-17 ENCOUNTER — OFFICE VISIT (OUTPATIENT)
Dept: DERMATOLOGY | Facility: IMAGING CENTER | Age: 84
End: 2024-01-17
Payer: MEDICARE

## 2024-01-17 DIAGNOSIS — L30.8 ASTEATOTIC DERMATITIS: ICD-10-CM

## 2024-01-17 PROCEDURE — 99212 OFFICE O/P EST SF 10 MIN: CPT | Performed by: NURSE PRACTITIONER

## 2024-01-17 NOTE — PROGRESS NOTES
DERMATOLOGY NOTE  NEW VISIT       Chief complaint: Rash     HPI: rash all over body , very itchy   Onset: 6 month   Aggravating factors: unknown   Alleviating factors: lotions   New creams/topicals: triamcinolone , atarax , DML liquid as needed   New medications (up to last 6 months): no  New travel: no  Other exposures: no  Treatments: no               No Known Allergies     MEDICATIONS:  Medications relevant to specialty reviewed.     REVIEW OF SYSTEMS:   Positive for skin (see HPI)  Negative for fevers and chills       EXAM:  There were no vitals taken for this visit.  Constitutional: Well-developed, well-nourished, and in no distress.     A focused skin exam was performed including the affected areas of the extremities. Notable findings on exam today listed below and/or in assessment/plan.     No visible rash present on exam  Dry skin to areas assessed    IMPRESSION / PLAN:    1. Asteatotic dermatitis, favored diagnosis  Discussed course/nature of disease  Stressed importance of emollient use  Can use TAC as previously prescribed as needed  Consider second generation antihistamine instead of hydroxyzine  Dry skin handouts given  Follow up for no improvement              Please note that this dictation was created using voice recognition software. I have made every reasonable attempt to correct obvious errors, but I expect that there are errors of grammar and possibly content that I did not discover before finalizing the note.      Return to clinic in: Return for PRN. and as needed for any new or changing skin lesions.

## 2024-03-05 ENCOUNTER — HOSPITAL ENCOUNTER (INPATIENT)
Facility: MEDICAL CENTER | Age: 84
LOS: 3 days | DRG: 481 | End: 2024-03-08
Attending: STUDENT IN AN ORGANIZED HEALTH CARE EDUCATION/TRAINING PROGRAM | Admitting: HOSPITALIST
Payer: MEDICARE

## 2024-03-05 ENCOUNTER — HOSPITAL ENCOUNTER (OUTPATIENT)
Dept: RADIOLOGY | Facility: MEDICAL CENTER | Age: 84
End: 2024-03-05

## 2024-03-05 DIAGNOSIS — R33.9 URINARY RETENTION: ICD-10-CM

## 2024-03-05 DIAGNOSIS — M80.00XA: ICD-10-CM

## 2024-03-05 DIAGNOSIS — S72.142A DISPLACED INTERTROCHANTERIC FRACTURE OF LEFT FEMUR, INITIAL ENCOUNTER FOR CLOSED FRACTURE (HCC): ICD-10-CM

## 2024-03-05 DIAGNOSIS — Z79.899 ON DEEP VEIN THROMBOSIS (DVT) PROPHYLAXIS: ICD-10-CM

## 2024-03-05 PROBLEM — Z01.818 ENCOUNTER FOR PREOPERATIVE ASSESSMENT: Status: ACTIVE | Noted: 2024-03-05

## 2024-03-05 LAB
ALBUMIN SERPL BCP-MCNC: 3.5 G/DL (ref 3.2–4.9)
ALBUMIN/GLOB SERPL: 1 G/DL
ALP SERPL-CCNC: 189 U/L (ref 30–99)
ALT SERPL-CCNC: 8 U/L (ref 2–50)
ANION GAP SERPL CALC-SCNC: 14 MMOL/L (ref 7–16)
AST SERPL-CCNC: 20 U/L (ref 12–45)
BASOPHILS # BLD AUTO: 0.7 % (ref 0–1.8)
BASOPHILS # BLD: 0.08 K/UL (ref 0–0.12)
BILIRUB SERPL-MCNC: 0.6 MG/DL (ref 0.1–1.5)
BUN SERPL-MCNC: 11 MG/DL (ref 8–22)
CALCIUM ALBUM COR SERPL-MCNC: 9.7 MG/DL (ref 8.5–10.5)
CALCIUM SERPL-MCNC: 9.3 MG/DL (ref 8.5–10.5)
CHLORIDE SERPL-SCNC: 105 MMOL/L (ref 96–112)
CO2 SERPL-SCNC: 22 MMOL/L (ref 20–33)
CREAT SERPL-MCNC: 0.42 MG/DL (ref 0.5–1.4)
EOSINOPHIL # BLD AUTO: 0.69 K/UL (ref 0–0.51)
EOSINOPHIL NFR BLD: 6.4 % (ref 0–6.9)
ERYTHROCYTE [DISTWIDTH] IN BLOOD BY AUTOMATED COUNT: 43.4 FL (ref 35.9–50)
GFR SERPLBLD CREATININE-BSD FMLA CKD-EPI: 95 ML/MIN/1.73 M 2
GLOBULIN SER CALC-MCNC: 3.5 G/DL (ref 1.9–3.5)
GLUCOSE SERPL-MCNC: 110 MG/DL (ref 65–99)
HCT VFR BLD AUTO: 40 % (ref 37–47)
HGB BLD-MCNC: 13.1 G/DL (ref 12–16)
IMM GRANULOCYTES # BLD AUTO: 0.04 K/UL (ref 0–0.11)
IMM GRANULOCYTES NFR BLD AUTO: 0.4 % (ref 0–0.9)
LACTATE SERPL-SCNC: 1.2 MMOL/L (ref 0.5–2)
LYMPHOCYTES # BLD AUTO: 1.94 K/UL (ref 1–4.8)
LYMPHOCYTES NFR BLD: 17.9 % (ref 22–41)
MCH RBC QN AUTO: 29.8 PG (ref 27–33)
MCHC RBC AUTO-ENTMCNC: 32.8 G/DL (ref 32.2–35.5)
MCV RBC AUTO: 91.1 FL (ref 81.4–97.8)
MONOCYTES # BLD AUTO: 0.97 K/UL (ref 0–0.85)
MONOCYTES NFR BLD AUTO: 9 % (ref 0–13.4)
NEUTROPHILS # BLD AUTO: 7.11 K/UL (ref 1.82–7.42)
NEUTROPHILS NFR BLD: 65.6 % (ref 44–72)
NRBC # BLD AUTO: 0 K/UL
NRBC BLD-RTO: 0 /100 WBC (ref 0–0.2)
PLATELET # BLD AUTO: 413 K/UL (ref 164–446)
PMV BLD AUTO: 9.3 FL (ref 9–12.9)
POTASSIUM SERPL-SCNC: 4 MMOL/L (ref 3.6–5.5)
PROT SERPL-MCNC: 7 G/DL (ref 6–8.2)
RBC # BLD AUTO: 4.39 M/UL (ref 4.2–5.4)
SODIUM SERPL-SCNC: 141 MMOL/L (ref 135–145)
WBC # BLD AUTO: 10.8 K/UL (ref 4.8–10.8)

## 2024-03-05 PROCEDURE — 770004 HCHG ROOM/CARE - ONCOLOGY PRIVATE *

## 2024-03-05 PROCEDURE — 80053 COMPREHEN METABOLIC PANEL: CPT

## 2024-03-05 PROCEDURE — 85610 PROTHROMBIN TIME: CPT

## 2024-03-05 PROCEDURE — 700102 HCHG RX REV CODE 250 W/ 637 OVERRIDE(OP): Performed by: HOSPITALIST

## 2024-03-05 PROCEDURE — 51798 US URINE CAPACITY MEASURE: CPT

## 2024-03-05 PROCEDURE — 83735 ASSAY OF MAGNESIUM: CPT

## 2024-03-05 PROCEDURE — 83605 ASSAY OF LACTIC ACID: CPT | Mod: 91

## 2024-03-05 PROCEDURE — 85730 THROMBOPLASTIN TIME PARTIAL: CPT

## 2024-03-05 PROCEDURE — 84100 ASSAY OF PHOSPHORUS: CPT

## 2024-03-05 PROCEDURE — 700111 HCHG RX REV CODE 636 W/ 250 OVERRIDE (IP): Performed by: HOSPITALIST

## 2024-03-05 PROCEDURE — 99223 1ST HOSP IP/OBS HIGH 75: CPT | Mod: 25,AI | Performed by: HOSPITALIST

## 2024-03-05 PROCEDURE — 36415 COLL VENOUS BLD VENIPUNCTURE: CPT

## 2024-03-05 PROCEDURE — 85025 COMPLETE CBC W/AUTO DIFF WBC: CPT

## 2024-03-05 PROCEDURE — A9270 NON-COVERED ITEM OR SERVICE: HCPCS | Performed by: HOSPITALIST

## 2024-03-05 PROCEDURE — 99497 ADVNCD CARE PLAN 30 MIN: CPT | Performed by: HOSPITALIST

## 2024-03-05 RX ORDER — CITALOPRAM 20 MG/1
20 TABLET ORAL DAILY
Status: DISCONTINUED | OUTPATIENT
Start: 2024-03-06 | End: 2024-03-08 | Stop reason: HOSPADM

## 2024-03-05 RX ORDER — ACETAMINOPHEN 500 MG
1000 TABLET ORAL EVERY 4 HOURS PRN
Status: DISCONTINUED | OUTPATIENT
Start: 2024-03-05 | End: 2024-03-06

## 2024-03-05 RX ORDER — OXYCODONE HYDROCHLORIDE 5 MG/1
5 TABLET ORAL
Status: DISCONTINUED | OUTPATIENT
Start: 2024-03-05 | End: 2024-03-08 | Stop reason: HOSPADM

## 2024-03-05 RX ORDER — AMLODIPINE BESYLATE 10 MG/1
10 TABLET ORAL DAILY
Status: DISCONTINUED | OUTPATIENT
Start: 2024-03-06 | End: 2024-03-08 | Stop reason: HOSPADM

## 2024-03-05 RX ORDER — ONDANSETRON 2 MG/ML
4 INJECTION INTRAMUSCULAR; INTRAVENOUS EVERY 4 HOURS PRN
Status: DISCONTINUED | OUTPATIENT
Start: 2024-03-05 | End: 2024-03-08 | Stop reason: HOSPADM

## 2024-03-05 RX ORDER — LEVOTHYROXINE SODIUM 0.12 MG/1
125 TABLET ORAL
Status: DISCONTINUED | OUTPATIENT
Start: 2024-03-06 | End: 2024-03-08 | Stop reason: HOSPADM

## 2024-03-05 RX ORDER — ONDANSETRON 4 MG/1
4 TABLET, ORALLY DISINTEGRATING ORAL EVERY 4 HOURS PRN
Status: DISCONTINUED | OUTPATIENT
Start: 2024-03-05 | End: 2024-03-08 | Stop reason: HOSPADM

## 2024-03-05 RX ORDER — LABETALOL HYDROCHLORIDE 5 MG/ML
10 INJECTION, SOLUTION INTRAVENOUS EVERY 4 HOURS PRN
Status: DISCONTINUED | OUTPATIENT
Start: 2024-03-05 | End: 2024-03-08 | Stop reason: HOSPADM

## 2024-03-05 RX ORDER — OMEPRAZOLE 20 MG/1
40 CAPSULE, DELAYED RELEASE ORAL DAILY
Status: DISCONTINUED | OUTPATIENT
Start: 2024-03-06 | End: 2024-03-08 | Stop reason: HOSPADM

## 2024-03-05 RX ORDER — HYDROMORPHONE HYDROCHLORIDE 1 MG/ML
0.25 INJECTION, SOLUTION INTRAMUSCULAR; INTRAVENOUS; SUBCUTANEOUS
Status: DISCONTINUED | OUTPATIENT
Start: 2024-03-05 | End: 2024-03-08 | Stop reason: HOSPADM

## 2024-03-05 RX ORDER — ACETAMINOPHEN 325 MG/1
650 TABLET ORAL EVERY 6 HOURS PRN
Status: DISCONTINUED | OUTPATIENT
Start: 2024-03-05 | End: 2024-03-05

## 2024-03-05 RX ORDER — MEMANTINE HYDROCHLORIDE 10 MG/1
10 TABLET ORAL 2 TIMES DAILY
Status: DISCONTINUED | OUTPATIENT
Start: 2024-03-05 | End: 2024-03-08 | Stop reason: HOSPADM

## 2024-03-05 RX ORDER — OXYCODONE HYDROCHLORIDE 5 MG/1
2.5 TABLET ORAL
Status: DISCONTINUED | OUTPATIENT
Start: 2024-03-05 | End: 2024-03-08 | Stop reason: HOSPADM

## 2024-03-05 RX ORDER — METOPROLOL SUCCINATE 50 MG/1
100 TABLET, EXTENDED RELEASE ORAL DAILY
Status: DISCONTINUED | OUTPATIENT
Start: 2024-03-06 | End: 2024-03-08 | Stop reason: HOSPADM

## 2024-03-05 RX ORDER — HALOPERIDOL 5 MG/ML
1 INJECTION INTRAMUSCULAR EVERY 4 HOURS PRN
Status: DISCONTINUED | OUTPATIENT
Start: 2024-03-05 | End: 2024-03-08 | Stop reason: HOSPADM

## 2024-03-05 RX ORDER — MIRTAZAPINE 15 MG/1
15 TABLET, FILM COATED ORAL
Status: DISCONTINUED | OUTPATIENT
Start: 2024-03-05 | End: 2024-03-08 | Stop reason: HOSPADM

## 2024-03-05 RX ORDER — QUETIAPINE FUMARATE 25 MG/1
50 TABLET, FILM COATED ORAL NIGHTLY
Status: DISCONTINUED | OUTPATIENT
Start: 2024-03-05 | End: 2024-03-08 | Stop reason: HOSPADM

## 2024-03-05 RX ADMIN — OXYCODONE 5 MG: 5 TABLET ORAL at 20:51

## 2024-03-05 RX ADMIN — MIRTAZAPINE 15 MG: 15 TABLET, FILM COATED ORAL at 20:51

## 2024-03-05 RX ADMIN — MEMANTINE HYDROCHLORIDE 10 MG: 10 TABLET ORAL at 22:16

## 2024-03-05 RX ADMIN — QUETIAPINE FUMARATE 50 MG: 25 TABLET ORAL at 22:16

## 2024-03-05 RX ADMIN — HALOPERIDOL LACTATE 1 MG: 5 INJECTION, SOLUTION INTRAMUSCULAR at 21:54

## 2024-03-05 RX ADMIN — HYDROMORPHONE HYDROCHLORIDE 0.25 MG: 1 INJECTION, SOLUTION INTRAMUSCULAR; INTRAVENOUS; SUBCUTANEOUS at 22:16

## 2024-03-05 ASSESSMENT — ENCOUNTER SYMPTOMS
SORE THROAT: 0
BACK PAIN: 0
CONSTIPATION: 0
CLAUDICATION: 0
TINGLING: 0
VOMITING: 0
SPUTUM PRODUCTION: 0
DOUBLE VISION: 0
SINUS PAIN: 0
ABDOMINAL PAIN: 0
EYE PAIN: 0
FLANK PAIN: 0
NECK PAIN: 0
HEMOPTYSIS: 0
BLOOD IN STOOL: 0
DIARRHEA: 0
COUGH: 0
FEVER: 0
BLURRED VISION: 0
HALLUCINATIONS: 0
DIZZINESS: 0
HEARTBURN: 0
HEADACHES: 0
PND: 0
WHEEZING: 0
ORTHOPNEA: 0
PALPITATIONS: 0
SHORTNESS OF BREATH: 0
TREMORS: 0
DIAPHORESIS: 0
MYALGIAS: 1
NAUSEA: 0
FALLS: 1
PHOTOPHOBIA: 0
STRIDOR: 0
POLYDIPSIA: 0
WEAKNESS: 0
DEPRESSION: 0
CHILLS: 0
BRUISES/BLEEDS EASILY: 0

## 2024-03-05 ASSESSMENT — PAIN DESCRIPTION - PAIN TYPE
TYPE: ACUTE PAIN
TYPE: ACUTE PAIN;DEEP SOMATIC PAIN
TYPE: ACUTE PAIN

## 2024-03-05 ASSESSMENT — LIFESTYLE VARIABLES: SUBSTANCE_ABUSE: 0

## 2024-03-05 ASSESSMENT — FIBROSIS 4 INDEX: FIB4 SCORE: 1.46
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
## 2024-03-06 ENCOUNTER — APPOINTMENT (OUTPATIENT)
Dept: RADIOLOGY | Facility: MEDICAL CENTER | Age: 84
DRG: 481 | End: 2024-03-06
Attending: ORTHOPAEDIC SURGERY
Payer: MEDICARE

## 2024-03-06 ENCOUNTER — ANESTHESIA EVENT (OUTPATIENT)
Dept: SURGERY | Facility: MEDICAL CENTER | Age: 84
DRG: 481 | End: 2024-03-06
Payer: MEDICARE

## 2024-03-06 ENCOUNTER — APPOINTMENT (OUTPATIENT)
Dept: RADIOLOGY | Facility: MEDICAL CENTER | Age: 84
DRG: 481 | End: 2024-03-06
Attending: STUDENT IN AN ORGANIZED HEALTH CARE EDUCATION/TRAINING PROGRAM
Payer: MEDICARE

## 2024-03-06 ENCOUNTER — ANESTHESIA (OUTPATIENT)
Dept: SURGERY | Facility: MEDICAL CENTER | Age: 84
DRG: 481 | End: 2024-03-06
Payer: MEDICARE

## 2024-03-06 PROBLEM — M80.00XA: Status: RESOLVED | Noted: 2024-03-05 | Resolved: 2024-03-06

## 2024-03-06 LAB
ABO + RH BLD: NORMAL
ABO GROUP BLD: NORMAL
APTT PPP: 31.1 SEC (ref 24.7–36)
BLD GP AB SCN SERPL QL: NORMAL
GLUCOSE BLD STRIP.AUTO-MCNC: 94 MG/DL (ref 65–99)
INR PPP: 1.15 (ref 0.87–1.13)
LACTATE SERPL-SCNC: 1.1 MMOL/L (ref 0.5–2)
MAGNESIUM SERPL-MCNC: 2 MG/DL (ref 1.5–2.5)
PHOSPHATE SERPL-MCNC: 3 MG/DL (ref 2.5–4.5)
PROTHROMBIN TIME: 14.9 SEC (ref 12–14.6)
RH BLD: NORMAL

## 2024-03-06 PROCEDURE — 160029 HCHG SURGERY MINUTES - 1ST 30 MINS LEVEL 4: Performed by: ORTHOPAEDIC SURGERY

## 2024-03-06 PROCEDURE — 700105 HCHG RX REV CODE 258: Performed by: ANESTHESIOLOGY

## 2024-03-06 PROCEDURE — 82962 GLUCOSE BLOOD TEST: CPT

## 2024-03-06 PROCEDURE — 160048 HCHG OR STATISTICAL LEVEL 1-5: Performed by: ORTHOPAEDIC SURGERY

## 2024-03-06 PROCEDURE — 86900 BLOOD TYPING SEROLOGIC ABO: CPT

## 2024-03-06 PROCEDURE — 73502 X-RAY EXAM HIP UNI 2-3 VIEWS: CPT | Mod: LT

## 2024-03-06 PROCEDURE — 160035 HCHG PACU - 1ST 60 MINS PHASE I: Performed by: ORTHOPAEDIC SURGERY

## 2024-03-06 PROCEDURE — 160002 HCHG RECOVERY MINUTES (STAT): Performed by: ORTHOPAEDIC SURGERY

## 2024-03-06 PROCEDURE — 160041 HCHG SURGERY MINUTES - EA ADDL 1 MIN LEVEL 4: Performed by: ORTHOPAEDIC SURGERY

## 2024-03-06 PROCEDURE — 27245 TREAT THIGH FRACTURE: CPT | Mod: LT | Performed by: ORTHOPAEDIC SURGERY

## 2024-03-06 PROCEDURE — 73700 CT LOWER EXTREMITY W/O DYE: CPT | Mod: LT

## 2024-03-06 PROCEDURE — 86850 RBC ANTIBODY SCREEN: CPT

## 2024-03-06 PROCEDURE — 160009 HCHG ANES TIME/MIN: Performed by: ORTHOPAEDIC SURGERY

## 2024-03-06 PROCEDURE — A9270 NON-COVERED ITEM OR SERVICE: HCPCS | Performed by: HOSPITALIST

## 2024-03-06 PROCEDURE — 700111 HCHG RX REV CODE 636 W/ 250 OVERRIDE (IP): Performed by: ANESTHESIOLOGY

## 2024-03-06 PROCEDURE — C1713 ANCHOR/SCREW BN/BN,TIS/BN: HCPCS | Performed by: ORTHOPAEDIC SURGERY

## 2024-03-06 PROCEDURE — A9270 NON-COVERED ITEM OR SERVICE: HCPCS | Performed by: STUDENT IN AN ORGANIZED HEALTH CARE EDUCATION/TRAINING PROGRAM

## 2024-03-06 PROCEDURE — 99233 SBSQ HOSP IP/OBS HIGH 50: CPT | Performed by: STUDENT IN AN ORGANIZED HEALTH CARE EDUCATION/TRAINING PROGRAM

## 2024-03-06 PROCEDURE — 700102 HCHG RX REV CODE 250 W/ 637 OVERRIDE(OP): Performed by: HOSPITALIST

## 2024-03-06 PROCEDURE — 99223 1ST HOSP IP/OBS HIGH 75: CPT | Mod: 57 | Performed by: ORTHOPAEDIC SURGERY

## 2024-03-06 PROCEDURE — 36415 COLL VENOUS BLD VENIPUNCTURE: CPT

## 2024-03-06 PROCEDURE — 700111 HCHG RX REV CODE 636 W/ 250 OVERRIDE (IP): Mod: JZ | Performed by: ANESTHESIOLOGY

## 2024-03-06 PROCEDURE — 770004 HCHG ROOM/CARE - ONCOLOGY PRIVATE *

## 2024-03-06 PROCEDURE — 86901 BLOOD TYPING SEROLOGIC RH(D): CPT

## 2024-03-06 PROCEDURE — 700111 HCHG RX REV CODE 636 W/ 250 OVERRIDE (IP): Mod: JZ | Performed by: HOSPITALIST

## 2024-03-06 PROCEDURE — 110371 HCHG SHELL REV 272: Performed by: ORTHOPAEDIC SURGERY

## 2024-03-06 PROCEDURE — 0QS736Z REPOSITION LEFT UPPER FEMUR WITH INTRAMEDULLARY INTERNAL FIXATION DEVICE, PERCUTANEOUS APPROACH: ICD-10-PCS | Performed by: ORTHOPAEDIC SURGERY

## 2024-03-06 PROCEDURE — 700102 HCHG RX REV CODE 250 W/ 637 OVERRIDE(OP): Performed by: STUDENT IN AN ORGANIZED HEALTH CARE EDUCATION/TRAINING PROGRAM

## 2024-03-06 DEVICE — SCREW CROSS LOCK 5MM X 32.5MM (4TX5=20): Type: IMPLANTABLE DEVICE | Site: HIP | Status: FUNCTIONAL

## 2024-03-06 DEVICE — SCREW LAG 10.5MM X 95MM (4TX2=8): Type: IMPLANTABLE DEVICE | Site: HIP | Status: FUNCTIONAL

## 2024-03-06 DEVICE — NAIL HIP 127.5 DEGREE 10MM X 180MM (4TX2=8): Type: IMPLANTABLE DEVICE | Site: HIP | Status: FUNCTIONAL

## 2024-03-06 RX ORDER — ONDANSETRON 2 MG/ML
INJECTION INTRAMUSCULAR; INTRAVENOUS PRN
Status: DISCONTINUED | OUTPATIENT
Start: 2024-03-06 | End: 2024-03-06 | Stop reason: SURG

## 2024-03-06 RX ORDER — ACETAMINOPHEN 325 MG/1
650 TABLET ORAL EVERY 6 HOURS PRN
Status: DISCONTINUED | OUTPATIENT
Start: 2024-03-11 | End: 2024-03-06

## 2024-03-06 RX ORDER — HYDRALAZINE HYDROCHLORIDE 20 MG/ML
5 INJECTION INTRAMUSCULAR; INTRAVENOUS
Status: DISCONTINUED | OUTPATIENT
Start: 2024-03-06 | End: 2024-03-06 | Stop reason: HOSPADM

## 2024-03-06 RX ORDER — CHOLECALCIFEROL (VITAMIN D3) 125 MCG
5 CAPSULE ORAL NIGHTLY
Status: DISCONTINUED | OUTPATIENT
Start: 2024-03-06 | End: 2024-03-08 | Stop reason: HOSPADM

## 2024-03-06 RX ORDER — LIDOCAINE HYDROCHLORIDE 10 MG/ML
INJECTION, SOLUTION EPIDURAL; INFILTRATION; INTRACAUDAL; PERINEURAL PRN
Status: DISCONTINUED | OUTPATIENT
Start: 2024-03-06 | End: 2024-03-06 | Stop reason: SURG

## 2024-03-06 RX ORDER — OXYCODONE HCL 5 MG/5 ML
5 SOLUTION, ORAL ORAL
Status: DISCONTINUED | OUTPATIENT
Start: 2024-03-06 | End: 2024-03-06 | Stop reason: HOSPADM

## 2024-03-06 RX ORDER — HALOPERIDOL 5 MG/ML
1 INJECTION INTRAMUSCULAR
Status: DISCONTINUED | OUTPATIENT
Start: 2024-03-06 | End: 2024-03-06 | Stop reason: HOSPADM

## 2024-03-06 RX ORDER — LABETALOL HYDROCHLORIDE 5 MG/ML
5 INJECTION, SOLUTION INTRAVENOUS
Status: DISCONTINUED | OUTPATIENT
Start: 2024-03-06 | End: 2024-03-06 | Stop reason: HOSPADM

## 2024-03-06 RX ORDER — ACETAMINOPHEN 500 MG
1000 TABLET ORAL EVERY 6 HOURS PRN
Status: DISCONTINUED | OUTPATIENT
Start: 2024-03-11 | End: 2024-03-08 | Stop reason: HOSPADM

## 2024-03-06 RX ORDER — ONDANSETRON 2 MG/ML
4 INJECTION INTRAMUSCULAR; INTRAVENOUS
Status: DISCONTINUED | OUTPATIENT
Start: 2024-03-06 | End: 2024-03-06 | Stop reason: HOSPADM

## 2024-03-06 RX ORDER — DEXAMETHASONE SODIUM PHOSPHATE 4 MG/ML
INJECTION, SOLUTION INTRA-ARTICULAR; INTRALESIONAL; INTRAMUSCULAR; INTRAVENOUS; SOFT TISSUE PRN
Status: DISCONTINUED | OUTPATIENT
Start: 2024-03-06 | End: 2024-03-06 | Stop reason: SURG

## 2024-03-06 RX ORDER — ENOXAPARIN SODIUM 100 MG/ML
40 INJECTION SUBCUTANEOUS DAILY
Status: DISCONTINUED | OUTPATIENT
Start: 2024-03-07 | End: 2024-03-08 | Stop reason: HOSPADM

## 2024-03-06 RX ORDER — SODIUM CHLORIDE, SODIUM LACTATE, POTASSIUM CHLORIDE, CALCIUM CHLORIDE 600; 310; 30; 20 MG/100ML; MG/100ML; MG/100ML; MG/100ML
INJECTION, SOLUTION INTRAVENOUS
Status: DISCONTINUED | OUTPATIENT
Start: 2024-03-06 | End: 2024-03-06 | Stop reason: SURG

## 2024-03-06 RX ORDER — SODIUM CHLORIDE, SODIUM LACTATE, POTASSIUM CHLORIDE, CALCIUM CHLORIDE 600; 310; 30; 20 MG/100ML; MG/100ML; MG/100ML; MG/100ML
INJECTION, SOLUTION INTRAVENOUS CONTINUOUS
Status: DISCONTINUED | OUTPATIENT
Start: 2024-03-06 | End: 2024-03-06 | Stop reason: HOSPADM

## 2024-03-06 RX ORDER — POLYETHYLENE GLYCOL 3350 17 G/17G
1 POWDER, FOR SOLUTION ORAL
Status: DISCONTINUED | OUTPATIENT
Start: 2024-03-06 | End: 2024-03-08 | Stop reason: HOSPADM

## 2024-03-06 RX ORDER — OXYCODONE HCL 5 MG/5 ML
2.5 SOLUTION, ORAL ORAL
Status: DISCONTINUED | OUTPATIENT
Start: 2024-03-06 | End: 2024-03-06 | Stop reason: HOSPADM

## 2024-03-06 RX ORDER — IPRATROPIUM BROMIDE AND ALBUTEROL SULFATE 2.5; .5 MG/3ML; MG/3ML
3 SOLUTION RESPIRATORY (INHALATION)
Status: DISCONTINUED | OUTPATIENT
Start: 2024-03-06 | End: 2024-03-06 | Stop reason: HOSPADM

## 2024-03-06 RX ORDER — ACETAMINOPHEN 500 MG
1000 TABLET ORAL EVERY 6 HOURS
Status: DISCONTINUED | OUTPATIENT
Start: 2024-03-06 | End: 2024-03-08 | Stop reason: HOSPADM

## 2024-03-06 RX ORDER — AMOXICILLIN 250 MG
2 CAPSULE ORAL EVERY EVENING
Status: DISCONTINUED | OUTPATIENT
Start: 2024-03-06 | End: 2024-03-08 | Stop reason: HOSPADM

## 2024-03-06 RX ORDER — CEFAZOLIN SODIUM 1 G/3ML
INJECTION, POWDER, FOR SOLUTION INTRAMUSCULAR; INTRAVENOUS PRN
Status: DISCONTINUED | OUTPATIENT
Start: 2024-03-06 | End: 2024-03-06 | Stop reason: SURG

## 2024-03-06 RX ORDER — ACETAMINOPHEN 325 MG/1
650 TABLET ORAL EVERY 6 HOURS
Status: DISCONTINUED | OUTPATIENT
Start: 2024-03-06 | End: 2024-03-06

## 2024-03-06 RX ORDER — EPHEDRINE SULFATE 50 MG/ML
5 INJECTION, SOLUTION INTRAVENOUS
Status: DISCONTINUED | OUTPATIENT
Start: 2024-03-06 | End: 2024-03-06 | Stop reason: HOSPADM

## 2024-03-06 RX ADMIN — FENTANYL CITRATE 25 MCG: 50 INJECTION, SOLUTION INTRAMUSCULAR; INTRAVENOUS at 17:24

## 2024-03-06 RX ADMIN — ONDANSETRON 4 MG: 2 INJECTION INTRAMUSCULAR; INTRAVENOUS at 17:24

## 2024-03-06 RX ADMIN — QUETIAPINE FUMARATE 50 MG: 25 TABLET ORAL at 22:04

## 2024-03-06 RX ADMIN — HYDROMORPHONE HYDROCHLORIDE 0.25 MG: 1 INJECTION, SOLUTION INTRAMUSCULAR; INTRAVENOUS; SUBCUTANEOUS at 18:53

## 2024-03-06 RX ADMIN — HALOPERIDOL LACTATE 1 MG: 5 INJECTION, SOLUTION INTRAMUSCULAR at 12:51

## 2024-03-06 RX ADMIN — OXYCODONE 5 MG: 5 TABLET ORAL at 22:04

## 2024-03-06 RX ADMIN — Medication 5 MG: at 22:04

## 2024-03-06 RX ADMIN — FENTANYL CITRATE 12.5 MCG: 50 INJECTION, SOLUTION INTRAMUSCULAR; INTRAVENOUS at 18:03

## 2024-03-06 RX ADMIN — FENTANYL CITRATE 12.5 MCG: 50 INJECTION, SOLUTION INTRAMUSCULAR; INTRAVENOUS at 17:50

## 2024-03-06 RX ADMIN — HYDROMORPHONE HYDROCHLORIDE 0.25 MG: 1 INJECTION, SOLUTION INTRAMUSCULAR; INTRAVENOUS; SUBCUTANEOUS at 11:02

## 2024-03-06 RX ADMIN — SODIUM CHLORIDE, POTASSIUM CHLORIDE, SODIUM LACTATE AND CALCIUM CHLORIDE: 600; 310; 30; 20 INJECTION, SOLUTION INTRAVENOUS at 16:43

## 2024-03-06 RX ADMIN — DEXAMETHASONE SODIUM PHOSPHATE 8 MG: 4 INJECTION INTRA-ARTICULAR; INTRALESIONAL; INTRAMUSCULAR; INTRAVENOUS; SOFT TISSUE at 16:51

## 2024-03-06 RX ADMIN — CEFAZOLIN 2 G: 1 INJECTION, POWDER, FOR SOLUTION INTRAMUSCULAR; INTRAVENOUS at 16:49

## 2024-03-06 RX ADMIN — HYDROMORPHONE HYDROCHLORIDE 0.25 MG: 1 INJECTION, SOLUTION INTRAMUSCULAR; INTRAVENOUS; SUBCUTANEOUS at 15:06

## 2024-03-06 RX ADMIN — LIDOCAINE HYDROCHLORIDE 30 MG: 10 INJECTION, SOLUTION EPIDURAL; INFILTRATION; INTRACAUDAL; PERINEURAL at 16:48

## 2024-03-06 RX ADMIN — PROPOFOL 70 MG: 10 INJECTION, EMULSION INTRAVENOUS at 16:48

## 2024-03-06 RX ADMIN — FENTANYL CITRATE 25 MCG: 50 INJECTION, SOLUTION INTRAMUSCULAR; INTRAVENOUS at 16:59

## 2024-03-06 RX ADMIN — MIRTAZAPINE 15 MG: 15 TABLET, FILM COATED ORAL at 22:03

## 2024-03-06 ASSESSMENT — LIFESTYLE VARIABLES
DOES PATIENT WANT TO STOP DRINKING: NO
HAVE PEOPLE ANNOYED YOU BY CRITICIZING YOUR DRINKING: NO
HAVE YOU EVER FELT YOU SHOULD CUT DOWN ON YOUR DRINKING: NO
AVERAGE NUMBER OF DAYS PER WEEK YOU HAVE A DRINK CONTAINING ALCOHOL: 0
EVER FELT BAD OR GUILTY ABOUT YOUR DRINKING: NO
HOW MANY TIMES IN THE PAST YEAR HAVE YOU HAD 5 OR MORE DRINKS IN A DAY: 0
TOTAL SCORE: 0
TOTAL SCORE: 0
REASON UNABLE TO ASSESS: ADVANCED DEMENTIA
ALCOHOL_USE: NO
EVER HAD A DRINK FIRST THING IN THE MORNING TO STEADY YOUR NERVES TO GET RID OF A HANGOVER: NO
TOTAL SCORE: 0
CONSUMPTION TOTAL: NEGATIVE
ON A TYPICAL DAY WHEN YOU DRINK ALCOHOL HOW MANY DRINKS DO YOU HAVE: 0

## 2024-03-06 ASSESSMENT — COGNITIVE AND FUNCTIONAL STATUS - GENERAL
TOILETING: A LOT
SUGGESTED CMS G CODE MODIFIER DAILY ACTIVITY: CL
MOVING FROM LYING ON BACK TO SITTING ON SIDE OF FLAT BED: A LOT
PERSONAL GROOMING: A LOT
SUGGESTED CMS G CODE MODIFIER MOBILITY: CM
HELP NEEDED FOR BATHING: A LOT
TURNING FROM BACK TO SIDE WHILE IN FLAT BAD: A LITTLE
EATING MEALS: A LOT
CLIMB 3 TO 5 STEPS WITH RAILING: TOTAL
WALKING IN HOSPITAL ROOM: TOTAL
STANDING UP FROM CHAIR USING ARMS: TOTAL
DAILY ACTIVITIY SCORE: 12
MOBILITY SCORE: 9
DRESSING REGULAR UPPER BODY CLOTHING: A LOT
DRESSING REGULAR LOWER BODY CLOTHING: A LOT
MOVING TO AND FROM BED TO CHAIR: TOTAL

## 2024-03-06 ASSESSMENT — PAIN DESCRIPTION - PAIN TYPE
TYPE: ACUTE PAIN;SURGICAL PAIN
TYPE: ACUTE PAIN;SURGICAL PAIN
TYPE: ACUTE PAIN

## 2024-03-06 NOTE — DISCHARGE PLANNING
Case Management Discharge Planning    Admission Date: 3/5/2024  GMLOS: 2.8  ALOS: 1    6-Clicks ADL Score: 12  6-Clicks Mobility Score: 9  PT and/or OT Eval ordered: Yes  Post-acute Referrals Ordered: No  Post-acute Choice Obtained: No  Has referral(s) been sent to post-acute provider:  No      Anticipated Discharge Dispo: Discharge Disposition: Discharged to home/self care (01)    DME Needed: No    Action(s) Taken: RNCM placed call to [facility] [phone] who confirmed patient is a resident there. The facility has physical therapy and occupational therapy that will work with the residents.     [facility] address: [address] [place] [id number]    Patient was discussed in IDT rounds. MD has order a CT of the hip.    Escalations Completed: None    Medically Clear: No    Next Steps: pending CT    Barriers to Discharge: Medical clearance    Is the patient up for discharge tomorrow: No

## 2024-03-06 NOTE — CARE PLAN
The patient is Watcher - Medium risk of patient condition declining or worsening    Shift Goals  Clinical Goals: safety  Patient Goals: josefa  Family Goals: updates and safety    Progress made toward(s) clinical / shift goals:  At this time all safety measures in place: bed alarms, pt near nursing station and telesitter in place. Family in room as well for aiding pt with anxiety.     Patient is not progressing towards the following goals:      Problem: Knowledge Deficit - Standard  Goal: Patient and family/care givers will demonstrate understanding of plan of care, disease process/condition, diagnostic tests and medications  Description: Target End Date:  1-3 days or as soon as patient condition allows    Document in Patient Education    1.  Patient and family/caregiver oriented to unit, equipment, visitation policy and means for communicating concern  2.  Complete/review Learning Assessment  3.  Assess knowledge level of disease process/condition, treatment plan, diagnostic tests and medications  4.  Explain disease process/condition, treatment plan, diagnostic tests and medications  Outcome: Not Progressing  Due to advanced dementia josefa knowledge re: condition. Family at bedside and updated on plan of care.

## 2024-03-06 NOTE — PROGRESS NOTES
- yoga low back stretching recommended (images)  - heating pad OK  - ok to use tylenol for break through pain (but NOT advil, ibuprofen or other NSAIDs while taking the Naproxen)  - massage OK   Hospital Medicine Daily Progress Note    Date of Service  3/6/2024    Chief Complaint  MATTHIEU GONZALEZ is a 83 y.o. female admitted 3/5/2024 with left hip fracture    Hospital Course  MATTHIEU GONZALEZ is a 83 y.o. female who presented 3/5/2024 with a past medical history of hypertension, dementia, hypothyroidism, COPD who presents from [place] assisted [facility] for left leg pain.  At baseline she is only alert and oriented x 1.  she had a fall 1 week ago and was evaluated in ER without any significant findings. However she has more difficulties to walk and not able to put weight on.  She went back to ER X-ray of the left hip found nondisplaced fracture involving the greater trochanter of the left proximal femur.      A-fib not on OAC    Interval Problem Update  Patient seen and examined at bedside  Care plan discussed with the son at the bedside  Patient seems more confused than baseline    I discussed with the orthopedics Dr. Zhang, recommended left hip CT, I have ordered  CT hip reviewed and discussed with Dr. Zhang again, plan for hip surgery today    I have discussed this patient's plan of care and discharge plan at IDT rounds today with Case Management, Nursing, Nursing leadership, and other members of the IDT team.    Consultants/Specialty  Orthopedics    Code Status  DNAR/DNI    Disposition  The patient is not medically cleared for discharge to home or a post-acute facility.      I have placed the appropriate orders for post-discharge needs.    Review of Systems  Review of Systems   Unable to perform ROS: Mental status change        Physical Exam  Temp:  [36.5 °C (97.7 °F)-36.7 °C (98.1 °F)] 36.7 °C (98.1 °F)  Pulse:  [62-85] 85  Resp:  [16-18] 18  BP: (109-164)/(75-97) 152/97  SpO2:  [94 %-95 %] 94 %    Physical Exam  Constitutional:       General: She is in acute distress.      Appearance: She is ill-appearing.   HENT:      Head: Normocephalic.      Mouth/Throat:      Mouth: Mucous membranes are moist.    Eyes:      Extraocular Movements: Extraocular movements intact.      Conjunctiva/sclera: Conjunctivae normal.   Cardiovascular:      Rate and Rhythm: Normal rate and regular rhythm.      Pulses: Normal pulses.      Heart sounds: Normal heart sounds.   Pulmonary:      Effort: Pulmonary effort is normal.      Breath sounds: Normal breath sounds.   Abdominal:      General: Bowel sounds are normal.      Palpations: Abdomen is soft.      Tenderness: There is no abdominal tenderness. There is no guarding.   Musculoskeletal:         General: Tenderness present.      Cervical back: Normal range of motion and neck supple.   Skin:     General: Skin is warm.   Neurological:      Mental Status: She is alert. Mental status is at baseline. She is disoriented.         Fluids  No intake or output data in the 24 hours ending 03/06/24 1425    Laboratory  Recent Labs     03/05/24  1930   WBC 10.8   RBC 4.39   HEMOGLOBIN 13.1   HEMATOCRIT 40.0   MCV 91.1   MCH 29.8   MCHC 32.8   RDW 43.4   PLATELETCT 413   MPV 9.3     Recent Labs     03/05/24  1930   SODIUM 141   POTASSIUM 4.0   CHLORIDE 105   CO2 22   GLUCOSE 110*   BUN 11   CREATININE 0.42*   CALCIUM 9.3     Recent Labs     03/05/24  2334   APTT 31.1   INR 1.15*               Imaging  CT-HIP W/O PLUS RECONS LEFT   Final Result      1.  Comminuted fracture of left greater trochanter in near anatomic alignment.           Assessment/Plan  * Displaced intertrochanteric fracture of left femur, initial encounter for closed fracture (HCC)  Assessment & Plan  After a fall that occurred 1 week ago    CT showed Comminuted fracture of left greater trochanter in near anatomic alignment.     I discussed with the orthopedics Dr. Zhang, plan for hip surgery today    PT OT  Pain control        Encounter for preoperative assessment  Assessment & Plan  Admit to:    Orthopedic/Med-Surg floor since no major co-morbidities.     Cardiovascular:   Patient does not have history of CHF  Pre-op EKG: No      Pulmonary:  Oxygen per protocol  Incentive Spirometer    GI:   No history of cirrhosis. Standard bowel regimen. Hold for loose stools.    Renal:   IV fluids: No fluids - risk of fluid overload    Labs: Metabolic Panel every 6 hours for 24 hours for significant electrolyte derangements    Musculoskeletal:   Check 25 OH vitamin D level. If 31-40 pg/mL, consider starting vitamin D3 1000 IU PO daily. If 20-30 pg/mL, consider vitamin D3 2000 IU PO daily. If <20 pg/mL, vitamin D2 50,000 IU weekly x 8 weeks then 2000 IU PO daily.    Neurologic:   Pain Control: Neuro checks every 4 hours  Avoid fentanyl (short-acting)  Acetaminophen 1000 mg PO TID; 650 mg PO TID if liver problems  Delirium or agitation: Age >70: quetiapine 12.5 mg PO x1 PRN agitation (can repeat x1 in 2 hours PRN agitation). Provider assessment completed prior to placing quetiapine orders.     Hematologic:  Plan on pharmacologic DVT prophylaxis post operative day #1. Hold for decreasing hemoglobin. Notify provider for hemoglobin less than 8.  Order preoperative type and cross.   Hgb every 6 hours if high bleeding risk.   If patient was on anticoagulation prior to arrival risks and benefits will be weighed by teams including surgery, hospitalist, geriatrics, and anesthesia for delaying surgery more than 24 hours.   On anticoagulation prior to arrival: No    Medical Assessment Risk:  Intermediate    Surgical Risk:   Intermediate      Chronic atrial fibrillation (HCC)- (present on admission)  Assessment & Plan  Rate controlled  Not on anticoagulation  Continue metoprolol    Alzheimer's dementia without behavioral disturbance (HCC)- (present on admission)  Assessment & Plan  AO x 1 as baseline   continue home medications  Frequent urination  Haldol as needed    Chronic obstructive pulmonary disease (HCC)- (present on admission)  Assessment & Plan  Continue home inhalers    Acquired hypothyroidism- (present on admission)  Assessment & Plan  Continue home  Synthroid    Essential hypertension- (present on admission)  Assessment & Plan  controlled  Continue current home medications  IV as needed medications have been ordered           VTE prophylaxis: VTE Selection    I have performed a physical exam and reviewed and updated ROS and Plan today (3/6/2024). In review of yesterday's note (3/5/2024), there are no changes except as documented above.    I spent greater than 52 minutes for chart review, obtaining history independently, performing medically appropriate examination,  documenting , ordering medications, tests, or procedures, referring and communicating with other health care professionals, Independently interpreting results and communicating results with patient/family/caregiver. More than 50% of time was spent in face-to-face clinical encounter.

## 2024-03-06 NOTE — ASSESSMENT & PLAN NOTE
After a fall that occurred 1 week ago    CT showed Comminuted fracture of left greater trochanter in near anatomic alignment.     S/p surgery with intramedullary device 3/6  PT OT recommended post acute

## 2024-03-06 NOTE — THERAPY
Physical Therapy Contact Note    Patient Name: MATTHIEU GONZALEZ  Age:  83 y.o., Sex:  female  Medical Record #: [id number]  Today's Date: 3/6/2024     PT consult received, pending hip sx today vs GOC; will follow when appropriate for PT eval;     Blanca BLANCHARD, PT,  [phone]

## 2024-03-06 NOTE — DISCHARGE PLANNING
TCN following. HTH/SCP chart review completed.  Secondary to developing POC/GOC, and as discharge plan is highly dependent on  POC/GOC, TCN is holding at this time.  TCN will monitor chart and assist with discharge planning as appropriate.

## 2024-03-06 NOTE — H&P
Hospital Medicine History & Physical Note    Date of Service  3/5/2024    Primary Care Physician  Pcp Pt States None    Consultants  Consult orthopedics in a.m.    Specialist Names:     Code Status  DNAR/DNI    Chief Complaint  Left hip fracture    History of Presenting Illness  MATTHIEU GONZALEZ is a 83 y.o. female who presented 3/5/2024 with with a past medical history of hypertension, dementia, hypothyroidism, COPD who presents from [facility] assisted living for left leg pain.  At baseline she is only alert and oriented x 1.  The history was taken by his son at bedside.  Apparently she had a fall 1 week ago and was evaluated in ER without any significant findings.  She did not started having difficulty walking and unable to bear weight on the left leg.      She was then evaluated at [facility] where she presented with normal vital signs.  X-ray of the left hip found nondisplaced fracture involving the greater trochanter of the left proximal femur.    Tibial and fibula x-rays were negative.  Venous Doppler ultrasounds were negative for DVT.    I discussed the plan of care with patient.    Review of Systems  Review of Systems   Constitutional:  Negative for chills, diaphoresis, fever and malaise/fatigue.   HENT:  Negative for congestion, ear discharge, ear pain, hearing loss, nosebleeds, sinus pain, sore throat and tinnitus.    Eyes:  Negative for blurred vision, double vision, photophobia and pain.   Respiratory:  Negative for cough, hemoptysis, sputum production, shortness of breath, wheezing and stridor.    Cardiovascular:  Negative for chest pain, palpitations, orthopnea, claudication, leg swelling and PND.   Gastrointestinal:  Negative for abdominal pain, blood in stool, constipation, diarrhea, heartburn, melena, nausea and vomiting.   Genitourinary:  Negative for dysuria, flank pain, frequency, hematuria and urgency.   Musculoskeletal:  Positive for falls, joint pain and myalgias. Negative for  back pain and neck pain.   Skin:  Negative for itching and rash.   Neurological:  Negative for dizziness, tingling, tremors, weakness and headaches.   Endo/Heme/Allergies:  Negative for environmental allergies and polydipsia. Does not bruise/bleed easily.   Psychiatric/Behavioral:  Negative for depression, hallucinations, substance abuse and suicidal ideas.        Past Medical History   has a past medical history of Arthritis, Asthma, Balance problem (2/11/2021), Breath shortness, COPD (chronic obstructive pulmonary disease) ([facility]), Dental disorder, Disorder of thyroid, Emphysema of lung ([facility]), High cholesterol, Hypertension, and Pain.    Surgical History   has a past surgical history that includes gyn surgery (1964); hysterectomy, total abdominal (1964); and temporal artery biopsy (Left, 12/19/2018).     Family History  family history includes Heart Disease in her brother; No Known Problems in her father; Stroke in her mother.   Family history reviewed with patient. There is no family history that is pertinent to the chief complaint.     Social History   reports that she has never smoked. She has never used smokeless tobacco. She reports that she does not drink alcohol and does not use drugs.    Allergies  No Known Allergies    Medications  Prior to Admission Medications   Prescriptions Last Dose Informant Patient Reported? Taking?   Cholecalciferol (VITAMIN D3) 125 MCG (5000 UT) Cap 3/4/2024  No Yes   Sig: Take 1 Capsule by mouth every day.   Fluticasone-Umeclidin-Vilant (TRELEGY ELLIPTA) 100-62.5-25 MCG/INH AEROSOL POWDER, BREATH ACTIVATED inhalation 3/4/2024  No Yes   Sig: Inhale 1 Inhalation every day.   acetaminophen (TYLENOL) 500 MG Tab 3/4/2024 MAR from Other Facility Yes Yes   Sig: Take 1,000 mg by mouth every four hours as needed for Moderate Pain. Indications: Pain   acetaminophen (TYLENOL) 500 MG Tab 3/4/2024 MAR from Other Facility Yes Yes   Sig: Take 500-1,000 mg by mouth every 8 hours as needed.  Indications: Pain   albuterol (PROVENTIL) 2.5mg/3ml Nebu Soln solution for nebulization 3/4/2024  No Yes   Sig: Take 3 mL by nebulization every four hours as needed for Shortness of Breath (audible wheezing or rattling).   albuterol 108 (90 Base) MCG/ACT Aero Soln inhalation aerosol 3/4/2024  No Yes   Sig: Inhale 2 Puffs by mouth every four hours as needed for Shortness of Breath.   amLODIPine (NORVASC) 10 MG Tab 3/4/2024  No Yes   Sig: Take 1 Tablet by mouth every day.   citalopram (CELEXA) 20 MG Tab 3/4/2024  No Yes   Sig: TAKE 1 TABLET BY MOUTH EVERY DAY   folic acid (FOLVITE) 1 MG Tab 3/4/2024  No Yes   Sig: TAKE 1 TAB BY MOUTH DAILY FOR SUPPLEMENT   hydrOXYzine HCl (ATARAX) 25 MG Tab 3/4/2024  No Yes   Sig: Take 1 Tablet by mouth 2 times a day.   levothyroxine (SYNTHROID) 125 MCG Tab 3/4/2024  No Yes   Sig: Take 1 Tablet by mouth every morning on an empty stomach.   lidocaine (LIDODERM) 5 % Patch 3/4/2024  No Yes   Sig: Place 1 Patch on the skin every 24 hours. Put one patch on RT side for rib pain @0700, remove patch @1900.   memantine (NAMENDA) 10 MG Tab 3/4/2024  No Yes   Sig: Take 1 Tablet by mouth 2 times a day. At 8 and 1700   metoprolol SR (TOPROL XL) 100 MG TABLET SR 24 HR 3/4/2024  No Yes   Sig: Take 1 Tablet by mouth every day.   mirtazapine (REMERON) 15 MG Tab 3/4/2024  No Yes   Sig: TAKE 1 TAB BY MOUTH AT NIGHT FOR DEPRESSION AND SLEEP   omeprazole (PRILOSEC) 40 MG delayed-release capsule 3/4/2024  No Yes   Sig: TAKE 1 CAPSULE BY MOUTH EVERY DAY   potassium chloride ER (KLOR-CON) 10 MEQ tablet 3/4/2024  No Yes   Sig: Take 1 Tablet by mouth every day.   pravastatin (PRAVACHOL) 40 MG tablet 3/4/2024  No Yes   Sig: TAKE 1 TABLET BY MOUTH EVERY DAY   triamcinolone acetonide (KENALOG) 0.1 % Cream 3/4/2024 MAR from Other Facility Yes Yes   Sig: Apply  topically 2 times a day. Apply thin layer twice daily.      Facility-Administered Medications: None       Physical Exam  Temp:  [36.5 °C (97.7 °F)] 36.5 °C  (97.7 °F)  Pulse:  [62] 62  Resp:  [16] 16  BP: (109)/(75) 109/75  SpO2:  [95 %] 95 %  Blood Pressure : 109/75   Temperature: 36.5 °C (97.7 °F)   Pulse: 62   Respiration: 16   Pulse Oximetry: 95 %       Physical Exam  Vitals and nursing note reviewed.   Constitutional:       General: She is not in acute distress.     Appearance: Normal appearance. She is not ill-appearing, toxic-appearing or diaphoretic.   HENT:      Head: Normocephalic and atraumatic.      Nose: No congestion or rhinorrhea.      Mouth/Throat:      Pharynx: No oropharyngeal exudate or posterior oropharyngeal erythema.   Eyes:      General: No scleral icterus.  Neck:      Vascular: No carotid bruit or JVD.   Cardiovascular:      Rate and Rhythm: Normal rate and regular rhythm.      Pulses: Normal pulses.      Heart sounds: Normal heart sounds. No murmur heard.     No friction rub. No gallop.   Pulmonary:      Effort: Pulmonary effort is normal. No respiratory distress.      Breath sounds: No stridor. No wheezing, rhonchi or rales.   Abdominal:      General: Abdomen is flat. There is no distension.      Palpations: There is no mass.      Tenderness: There is no abdominal tenderness. There is no left CVA tenderness, guarding or rebound.      Hernia: No hernia is present.   Musculoskeletal:         General: No swelling. Normal range of motion.      Cervical back: No rigidity. No muscular tenderness.      Right lower leg: Edema present.      Left lower leg: Edema present.   Lymphadenopathy:      Cervical: No cervical adenopathy.   Skin:     General: Skin is warm and dry.      Capillary Refill: Capillary refill takes 2 to 3 seconds.      Coloration: Skin is not jaundiced or pale.      Findings: No bruising or erythema.   Neurological:      Mental Status: She is alert.         Laboratory:  Recent Labs     03/05/24  1930   WBC 10.8   RBC 4.39   HEMOGLOBIN 13.1   HEMATOCRIT 40.0   MCV 91.1   MCH 29.8   MCHC 32.8   RDW 43.4   PLATELETCT 413   MPV 9.3  "    Recent Labs     03/05/24 1930   SODIUM 141   POTASSIUM 4.0   CHLORIDE 105   CO2 22   GLUCOSE 110*   BUN 11   CREATININE 0.42*   CALCIUM 9.3     Recent Labs     03/05/24 1930   ALTSGPT 8   ASTSGOT 20   ALKPHOSPHAT 189*   TBILIRUBIN 0.6   GLUCOSE 110*         No results for input(s): \"NTPROBNP\" in the last 72 hours.      No results for input(s): \"TROPONINT\" in the last 72 hours.    Imaging:  No orders to display       no X-Ray or EKG requiring interpretation    Assessment/Plan:  Justification for Admission Status  I anticipate this patient will require at least two midnights for appropriate medical management, necessitating inpatient admission because left femur fracture    Patient will need a Med/Surg bed on ORTHOPEDICS service .  The need is secondary to left femur fracture.    * Displaced intertrochanteric fracture of left femur, initial encounter for closed fracture (HCC)  Assessment & Plan  After a fall that occurred 1 week ago  Pain control with oral and IV narcotics  DVT prophylaxis after surgery  PT OT eval  N.p.o. at midnight  Consult orthopedics in a.m.    Encounter for preoperative assessment  Assessment & Plan  Admit to:    Orthopedic/Med-Surg floor since no major co-morbidities.     Cardiovascular:   Patient does not have history of CHF  Pre-op EKG: No     Pulmonary:  Oxygen per protocol  Incentive Spirometer    GI:   No history of cirrhosis. Standard bowel regimen. Hold for loose stools.    Renal:   IV fluids: No fluids - risk of fluid overload    Labs: Metabolic Panel every 6 hours for 24 hours for significant electrolyte derangements    Musculoskeletal:   Check 25 OH vitamin D level. If 31-40 pg/mL, consider starting vitamin D3 1000 IU PO daily. If 20-30 pg/mL, consider vitamin D3 2000 IU PO daily. If <20 pg/mL, vitamin D2 50,000 IU weekly x 8 weeks then 2000 IU PO daily.    Neurologic:   Pain Control: Neuro checks every 4 hours  Avoid fentanyl (short-acting)  Acetaminophen 1000 mg PO TID; 650 mg " PO TID if liver problems  Delirium or agitation: Age >70: quetiapine 12.5 mg PO x1 PRN agitation (can repeat x1 in 2 hours PRN agitation). Provider assessment completed prior to placing quetiapine orders.     Hematologic:  Plan on pharmacologic DVT prophylaxis post operative day #1. Hold for decreasing hemoglobin. Notify provider for hemoglobin less than 8.  Order preoperative type and cross.   Hgb every 6 hours if high bleeding risk.   If patient was on anticoagulation prior to arrival risks and benefits will be weighed by teams including surgery, hospitalist, geriatrics, and anesthesia for delaying surgery more than 24 hours.   On anticoagulation prior to arrival: No    Medical Assessment Risk:  Intermediate    Surgical Risk:   Intermediate      Pathological fracture due to age-related osteoporosis, initial encounter- (present on admission)  Assessment & Plan  Patient presents with a left femur fracture    Chronic atrial fibrillation (HCC)- (present on admission)  Assessment & Plan  Rate controlled  Not on anticoagulation  Continue metoprolol    Alzheimer's dementia without behavioral disturbance (HCC)- (present on admission)  Assessment & Plan  Continue home medications  Frequent urination  Haldol as needed    Chronic obstructive pulmonary disease (HCC)- (present on admission)  Assessment & Plan  Continue home inhalers    Acquired hypothyroidism- (present on admission)  Assessment & Plan  Continue home Synthroid    Essential hypertension- (present on admission)  Assessment & Plan  controlled  Continue current home medications  IV as needed medications have been ordered          VTE prophylaxis: SCDs/TEDs            I discussed advance care planning for at least 20 minutes with the family including diagnosis, prognosis, plan of care, risks and benefits of any therapies that could be offered, as well as alternatives including palliation and hospice, as appropriate. The patient has opted DNR. Time spent is exclusive  of evaluation and management or other separately billable procedures.:

## 2024-03-06 NOTE — ASSESSMENT & PLAN NOTE
Admit to:    Orthopedic/Med-Surg floor since no major co-morbidities.     Cardiovascular:   Patient does not have history of CHF  Pre-op EKG: No     Pulmonary:  Oxygen per protocol  Incentive Spirometer    GI:   No history of cirrhosis. Standard bowel regimen. Hold for loose stools.    Renal:   IV fluids: No fluids - risk of fluid overload    Labs: Metabolic Panel every 6 hours for 24 hours for significant electrolyte derangements    Musculoskeletal:   Check 25 OH vitamin D level. If 31-40 pg/mL, consider starting vitamin D3 1000 IU PO daily. If 20-30 pg/mL, consider vitamin D3 2000 IU PO daily. If <20 pg/mL, vitamin D2 50,000 IU weekly x 8 weeks then 2000 IU PO daily.    Neurologic:   Pain Control: Neuro checks every 4 hours  Avoid fentanyl (short-acting)  Acetaminophen 1000 mg PO TID; 650 mg PO TID if liver problems  Delirium or agitation: Age >70: quetiapine 12.5 mg PO x1 PRN agitation (can repeat x1 in 2 hours PRN agitation). Provider assessment completed prior to placing quetiapine orders.     Hematologic:  Plan on pharmacologic DVT prophylaxis post operative day #1. Hold for decreasing hemoglobin. Notify provider for hemoglobin less than 8.  Order preoperative type and cross.   Hgb every 6 hours if high bleeding risk.   If patient was on anticoagulation prior to arrival risks and benefits will be weighed by teams including surgery, hospitalist, geriatrics, and anesthesia for delaying surgery more than 24 hours.   On anticoagulation prior to arrival: No    Medical Assessment Risk:  Intermediate    Surgical Risk:   Intermediate

## 2024-03-06 NOTE — PROGRESS NOTES
4 Eyes Skin Assessment Completed by Aisha RN and DEMETRIS Feng.    Head WDL  Ears WDL  Nose WDL  Mouth WDL  Neck WDL  Breast/Chest WDL  Shoulder Blades WDL  Spine WDL  (R) Arm/Elbow/Hand bruising  (L) Arm/Elbow/Hand Bruising  Abdomen WDL  Groin WDL  Scrotum/Coccyx/Buttocks Redness  (R) Leg Scab  (L) Leg Redness  (R) Heel/Foot/Toe WDL  (L) Heel/Foot/Toe WDL          Devices In Places Pulse Ox and SCD's      Interventions In Place Pillows    Possible Skin Injury No    Pictures Uploaded Into Epic N/A  Wound Consult Placed N/A  RN Wound Prevention Protocol Ordered No

## 2024-03-06 NOTE — CARE PLAN
The patient is Stable - Low risk of patient condition declining or worsening    Shift Goals  Clinical Goals: CT scan, ortho consult  Patient Goals: BETZAIDA  Family Goals: Pain control, safety    Progress made toward(s) clinical / shift goals:        Problem: Knowledge Deficit - Standard  Goal: Patient and family/care givers will demonstrate understanding of plan of care, disease process/condition, diagnostic tests and medications  Outcome: Progressing  Note: Discussed POC with family at bedside, plan for CT scan to determine surgery plan, family understands and agrees.      Problem: Pain - Standard  Goal: Alleviation of pain or a reduction in pain to the patient’s comfort goal  Outcome: Progressing  Note: Non-verbal descriptors used to assess pt's pain, medicated per MAR.

## 2024-03-07 PROBLEM — E83.39 HYPERPHOSPHATEMIA: Status: ACTIVE | Noted: 2024-03-07

## 2024-03-07 LAB
ANION GAP SERPL CALC-SCNC: 13 MMOL/L (ref 7–16)
BUN SERPL-MCNC: 22 MG/DL (ref 8–22)
CALCIUM SERPL-MCNC: 9.3 MG/DL (ref 8.5–10.5)
CHLORIDE SERPL-SCNC: 103 MMOL/L (ref 96–112)
CO2 SERPL-SCNC: 22 MMOL/L (ref 20–33)
CREAT SERPL-MCNC: 0.86 MG/DL (ref 0.5–1.4)
ERYTHROCYTE [DISTWIDTH] IN BLOOD BY AUTOMATED COUNT: 43.5 FL (ref 35.9–50)
GFR SERPLBLD CREATININE-BSD FMLA CKD-EPI: 66 ML/MIN/1.73 M 2
GLUCOSE SERPL-MCNC: 155 MG/DL (ref 65–99)
HCT VFR BLD AUTO: 41.1 % (ref 37–47)
HGB BLD-MCNC: 13.3 G/DL (ref 12–16)
MAGNESIUM SERPL-MCNC: 2.1 MG/DL (ref 1.5–2.5)
MCH RBC QN AUTO: 29.7 PG (ref 27–33)
MCHC RBC AUTO-ENTMCNC: 32.4 G/DL (ref 32.2–35.5)
MCV RBC AUTO: 91.7 FL (ref 81.4–97.8)
PHOSPHATE SERPL-MCNC: 5.4 MG/DL (ref 2.5–4.5)
PLATELET # BLD AUTO: 307 K/UL (ref 164–446)
PMV BLD AUTO: 10.1 FL (ref 9–12.9)
POTASSIUM SERPL-SCNC: 5.3 MMOL/L (ref 3.6–5.5)
RBC # BLD AUTO: 4.48 M/UL (ref 4.2–5.4)
SODIUM SERPL-SCNC: 138 MMOL/L (ref 135–145)
WBC # BLD AUTO: 8.5 K/UL (ref 4.8–10.8)

## 2024-03-07 PROCEDURE — 97162 PT EVAL MOD COMPLEX 30 MIN: CPT

## 2024-03-07 PROCEDURE — 700102 HCHG RX REV CODE 250 W/ 637 OVERRIDE(OP): Performed by: STUDENT IN AN ORGANIZED HEALTH CARE EDUCATION/TRAINING PROGRAM

## 2024-03-07 PROCEDURE — 770004 HCHG ROOM/CARE - ONCOLOGY PRIVATE *

## 2024-03-07 PROCEDURE — 85027 COMPLETE CBC AUTOMATED: CPT

## 2024-03-07 PROCEDURE — 97167 OT EVAL HIGH COMPLEX 60 MIN: CPT

## 2024-03-07 PROCEDURE — 700102 HCHG RX REV CODE 250 W/ 637 OVERRIDE(OP): Performed by: HOSPITALIST

## 2024-03-07 PROCEDURE — 97535 SELF CARE MNGMENT TRAINING: CPT

## 2024-03-07 PROCEDURE — A9270 NON-COVERED ITEM OR SERVICE: HCPCS | Performed by: STUDENT IN AN ORGANIZED HEALTH CARE EDUCATION/TRAINING PROGRAM

## 2024-03-07 PROCEDURE — 84100 ASSAY OF PHOSPHORUS: CPT

## 2024-03-07 PROCEDURE — 99232 SBSQ HOSP IP/OBS MODERATE 35: CPT | Performed by: STUDENT IN AN ORGANIZED HEALTH CARE EDUCATION/TRAINING PROGRAM

## 2024-03-07 PROCEDURE — 83735 ASSAY OF MAGNESIUM: CPT

## 2024-03-07 PROCEDURE — 36415 COLL VENOUS BLD VENIPUNCTURE: CPT

## 2024-03-07 PROCEDURE — 700111 HCHG RX REV CODE 636 W/ 250 OVERRIDE (IP): Performed by: HOSPITALIST

## 2024-03-07 PROCEDURE — 80048 BASIC METABOLIC PNL TOTAL CA: CPT

## 2024-03-07 PROCEDURE — A9270 NON-COVERED ITEM OR SERVICE: HCPCS | Performed by: HOSPITALIST

## 2024-03-07 PROCEDURE — 700111 HCHG RX REV CODE 636 W/ 250 OVERRIDE (IP): Mod: JZ | Performed by: ORTHOPAEDIC SURGERY

## 2024-03-07 RX ADMIN — OXYCODONE 5 MG: 5 TABLET ORAL at 03:46

## 2024-03-07 RX ADMIN — OXYCODONE 2.5 MG: 5 TABLET ORAL at 13:06

## 2024-03-07 RX ADMIN — QUETIAPINE FUMARATE 50 MG: 25 TABLET ORAL at 20:00

## 2024-03-07 RX ADMIN — OXYCODONE 5 MG: 5 TABLET ORAL at 22:07

## 2024-03-07 RX ADMIN — MEMANTINE HYDROCHLORIDE 10 MG: 10 TABLET ORAL at 17:01

## 2024-03-07 RX ADMIN — OMEPRAZOLE 40 MG: 20 CAPSULE, DELAYED RELEASE ORAL at 06:09

## 2024-03-07 RX ADMIN — LEVOTHYROXINE SODIUM 125 MCG: 0.12 TABLET ORAL at 06:08

## 2024-03-07 RX ADMIN — METOPROLOL SUCCINATE 100 MG: 50 TABLET, EXTENDED RELEASE ORAL at 06:08

## 2024-03-07 RX ADMIN — DOCUSATE SODIUM 50 MG AND SENNOSIDES 8.6 MG 2 TABLET: 8.6; 5 TABLET, FILM COATED ORAL at 17:01

## 2024-03-07 RX ADMIN — MEMANTINE HYDROCHLORIDE 10 MG: 10 TABLET ORAL at 06:08

## 2024-03-07 RX ADMIN — POLYETHYLENE GLYCOL 3350 1 PACKET: 17 POWDER, FOR SOLUTION ORAL at 06:07

## 2024-03-07 RX ADMIN — HYDROMORPHONE HYDROCHLORIDE 0.25 MG: 1 INJECTION, SOLUTION INTRAMUSCULAR; INTRAVENOUS; SUBCUTANEOUS at 23:58

## 2024-03-07 RX ADMIN — ACETAMINOPHEN 1000 MG: 500 TABLET ORAL at 06:09

## 2024-03-07 RX ADMIN — OXYCODONE 2.5 MG: 5 TABLET ORAL at 08:52

## 2024-03-07 RX ADMIN — HALOPERIDOL LACTATE 1 MG: 5 INJECTION, SOLUTION INTRAMUSCULAR at 03:46

## 2024-03-07 RX ADMIN — AMLODIPINE BESYLATE 10 MG: 10 TABLET ORAL at 06:09

## 2024-03-07 RX ADMIN — HALOPERIDOL LACTATE 1 MG: 5 INJECTION, SOLUTION INTRAMUSCULAR at 20:00

## 2024-03-07 RX ADMIN — CITALOPRAM HYDROBROMIDE 20 MG: 20 TABLET ORAL at 06:08

## 2024-03-07 RX ADMIN — ACETAMINOPHEN 1000 MG: 500 TABLET ORAL at 13:06

## 2024-03-07 RX ADMIN — MIRTAZAPINE 15 MG: 15 TABLET, FILM COATED ORAL at 20:00

## 2024-03-07 RX ADMIN — ENOXAPARIN SODIUM 40 MG: 100 INJECTION SUBCUTANEOUS at 06:06

## 2024-03-07 RX ADMIN — Medication 5 MG: at 20:00

## 2024-03-07 ASSESSMENT — COGNITIVE AND FUNCTIONAL STATUS - GENERAL
MOVING TO AND FROM BED TO CHAIR: A LOT
WALKING IN HOSPITAL ROOM: TOTAL
DRESSING REGULAR UPPER BODY CLOTHING: A LOT
TOILETING: A LOT
MOVING FROM LYING ON BACK TO SITTING ON SIDE OF FLAT BED: TOTAL
TURNING FROM BACK TO SIDE WHILE IN FLAT BAD: TOTAL
SUGGESTED CMS G CODE MODIFIER MOBILITY: CM
MOBILITY SCORE: 8
DAILY ACTIVITIY SCORE: 14
HELP NEEDED FOR BATHING: A LOT
PERSONAL GROOMING: A LITTLE
STANDING UP FROM CHAIR USING ARMS: A LOT
CLIMB 3 TO 5 STEPS WITH RAILING: TOTAL
EATING MEALS: A LITTLE
SUGGESTED CMS G CODE MODIFIER DAILY ACTIVITY: CK
DRESSING REGULAR LOWER BODY CLOTHING: A LOT

## 2024-03-07 ASSESSMENT — PAIN DESCRIPTION - PAIN TYPE
TYPE: SURGICAL PAIN
TYPE: ACUTE PAIN;SURGICAL PAIN
TYPE: SURGICAL PAIN

## 2024-03-07 ASSESSMENT — GAIT ASSESSMENTS: GAIT LEVEL OF ASSIST: UNABLE TO PARTICIPATE

## 2024-03-07 ASSESSMENT — ACTIVITIES OF DAILY LIVING (ADL): TOILETING: INDEPENDENT

## 2024-03-07 NOTE — DISCHARGE PLANNING
HTH/SCP TCN chart review completed. Collaborated with CABRERA Wyatt prior to meeting with the pt. The most current review of medical record, knowledge of pt's PLOF and social support, LACE+ score of 49, 6 clicks scores of 8 mobility were considered.      Note pt is oriented x1 per review and TCN placed call to son to discuss dc planning. Introduced TCN program. Provided education regarding post acute levels of care. Education provided regarding case management policy for blanket SNF referrals. Discussed HTH/SCP plan benefits. Son verbalizes understanding.    Note that pt lives in IVY/memory care facility in [place]. Per discussion with son, pt would need to require minimal physical assistance with transfers, ambulation, etc, in order to return to this facility. Per discussion, anticipating that pt may require post acute placement prior to return to pt's current living environment. Discussed CM blanket referral policy if post acute placement recommended by therapy/team with son in agreement and he reports he will assist with choice from accepting facilities in [facility] if indicated. Son also in agreement with HH or IRF if these services are recommended as well.      No additional provider requests and note pt has PT and OT consults in place and TCN will monitor for recs. Choice proactively obtained for IRF and HH, faxed to ILEANA and given to CABRERA. CABRERA also aware of aforementioned and will place blanket referral to SNFs if recommended*. TCN will continue to follow and collaborate with discharge planning team as additional post acute needs arise. Thank you.     Completed today:  PT with recommendations for post acute placement on 3/7  OT consult in place; will monitor for recs  Choice obtained: IRF, HH; son aware of CM blanket referral policy and reports will select from accepting facilities if indicated for dc  *noted in PM, blanket referrals sent and pt has several accepting SNF facilities as well

## 2024-03-07 NOTE — THERAPY
Physical Therapy   Initial Evaluation     Patient Name: MATTHIEU GONZALEZ  Age:  83 y.o., Sex:  female  Medical Record #: [id number]  Today's Date: 3/7/2024     Precautions  Precautions: Fall Risk;Weight Bearing As Tolerated Left Lower Extremity  Comments: hx dementia    Assessment  Patient is an 83 y.o. female with hx of HTN, dementia, COPD, A fib, and hypothyroidism, now admitted from memory care s/p F with L greater trochanteric femur fx. Pt now s/p IMN on 3/6. PT eval complete, pt currently presents below her functional baseline due to pain and impaired strength, balance, coordination, cognition, activity tolerance, and mobility. Pt appears to be near her baseline cognition per the son, pt not aware that she had surgery and having to be reoriented multiple times. Pt is generally at Max-Total A with consistent LLE deficits, not able to stand fully or ambulate at this time. The pt's son contacted the pt's facility who stated that the pt needs to be able to move OOB on her own; pt will benefit from post acute placement at this time to maximize functional independence and decrease caregiver burden. Will follow while admitted for skilled PT intervention.     Plan    Physical Therapy Initial Treatment Plan   Treatment Plan : Bed Mobility, Gait Training, Neuro Re-Education / Balance, Self Care / Home Evaluation, Stair Training, Therapeutic Activities, Therapeutic Exercise  Treatment Frequency: 3 Times per Week  Duration: Until Therapy Goals Met    DC Equipment Recommendations: Unable to determine at this time  Discharge Recommendations: Recommend post-acute placement for additional physical therapy services prior to discharge home (pt needs to demonstrate improved mobility prior to return to facility)         Vitals   Pulse Oximetry 97 %   O2 (LPM) 1   O2 Delivery Device Oxymask   Pain 0 - 10 Group   Location Hip   Location Orientation Left   Therapist Pain Assessment During Activity   Non Verbal Descriptors   Non Verbal  Scale  Calm   Prior Living Situation   Prior Services Continuous (24 Hour) Care Giving Per Service   Housing / Facility Assisted Living Residence  (memory care)   Equipment Owned Front-Wheel Walker;Wheelchair   Lives with - Patient's Self Care Capacity Attendant / Paid Care Giver   Comments pt has support as needed. pt's son called the facility and they stated that the pt needs to be able to get in/out of bed on her own to return   Prior Level of Functional Mobility   Bed Mobility Independent   Transfer Status Independent   Ambulation Independent   Ambulation Distance limited household distances   Assistive Devices Used Front-Wheel Walker   History of Falls   History of Falls Yes   Date of Last Fall   (reason for admit)   Cognition    Cognition / Consciousness X   Speech/ Communication Delayed Responses   Orientation Level Not Oriented to Reason   Level of Consciousness Responds to voice   Safety Awareness Impaired   New Learning Impaired   Attention Impaired   Sequencing Impaired   Initiation Impaired   Active ROM Lower Body    Active ROM Lower Body  X   Comments limited L hip/knee ROM due to pain   Strength Lower Body   Lower Body Strength  X   Comments LLE proximal strength limited by pain   Coordination Lower Body    Comments LLE grossly impaired by pain   Balance Assessment   Sitting Balance (Static) Fair -   Sitting Balance (Dynamic) Poor +   Standing Balance (Static) Poor -   Standing Balance (Dynamic) Trace +   Weight Shift Sitting Poor   Weight Shift Standing Poor   Comments FWW in standing, consistent retropulsion with poor use of FWW for balance   Bed Mobility    Supine to Sit Total Assist   Sit to Supine Total Assist   Scooting Minimal Assist  (EOB)   Gait Analysis   Gait Level Of Assist Unable to Participate   Functional Mobility   Sit to Stand Maximal Assist   Mobility STS x1 with 2 person assist   Comments pt unable to stand up fully due to pain and poor command following   6 Clicks Assessment - How  much HELP from from another person do you currently need... (If the patient hasn't done an activity recently, how much help from another person do you think he/she would need if he/she tried?)   Turning from your back to your side while in a flat bed without using bedrails? 1   Moving from lying on your back to sitting on the side of a flat bed without using bedrails? 1   Moving to and from a bed to a chair (including a wheelchair)? 2   Standing up from a chair using your arms (e.g., wheelchair, or bedside chair)? 2   Walking in hospital room? 1   Climbing 3-5 steps with a railing? 1   6 clicks Mobility Score 8   Activity Tolerance   Sitting Edge of Bed 8 min   Standing <1 min   Comments limited by cognition and pain   Short Term Goals    Short Term Goal # 1 pt will move supine<>eob with spv in 6 tx for bed mobility.   Short Term Goal # 2 pt will complete spt with fww and cga in 6 tx for functional mobility.   Short Term Goal # 3 pt will ambulate 100 ft with fww and cga in 6 tx for limited household distances.   Education Group   Education Provided Role of Physical Therapist   Role of Physical Therapist Patient Response Patient;Family;Acceptance;Explanation;Verbal Demonstration   Additional Comments further education on WB precautions, importance of OOB mobility vs cons of prolonged bed rest, pt's CLOF and rehab necessity   Physical Therapy Initial Treatment Plan    Treatment Plan  Bed Mobility;Gait Training;Neuro Re-Education / Balance;Self Care / Home Evaluation;Stair Training;Therapeutic Activities;Therapeutic Exercise   Treatment Frequency 3 Times per Week   Duration Until Therapy Goals Met   Problem List    Problems Pain;Impaired Transfers;Impaired Bed Mobility;Impaired Ambulation;Functional ROM Deficit;Functional Strength Deficit;Impaired Balance;Impaired Coordination;Decreased Activity Tolerance;Safety Awareness Deficits / Cognition   Anticipated Discharge Equipment and Recommendations   DC Equipment  Recommendations Unable to determine at this time   Discharge Recommendations Recommend post-acute placement for additional physical therapy services prior to discharge home  (pt needs to demonstrate improved mobility prior to return to facility)   Interdisciplinary Plan of Care Collaboration   IDT Collaboration with  Family / Caregiver;Nursing;Occupational Therapist   Patient Position at End of Therapy In Bed;Bed Alarm On;Call Light within Reach;Tray Table within Reach;Phone within Reach;Family / Friend in Room   Collaboration Comments RN updated; co-eval with OT due to pt cognition and medicaly complexity, increased pain and poor tolerance/endurance for multiple instances moving OOB   Session Information   Date / Session Number  3/7- 1 (1/3, 3/13)

## 2024-03-07 NOTE — CARE PLAN
Problem: Pain - Standard  Goal: Alleviation of pain or a reduction in pain to the patient’s comfort goal  Outcome: Progressing     Problem: Fall Risk  Goal: Patient will remain free from falls  Outcome: Progressing     The patient is Watcher - Medium risk of patient condition declining or worsening    Shift Goals  Clinical Goals: safety  Patient Goals: josefa  Family Goals: pain management and safety    Progress made toward(s) clinical / shift goals:  Fall precautions are in place    Patient is not progressing towards the following goals:

## 2024-03-07 NOTE — ANESTHESIA TIME REPORT
Anesthesia Start and Stop Event Times       Date Time Event    3/6/2024 1621 Ready for Procedure     1645 Anesthesia Start     1737 Anesthesia Stop          Responsible Staff  03/06/24      Name Role Begin End    Alicia Shepard M.D. Anesth 164 1734          Overtime Reason:  no overtime (within assigned shift)    Comments:

## 2024-03-07 NOTE — ANESTHESIA PROCEDURE NOTES
Airway    Date/Time: 3/6/2024 4:49 PM    Performed by: Alicia Shepard M.D.  Authorized by: Alicia Shepard M.D.    Location:  OR  Urgency:  Elective  Indications for Airway Management:  Anesthesia      Spontaneous Ventilation: absent    Sedation Level:  Deep  Preoxygenated: Yes    Mask Difficulty Assessment:  0 - not attempted  Final Airway Type:  Supraglottic airway  Final Supraglottic Airway:  Standard LMA    SGA Size:  3  Airway Seal Pressure (cm H2O):  21  Number of Attempts at Approach:  1

## 2024-03-07 NOTE — OP REPORT
DATE OF OPERATION: 3/6/2024     PREOPERATIVE DIAGNOSIS: left greater trochanteric femur fracture with intertrochanteric extension    POSTOPERATIVE DIAGNOSIS: Same    PROCEDURE PERFORMED: Surgical treatment of left intertrochanteric femur fracture with intramedullary device    SURGEON: Keven Zhang M.D.     ASSISTANT: none    ANESTHESIA: General    SPECIMEN: None    ESTIMATED BLOOD LOSS: 25 mL      INDICATIONS: The patient is a 83 y.o. female with a left intertrochanteric femur fracture resulting from a ground level fall.  The patient denies antecedent pain, and was found to have a normal neurovascular exam and intact skin envelope.  Radiographs demonstrated the intertrochanteric femur fracture.  Given these findings, the patient is an appropriately indicated candidate for surgical treatment of the intertrochanteric fracture with an intramedullary device.  I discussed the risks and benefits of the procedure, including the risks of infection, wound healing complication, neurovascular injury, persistent hip pain, malunion, non-union, malrotation, and the medical risks of anesthesia including MI, stroke, and death.  Benefits include early mobilization, improved chance of union, and reduction in the medical risks of hip fractures.  Alternatives to surgery were also discussed, including non-operative management, which I did not recommend.  The patient was in agreement with the plan to proceed, and the informed consent was signed and documented.  I met with the patient pre-operatively and marked the operative extremity with their agreement.  We proceeded to the operating room.     DESCRIPTION OF PROCEDURE:  The patient was seen in the preoperative holding area on the date of surgery.  The operative hip was marked with the surgeon's initials.  The patient was taken to the operating room and placed supine on the operating table.  Anesthesia was induced.  Both feet were secured in the [facility] boot foot holders.  A preliminary  reduction was done under image intensifier imaging.  Traction and manipulation were utilized to obtain an anatomic position.  At this point, the operative hip was prepped and draped in normal sterile fashion.  Operative pause was conducted to the correct patient, side, site, and procedure were identified as well as surgeon's initial on the operative extremity.  A guide pin was placed percutaneously to the start point on the proximal femur.  This was advanced into the intramedullary space.  Next, a skin incision was made with over this guide pin and an opening drill was used to gain access to the intramedullary space.  A ball-tipped guide todd was placed and the canal was reamed to 11-1/2 mm A short hip nail was placed through this opening and advanced to the correct depth for placement of the cephalomedullary screw.  Using the outrigger guide, we placed a cephalomedullary screw over a guide pin aiming for the center-center of the femoral head.  This was locked in place with proximal set screw.  We then used the outrigger guide to place a bicortical interlocking screw distally.  Once this had been drilled and placed, final fluoroscopic images were obtained and showed anatomic reduction of the left hip.  The wounds were then thoroughly irrigated and closed in layered fashion with 2-0 Vicryl and staples.  Sterile dressings were placed.  The patient was allowed to awaken in the operating room and taken to the PACU in stable condition.     POSTOPERATIVE PLAN:  Weight bearing as tolerated.  Mobilize with physical and occupational therapies.  DVT prophylaxis with SCDs and Lovenox until mobilizing independently and then can be switched to aspirin for 4 weeks.  The patient will follow up in clinic in 2 weeks to check wounds and remove staples.      ____________________________________   Keven Zhang M.D.   DD: 3/6/2024  5:33 PM

## 2024-03-07 NOTE — OR NURSING
@1732 PT arrived to PACU. Report received from Anesthesia and OR RN. Orders released and followed.     @1820 Report called to Jennifer WILLSON on CNU.    @1838 Pt transported to [facility]

## 2024-03-07 NOTE — DISCHARGE PLANNING
@8023 DPA faxed a referral to the [place] [facility]'s per RN CM request.    @2881  Agency/Facility Name: [facility]   Spoke To: Emelina   Outcome: Man has a bed available tomorrow requested transport time is 11 o clock.    RN CM notified via teams.    Agency/Facility Name: [facility]  Spoke To: Jessica  Outcome: Man would like to know why the pt is on seroquel.  DPA reached out to RN CM.      Pt is on seroquel for deirium or agitiation.    @9157  Agency/Facility Name: [facility]  Spoke To: Jessica  Outcome: ILEANA called Jessica stating the pt is on seroquel for delirium or agitation.  Referral will be declined.  It medication is discontinued, Man will reconsider.    RN CM notified via Teams.

## 2024-03-07 NOTE — DISCHARGE PLANNING
Case Management Discharge Planning    Admission Date: 3/5/2024  GMLOS: 2.8  ALOS: 2    6-Clicks ADL Score: 12  6-Clicks Mobility Score: 8  PT and/or OT Eval ordered: Yes  Post-acute Referrals Ordered: Yes  Post-acute Choice Obtained: Yes  Has referral(s) been sent to post-acute provider:  Yes      Anticipated Discharge Dispo: Discharge Disposition: Discharged to home/self care (01)    DME Needed: No    Action(s) Taken: Discussed pt in IDT rounds, pt post op sx day 2. Received clarifications from patients son who clarified PT/OT would not be able to be provided at [facility]. PT was able to assess and recommend post acute placement. PASRR went into manual review, will continue to follow up for updates. Requested DPA send out blanket referral to local SNFs per sons request as he lives in the [facility].     14:25: [facility] # [id number]   LOC [phone]   [facility] have accepted pt. Will wait for other facilities to make determination  before notifying patient's son to obtain choice.       Escalations Completed: None    Medically Clear: No    Next Steps: Pending medical clearance    Barriers to Discharge: None    Is the patient up for discharge tomorrow: No

## 2024-03-07 NOTE — ANESTHESIA PREPROCEDURE EVALUATION
Case: [id number] Date/Time: 03/06/24 1636    Procedure: INSERTION, INTRAMEDULLARY NATASHA, FEMUR, PROXIMAL (Left)    Location: [facility] OR 16 / SURGERY [facility]    Surgeons: Keven Zhang M.D.          82yo F with broken hip, likely since first fall on 2/20. Here for hip nail in OR    Very severe dementia and has been combative on the floor, hitting and biting staff.      Relevant Problems   PULMONARY   (positive) Chronic obstructive pulmonary disease (HCC)      CARDIAC   (positive) Atherosclerosis of aorta (HCC)   (positive) CHF (congestive heart failure) (HCC)   (positive) Chronic atrial fibrillation (HCC) (Apparently not on anticoagulation )   (positive) Essential hypertension      ENDO   (positive) Acquired hypothyroidism      Other   (positive) Alzheimer's dementia without behavioral disturbance (HCC)   (positive) Displaced intertrochanteric fracture of left femur, initial encounter for closed fracture (HCC)       Physical Exam    Airway - unable to assess       Cardiovascular - normal exam  Rhythm: regular  Rate: normal  (-) murmur     Dental     Unable to assess dental       Pulmonary - normal exam  Breath sounds clear to auscultation     Abdominal    Neurological - normal exam                   Anesthesia Plan    ASA 3- EMERGENT   ASA physical status 3 criteria: COPD - poorly controlled and moderate reduction of ejection fraction    Plan - general       Airway plan will be LMA    (Son aware she is high risk for anesthesia due to cardiopulmonary risk factors. High risk of postop cognitive dysfunction as well.  DNR suspending in OR but will also try to respect patient wishes and not prolong any rescuscitation)      Induction: intravenous    Postoperative Plan: Postoperative administration of opioids is intended.    Pertinent diagnostic labs and testing reviewed    Informed Consent:    Anesthetic plan and risks discussed with healthcare power of  (Son consented).    Use of blood products discussed with:  healthcare power of  whom consented to blood products.

## 2024-03-07 NOTE — THERAPY
Occupational Therapy   Initial Evaluation     Patient Name: MATTHIEU GONZALEZ  Age:  83 y.o., Sex:  female  Medical Record #: [id number]  Today's Date: 3/7/2024     Precautions  Precautions: Fall Risk, Weight Bearing As Tolerated Left Lower Extremity  Comments: hx dementia    Assessment  Patient is 83 y.o. female with a diagnosis of L greater trochanter fx.  Pt currently limited by decreased functional mobility, activity tolerance, cognition, sensation, strength, AROM, coordination, balance, and pain which are affecting pt's ability to complete ADLs/IADLs at baseline. Pt would benefit from OT services in the acute care setting to maximize functional recovery.  Plan    Occupational Therapy Initial Treatment Plan   Treatment Interventions: (P) Self Care / Activities of Daily Living, Therapeutic Activity  Treatment Frequency: (P) 3 Times per Week  Duration: (P) Until Therapy Goals Met       Discharge Recommendations: (P) Recommend post-acute placement for additional occupational therapy services prior to discharge home        03/07/24 0905   Prior Living Situation   Prior Services Continuous (24 Hour) Care Giving Per Service   Housing / Facility Assisted Living Residence  (memory care)   Equipment Owned Front-Wheel Walker;Wheelchair   Lives with - Patient's Self Care Capacity Attendant / Paid Care Giver   Prior Level of ADL Function   Self Feeding Independent   Grooming / Hygiene Independent   Bathing Requires Assist   Dressing Independent   Toileting Independent   Cognition    Speech/ Communication Delayed Responses   Orientation Level Not Oriented to Day;Not Oriented to Time;Not Oriented to Place;Not Oriented to Reason   Level of Consciousness Responds to voice   Safety Awareness Impaired   New Learning Impaired   Attention Impaired   Sequencing Impaired   Initiation Impaired   ADL Assessment   Grooming Minimal Assist   Upper Body Dressing Moderate Assist   Lower Body Dressing Maximal Assist   Toileting Maximal Assist    Functional Mobility   Sit to Stand Maximal Assist   Bed, Chair, Wheelchair Transfer Unable to Participate   Short Term Goals   Short Term Goal # 1 supervised with UB dressing   Short Term Goal # 2 min A with LB dressing   Short Term Goal # 3 min A with ADL txfs   Occupational Therapy Initial Treatment Plan    Treatment Interventions Self Care / Activities of Daily Living;Therapeutic Activity   Treatment Frequency 3 Times per Week   Duration Until Therapy Goals Met   Anticipated Discharge Equipment and Recommendations   Discharge Recommendations Recommend post-acute placement for additional occupational therapy services prior to discharge home

## 2024-03-07 NOTE — ANESTHESIA POSTPROCEDURE EVALUATION
Patient: MATTHIEU GONZALEZ    Procedure Summary       Date: 03/06/24 Room / Location: [place] [address] / SURGERY [facility]    Anesthesia Start: 1645 Anesthesia Stop: 1737    Procedure: INSERTION, INTRAMEDULLARY NATASHA, FEMUR, PROXIMAL (Left: Hip) Diagnosis: (Left greater trochanter fracture with intertrochanteric extension)    Surgeons: Keven Zhang M.D. Responsible Provider: Alicia Shepard M.D.    Anesthesia Type: general ASA Status: 3 - Emergent            Final Anesthesia Type: general  Last vitals  BP   Blood Pressure : (!) 166/65    Temp   36.3 °C (97.4 °F)    Pulse   85   Resp   16    SpO2   97 %      Anesthesia Post Evaluation    Patient location during evaluation: PACU  Patient participation: complete - patient participated  Level of consciousness: awake and confused    Airway patency: patent  Anesthetic complications: no  Cardiovascular status: hemodynamically stable  Respiratory status: acceptable  Hydration status: euvolemic    PONV: none          No notable events documented.     Nurse Pain Score: 1 (NPRS)

## 2024-03-07 NOTE — CONSULTS
Date of Service: 24    MATTHIEU GONZALEZ was seen today in consultation for left hip fracture at the request of Dr. Downey    CC: Left hip fracture    HPI: MATTHIEU GONZALEZ is a 83 y.o. female who presents with complaints of pain to left hip.  She has a history of significant dementia and is unable to give any history herself.  Her son is available for history.  She lives in a memory care unit.  She fell on the  of last month.  She was admitted to hospital and treated for UTI at the time.  She was unable to mobilize after she tried to return back to her memory care unit.  X-rays done later showed greater trochanter fracture.  She was transferred here for orthopedic care and evaluation.  CT scan was done which showed a possible extension towards the lesser trochanter.    PMH:   Past Medical History:   Diagnosis Date    Arthritis     hands    Asthma     inhalers    Balance problem 2021    Breath shortness     COPD (chronic obstructive pulmonary disease) ([facility])     Dental disorder     partial-doesnt wear    Disorder of thyroid     Emphysema of lung ([facility])     High cholesterol     Hypertension     Pain     feet       PSH:   Past Surgical History:   Procedure Laterality Date    TEMPORAL ARTERY BIOPSY Left 2018    Procedure: TEMPORAL ARTERY BIOPSY;  Surgeon: Orestes Mina M.D.;  Location: SURGERY [facility];  Service: General    GYN SURGERY          HYSTERECTOMY, TOTAL ABDOMINAL  1964       FH:   Family History   Problem Relation Age of Onset    Stroke Mother     No Known Problems Father     Heart Disease Brother        SH:   Social History     Socioeconomic History    Marital status:      Spouse name: Not on file    Number of children: Not on file    Years of education: Not on file    Highest education level: Not on file   Occupational History    Not on file   Tobacco Use    Smoking status: Never    Smokeless tobacco: Never   Vaping Use    Vaping Use: Never used   Substance and  Sexual Activity    Alcohol use: No    Drug use: No    Sexual activity: Not Currently     Partners: Male   Other Topics Concern    Not on file   Social History Narrative    Not on file     Social Determinants of Health     Financial Resource Strain: Not on file   Food Insecurity: Not on file   Transportation Needs: No Transportation Needs (2/17/2021)    PRAPARE - Transportation     Lack of Transportation (Medical): No     Lack of Transportation (Non-Medical): No   Physical Activity: Not on file   Stress: Not on file   Social Connections: Not on file   Intimate Partner Violence: Not on file   Housing Stability: Not on file       ROS: In review of the following systems: Constitutional, Eyes, ENT, Cardiovascular,Respiratory, GI, , Musculoskeletal, Skin, Neuro, Psych, Hematologic, Endocrine, Allergic; no pertinent findings were found related to the chief complaint and orthopedic injury     BP (!) 152/72   Pulse 72   Temp 36.4 °C (97.6 °F) (Temporal)   Resp 16   Wt 78 kg (171 lb 15.3 oz)   SpO2 97%     Physical Exam:  General: Well nourished, well developed, age appropriate appearance   HEENT: Normocephalic, atraumatic  Psych: Sleeping, agitated earlier  Neck: Not assessed  Chest/Pulmonary: breathing unlabored, no audible wheezing  Cardio: Regular heart rate and rhythm  Neuro: Sensation grossly intact to BUE and BLE, moving all four extremities  Skin: Intact with no full thickness abrasions or lacerations  MSK: left hip was not ranged due to the injury.  No swelling at the knee    Imaging and labs: X-ray of the left hip and pelvis from outside facility showed a greater trochanter fracture on the left hip that extended down towards the lesser trochanter.  The CT scan from today also was independently reviewed by myself and this shows the greater trochanter fracture involving the posterior cortex extending down towards the lesser with a nondisplaced fracture line    Recent Labs     03/05/24  1930   WBC 10.8   RBC  4.39   HEMOGLOBIN 13.1   HEMATOCRIT 40.0   MCV 91.1   MCH 29.8   RDW 43.4   PLATELETCT 413   MPV 9.3   NEUTSPOLYS 65.60   LYMPHOCYTES 17.90*   MONOCYTES 9.00   EOSINOPHILS 6.40   BASOPHILS 0.70       Assessment: Left greater trochanter fracture with intertrochanteric extension    I discussed the diagnosis and findings with the patient son at length.  I reviewed possible non operative and operative interventions and the risks and benefits of each of these.  I recommended intervention nail fixation as well out for earlier range of motion and weightbearing and decrease the risk of completion of this fracture.  Due to her dementia she would not be able to avoid weightbearing on this hip if this was treated nonoperatively.  He had a chance to ask questions and all of these were answered to his satisfaction.        Plan:  N.p.o.  Intramedullary nail fixation of the left hip fracture  Mobilize postoperatively  PT/OT  Discharge planning  [facility] out in 2 weeks

## 2024-03-07 NOTE — CARE PLAN
The patient is Watcher - Medium risk of patient condition declining or worsening    Shift Goals  Clinical Goals: safety  Patient Goals: josefa  Family Goals: pain management and safety    Progress made toward(s) clinical / shift goals:  pt monitored for safety with telesitter and bed alarms. Frequent rounding in place for safety.

## 2024-03-08 VITALS
WEIGHT: 171.96 LBS | TEMPERATURE: 97.1 F | RESPIRATION RATE: 20 BRPM | OXYGEN SATURATION: 93 % | SYSTOLIC BLOOD PRESSURE: 112 MMHG | BODY MASS INDEX: 33.76 KG/M2 | DIASTOLIC BLOOD PRESSURE: 72 MMHG | HEART RATE: 66 BPM | HEIGHT: 60 IN

## 2024-03-08 LAB
ANION GAP SERPL CALC-SCNC: 11 MMOL/L (ref 7–16)
BUN SERPL-MCNC: 35 MG/DL (ref 8–22)
CALCIUM SERPL-MCNC: 8.9 MG/DL (ref 8.5–10.5)
CHLORIDE SERPL-SCNC: 105 MMOL/L (ref 96–112)
CO2 SERPL-SCNC: 23 MMOL/L (ref 20–33)
CREAT SERPL-MCNC: 0.9 MG/DL (ref 0.5–1.4)
ERYTHROCYTE [DISTWIDTH] IN BLOOD BY AUTOMATED COUNT: 45.2 FL (ref 35.9–50)
GFR SERPLBLD CREATININE-BSD FMLA CKD-EPI: 62 ML/MIN/1.73 M 2
GLUCOSE SERPL-MCNC: 117 MG/DL (ref 65–99)
HCT VFR BLD AUTO: 35.7 % (ref 37–47)
HGB BLD-MCNC: 11.4 G/DL (ref 12–16)
MCH RBC QN AUTO: 30.2 PG (ref 27–33)
MCHC RBC AUTO-ENTMCNC: 31.9 G/DL (ref 32.2–35.5)
MCV RBC AUTO: 94.7 FL (ref 81.4–97.8)
PHOSPHATE SERPL-MCNC: 3.9 MG/DL (ref 2.5–4.5)
PLATELET # BLD AUTO: 308 K/UL (ref 164–446)
PMV BLD AUTO: 9.5 FL (ref 9–12.9)
POTASSIUM SERPL-SCNC: 4.3 MMOL/L (ref 3.6–5.5)
RBC # BLD AUTO: 3.77 M/UL (ref 4.2–5.4)
SODIUM SERPL-SCNC: 139 MMOL/L (ref 135–145)
WBC # BLD AUTO: 10.5 K/UL (ref 4.8–10.8)

## 2024-03-08 PROCEDURE — 700102 HCHG RX REV CODE 250 W/ 637 OVERRIDE(OP): Performed by: HOSPITALIST

## 2024-03-08 PROCEDURE — 99239 HOSP IP/OBS DSCHRG MGMT >30: CPT | Performed by: STUDENT IN AN ORGANIZED HEALTH CARE EDUCATION/TRAINING PROGRAM

## 2024-03-08 PROCEDURE — 85027 COMPLETE CBC AUTOMATED: CPT

## 2024-03-08 PROCEDURE — 700111 HCHG RX REV CODE 636 W/ 250 OVERRIDE (IP): Performed by: STUDENT IN AN ORGANIZED HEALTH CARE EDUCATION/TRAINING PROGRAM

## 2024-03-08 PROCEDURE — A9270 NON-COVERED ITEM OR SERVICE: HCPCS | Performed by: STUDENT IN AN ORGANIZED HEALTH CARE EDUCATION/TRAINING PROGRAM

## 2024-03-08 PROCEDURE — 80048 BASIC METABOLIC PNL TOTAL CA: CPT

## 2024-03-08 PROCEDURE — 51798 US URINE CAPACITY MEASURE: CPT

## 2024-03-08 PROCEDURE — A9270 NON-COVERED ITEM OR SERVICE: HCPCS | Performed by: HOSPITALIST

## 2024-03-08 PROCEDURE — 36415 COLL VENOUS BLD VENIPUNCTURE: CPT

## 2024-03-08 PROCEDURE — 84100 ASSAY OF PHOSPHORUS: CPT

## 2024-03-08 PROCEDURE — 700102 HCHG RX REV CODE 250 W/ 637 OVERRIDE(OP): Performed by: STUDENT IN AN ORGANIZED HEALTH CARE EDUCATION/TRAINING PROGRAM

## 2024-03-08 RX ORDER — OXYCODONE HYDROCHLORIDE 5 MG/1
5 TABLET ORAL EVERY 6 HOURS PRN
Qty: 12 TABLET | Refills: 0 | Status: SHIPPED | OUTPATIENT
Start: 2024-03-08 | End: 2024-03-11

## 2024-03-08 RX ORDER — TAMSULOSIN HYDROCHLORIDE 0.4 MG/1
0.4 CAPSULE ORAL
Status: SHIPPED
Start: 2024-03-08

## 2024-03-08 RX ORDER — TAMSULOSIN HYDROCHLORIDE 0.4 MG/1
0.4 CAPSULE ORAL
Status: DISCONTINUED | OUTPATIENT
Start: 2024-03-08 | End: 2024-03-08 | Stop reason: HOSPADM

## 2024-03-08 RX ORDER — ENOXAPARIN SODIUM 100 MG/ML
40 INJECTION SUBCUTANEOUS DAILY
Status: ACTIVE | DISCHARGE
Start: 2024-03-08

## 2024-03-08 RX ORDER — LORAZEPAM 2 MG/ML
0.5 INJECTION INTRAMUSCULAR
Status: COMPLETED | OUTPATIENT
Start: 2024-03-08 | End: 2024-03-08

## 2024-03-08 RX ADMIN — TAMSULOSIN HYDROCHLORIDE 0.4 MG: 0.4 CAPSULE ORAL at 13:53

## 2024-03-08 RX ADMIN — OMEPRAZOLE 40 MG: 20 CAPSULE, DELAYED RELEASE ORAL at 06:11

## 2024-03-08 RX ADMIN — CITALOPRAM HYDROBROMIDE 20 MG: 20 TABLET ORAL at 06:11

## 2024-03-08 RX ADMIN — LEVOTHYROXINE SODIUM 125 MCG: 0.12 TABLET ORAL at 06:11

## 2024-03-08 RX ADMIN — MEMANTINE HYDROCHLORIDE 10 MG: 10 TABLET ORAL at 06:12

## 2024-03-08 RX ADMIN — LORAZEPAM 0.5 MG: 2 INJECTION INTRAMUSCULAR; INTRAVENOUS at 13:53

## 2024-03-08 RX ADMIN — AMLODIPINE BESYLATE 10 MG: 10 TABLET ORAL at 06:15

## 2024-03-08 RX ADMIN — ACETAMINOPHEN 1000 MG: 500 TABLET ORAL at 06:11

## 2024-03-08 RX ADMIN — POLYETHYLENE GLYCOL 3350 1 PACKET: 17 POWDER, FOR SOLUTION ORAL at 06:15

## 2024-03-08 ASSESSMENT — PAIN DESCRIPTION - PAIN TYPE: TYPE: ACUTE PAIN

## 2024-03-08 NOTE — DISCHARGE PLANNING
Spoke with Beatriz at [facility], they will accept patient in transfer today. Per beatriz she has reviewed the MAR.  MIKI Carvajal advised.

## 2024-03-08 NOTE — CARE PLAN
The patient is Stable - Low risk of patient condition declining or worsening    Shift Goals  Clinical Goals: Monitor pt safety  Patient Goals: Discharge to care facility  Family Goals: pain management and safety  Patient is not progressing towards the following goals:      Problem: Knowledge Deficit - Standard  Goal: Patient and family/care givers will demonstrate understanding of plan of care, disease process/condition, diagnostic tests and medications  Description: Target End Date:  1-3 days or as soon as patient condition allows    Document in Patient Education    1.  Patient and family/caregiver oriented to unit, equipment, visitation policy and means for communicating concern  2.  Complete/review Learning Assessment  3.  Assess knowledge level of disease process/condition, treatment plan, diagnostic tests and medications  4.  Explain disease process/condition, treatment plan, diagnostic tests and medications  Outcome: Not Progressing  Note: Patient us unable to retain information about safety measures.

## 2024-03-08 NOTE — DISCHARGE PLANNING
DC Transport Scheduled    Transport Company Scheduled:  [facility]  Spoke with Susu at [facility] to schedule transport.    Scheduled Date: 3/8/2024  Scheduled Time: 1600    Destination: [facility]   Destination address: [address]    Notified care team of scheduled transport via [employer].     If there are any changes needed to the DC transportation scheduled, please contact [facility] at ext. [phone] between the hours of [phone] Mon-Fri. If outside those hours, contact the ED Case Manager at ext. [phone].

## 2024-03-08 NOTE — PROGRESS NOTES
Hospital Medicine Daily Progress Note    Date of Service  3/7/2024    Chief Complaint  MATTHIEU GONZALEZ is a 83 y.o. female admitted 3/5/2024 with left hip fracture    Hospital Course  MATTHIEU GONZALEZ is a 83 y.o. female who presented 3/5/2024 with a past medical history of hypertension, dementia, hypothyroidism, COPD who presents from [facility] assisted living for left leg pain.  At baseline she is only alert and oriented x 1.  she had a fall 1 week ago and was evaluated in ER without any significant findings. However she has more difficulties to walk and not able to put weight on.  She went back to ER X-ray of the left hip found nondisplaced fracture involving the greater trochanter of the left proximal femur.      A-fib not on OAC    Interval Problem Update  Patient seen and examined at bedside    S/p surgery with intramedullary device 3/6  PT OT recommended post acute    Phos 5.4    I have discussed this patient's plan of care and discharge plan at IDT rounds today with Case Management, Nursing, Nursing leadership, and other members of the IDT team.    Consultants/Specialty  Orthopedics    Code Status  DNAR/DNI    Disposition  The patient is not medically cleared for discharge to home or a post-acute facility.      I have placed the appropriate orders for post-discharge needs.    Review of Systems  Review of Systems   Unable to perform ROS: Mental status change        Physical Exam  Temp:  [35.9 °C (96.6 °F)-36.8 °C (98.2 °F)] 36.7 °C (98 °F)  Pulse:  [54-88] 54  Resp:  [16-23] 16  BP: ()/(45-67) 122/53  SpO2:  [92 %-98 %] 97 %    Physical Exam  Constitutional:       General: She is in acute distress.      Appearance: She is ill-appearing.   HENT:      Head: Normocephalic.      Mouth/Throat:      Mouth: Mucous membranes are moist.   Eyes:      Extraocular Movements: Extraocular movements intact.      Conjunctiva/sclera: Conjunctivae normal.   Cardiovascular:      Rate and Rhythm: Normal rate and regular  rhythm.      Pulses: Normal pulses.      Heart sounds: Normal heart sounds.   Pulmonary:      Effort: Pulmonary effort is normal.      Breath sounds: Normal breath sounds.   Abdominal:      General: Bowel sounds are normal.      Palpations: Abdomen is soft.      Tenderness: There is no abdominal tenderness. There is no guarding.   Musculoskeletal:         General: Tenderness present.      Cervical back: Normal range of motion and neck supple.   Skin:     General: Skin is warm.   Neurological:      Mental Status: She is alert. Mental status is at baseline. She is disoriented.         Fluids    Intake/Output Summary (Last 24 hours) at 3/7/2024 1619  Last data filed at 3/7/2024 0412  Gross per 24 hour   Intake 500 ml   Output 600 ml   Net -100 ml       Laboratory  Recent Labs     03/05/24  1930 03/07/24  0654   WBC 10.8 8.5   RBC 4.39 4.48   HEMOGLOBIN 13.1 13.3   HEMATOCRIT 40.0 41.1   MCV 91.1 91.7   MCH 29.8 29.7   MCHC 32.8 32.4   RDW 43.4 43.5   PLATELETCT 413 307   MPV 9.3 10.1     Recent Labs     03/05/24  1930 03/07/24  0654   SODIUM 141 138   POTASSIUM 4.0 5.3   CHLORIDE 105 103   CO2 22 22   GLUCOSE 110* 155*   BUN 11 22   CREATININE 0.42* 0.86   CALCIUM 9.3 9.3     Recent Labs     03/05/24  2334   APTT 31.1   INR 1.15*               Imaging  DX-PORTABLE FLUORO > 1 HOUR   Final Result      Portable fluoroscopy utilized for 38 seconds.         INTERPRETING LOCATION: [address] [facility], [address]      DX-HIP-UNILATERAL-W/O PELVIS-2/3 VIEWS LEFT   Final Result      Digitized intraoperative radiograph is submitted for review. This examination is not for diagnostic purpose but for guidance during a surgical procedure. Please see the patient's chart for full procedural details.         INTERPRETING LOCATION: [address] [facility], [address]      CT-HIP W/O PLUS RECONS LEFT   Final Result      1.  Comminuted fracture of left greater trochanter in near anatomic alignment.           Assessment/Plan  * Displaced  intertrochanteric fracture of left femur, initial encounter for closed fracture (HCC)  Assessment & Plan  After a fall that occurred 1 week ago    CT showed Comminuted fracture of left greater trochanter in near anatomic alignment.     S/p surgery with intramedullary device 3/6  PT OT recommended post acute        Hyperphosphatemia  Assessment & Plan  monitor    Chronic atrial fibrillation (HCC)- (present on admission)  Assessment & Plan  Rate controlled  Not on anticoagulation  Continue metoprolol    Alzheimer's dementia without behavioral disturbance (HCC)- (present on admission)  Assessment & Plan  AO x 1 as baseline   continue home medications  Frequent urination  Haldol as needed    Chronic obstructive pulmonary disease (HCC)- (present on admission)  Assessment & Plan  Continue home inhalers    Acquired hypothyroidism- (present on admission)  Assessment & Plan  Continue home Synthroid    Essential hypertension- (present on admission)  Assessment & Plan  controlled  Continue current home medications  IV as needed medications have been ordered      Encounter for preoperative assessment  Assessment & Plan  Admit to:    Orthopedic/Med-Surg floor since no major co-morbidities.     Cardiovascular:   Patient does not have history of CHF  Pre-op EKG: No     Pulmonary:  Oxygen per protocol  Incentive Spirometer    GI:   No history of cirrhosis. Standard bowel regimen. Hold for loose stools.    Renal:   IV fluids: No fluids - risk of fluid overload    Labs: Metabolic Panel every 6 hours for 24 hours for significant electrolyte derangements    Musculoskeletal:   Check 25 OH vitamin D level. If 31-40 pg/mL, consider starting vitamin D3 1000 IU PO daily. If 20-30 pg/mL, consider vitamin D3 2000 IU PO daily. If <20 pg/mL, vitamin D2 50,000 IU weekly x 8 weeks then 2000 IU PO daily.    Neurologic:   Pain Control: Neuro checks every 4 hours  Avoid fentanyl (short-acting)  Acetaminophen 1000 mg PO TID; 650 mg PO TID if liver  problems  Delirium or agitation: Age >70: quetiapine 12.5 mg PO x1 PRN agitation (can repeat x1 in 2 hours PRN agitation). Provider assessment completed prior to placing quetiapine orders.     Hematologic:  Plan on pharmacologic DVT prophylaxis post operative day #1. Hold for decreasing hemoglobin. Notify provider for hemoglobin less than 8.  Order preoperative type and cross.   Hgb every 6 hours if high bleeding risk.   If patient was on anticoagulation prior to arrival risks and benefits will be weighed by teams including surgery, hospitalist, geriatrics, and anesthesia for delaying surgery more than 24 hours.   On anticoagulation prior to arrival: No    Medical Assessment Risk:  Intermediate    Surgical Risk:   Intermediate           VTE prophylaxis: lovenox    I have performed a physical exam and reviewed and updated ROS and Plan today (3/7/2024). In review of yesterday's note (3/6/2024), there are no changes except as documented above.    I spent 36 minutes for chart review, meeting and examining the patient, documenting, ordering medications and tests, interpreting the results independently, and communicating with the other health professionals.

## 2024-03-08 NOTE — DISCHARGE SUMMARY
Discharge Summary    CHIEF COMPLAINT ON ADMISSION  No chief complaint on file.      Reason for Admission  Hip fracture    Admission Date  3/5/2024     CODE STATUS  DNAR/DNI    HPI & HOSPITAL COURSE  MATTHIEU GONZALEZ is a 83 y.o. female who presented 3/5/2024 with a past medical history of hypertension, A-fib not on OAC, dementia, hypothyroidism, COPD who presents from [facility] assisted [facility] for left leg pain.  At baseline she is only alert and oriented x 1.  she had a fall 1 week ago and was evaluated in ER without any significant findings. However she has more difficulties to walk and not able to put weight on.  She went back to ER X-ray of the left hip found nondisplaced fracture involving the greater trochanter of the left proximal femur.  CT showed Comminuted fracture of left greater trochanter in near anatomic alignment.  ortho consulted, S/p surgery with intramedullary device 3/6 with Dr. Zhang. PT OT recommended post acute. Patient is discharged to SNF.     Therefore, she is discharged in good and stable condition to skilled nursing facility.    The patient met 2-midnight criteria for an inpatient stay at the time of discharge.      FOLLOW UP ITEMS POST DISCHARGE  - Follow up with primary care physician in 1 week.   - Follow up with ortho as instructed  - Please take the medications as instructed    - Go to the local Emergency Department if you have any worsening condition.     DISCHARGE DIAGNOSES  Principal Problem:    Displaced intertrochanteric fracture of left femur, initial encounter for closed fracture (HCC) (POA: Unknown)  Active Problems:    Essential hypertension (POA: Yes)    Acquired hypothyroidism (Chronic) (POA: Yes)    Chronic obstructive pulmonary disease (HCC) (POA: Yes)    Alzheimer's dementia without behavioral disturbance (HCC) (POA: Yes)    Chronic atrial fibrillation (HCC) (POA: Yes)    Hyperphosphatemia (POA: Unknown)    Encounter for preoperative assessment (POA: Unknown)  Resolved  Problems:    Pathological fracture due to age-related osteoporosis, initial encounter (POA: Yes)      FOLLOW UP  No future appointments.  Orestes Warren M.D.  [address] [facility]  [address]  [place] [id number] [phone]    Follow up in 1 week(s)  Please call your primary care provider to schedule, recent hospitalization follow up      MEDICATIONS ON DISCHARGE     Medication List        START taking these medications        Instructions   enoxaparin 40 MG/0.4ML Sosy inj  Commonly known as: Lovenox   Inject 40 mg under the skin every day.  Dose: 40 mg     oxyCODONE immediate-release 5 MG Tabs  Commonly known as: Roxicodone   Take 1 Tablet by mouth every 6 hours as needed for Severe Pain for up to 3 days.  Dose: 5 mg            CHANGE how you take these medications        Instructions   TYLENOL 500 MG Tabs  What changed: Another medication with the same name was removed. Continue taking this medication, and follow the directions you see here.  Generic drug: acetaminophen   Take 500-1,000 mg by mouth every 8 hours as needed. Indications: Pain  Dose: 500-1,000 mg            CONTINUE taking these medications        Instructions   * albuterol 108 (90 Base) MCG/ACT Aers inhalation aerosol   Doctor's comments: Give albuterol that is patient or insurance preference please  Inhale 2 Puffs by mouth every four hours as needed for Shortness of Breath.  Dose: 2 Puff     * albuterol 2.5mg/3ml Nebu solution for nebulization  Commonly known as: Proventil   Take 3 mL by nebulization every four hours as needed for Shortness of Breath (audible wheezing or rattling).  Dose: 2.5 mg     amLODIPine 10 MG Tabs  Commonly known as: Norvasc   Take 1 Tablet by mouth every day.  Dose: 10 mg     citalopram 20 MG Tabs  Commonly known as: CeleXA   TAKE 1 TABLET BY MOUTH EVERY DAY  Dose: 20 mg     folic acid 1 MG Tabs  Commonly known as: Folvite   TAKE 1 TAB BY MOUTH DAILY FOR SUPPLEMENT     hydrOXYzine HCl 25 MG Tabs  Commonly known as: Atarax    Take 1 Tablet by mouth 2 times a day.  Dose: 25 mg     levothyroxine 125 MCG Tabs  Commonly known as: Synthroid   Take 1 Tablet by mouth every morning on an empty stomach.  Dose: 125 mcg     lidocaine 5 % Ptch  Commonly known as: Lidoderm   Place 1 Patch on the skin every 24 hours. Put one patch on RT side for rib pain @0700, remove patch @1900.  Dose: 1 Patch     memantine 10 MG Tabs  Commonly known as: Namenda   Take 1 Tablet by mouth 2 times a day. At 8 and 1700  Dose: 10 mg     metoprolol  MG Tb24  Commonly known as: Toprol XL   Take 1 Tablet by mouth every day.  Dose: 100 mg     mirtazapine 15 MG Tabs  Commonly known as: Remeron   TAKE 1 TAB BY MOUTH AT NIGHT FOR DEPRESSION AND SLEEP     omeprazole 40 MG delayed-release capsule  Commonly known as: PriLOSEC   TAKE 1 CAPSULE BY MOUTH EVERY DAY  Dose: 40 mg     potassium chloride ER 10 MEQ tablet  Commonly known as: Klor-Con   Take 1 Tablet by mouth every day.  Dose: 10 mEq     pravastatin 40 MG tablet  Commonly known as: Pravachol   TAKE 1 TABLET BY MOUTH EVERY DAY     Trelegy Ellipta 100-62.5-25 MCG/INH inhaler  Generic drug: fluticasone-umeclidinium-vilanterol   Inhale 1 Inhalation every day.  Dose: 1 Inhalation      triamcinolone acetonide 0.1 % Crea  Commonly known as: Kenalog   Apply  topically 2 times a day. Apply thin layer twice daily.     vitamin D3 125 MCG (5000 UT) Caps  Generic drug: cholecalciferol   Take 1 Capsule by mouth every day.  Dose: 1 Capsule           * This list has 2 medication(s) that are the same as other medications prescribed for you. Read the directions carefully, and ask your doctor or other care provider to review them with you.                  Allergies  No Known Allergies    DIET  Orders Placed This Encounter   Procedures    Diet Order Diet: Low Fiber(GI Soft)     Standing Status:   Standing     Number of Occurrences:   1     Order Specific Question:   Diet:     Answer:   Low Fiber(GI Soft) [2]       ACTIVITY  As  tolerated.  Weight bearing as tolerated    LINES, DRAINS, AND WOUNDS  This is an automated list. Peripheral IVs will be removed prior to discharge.  Peripheral IV 03/05/24 20 G Anterior;Proximal;Right Forearm (Active)   Site Assessment Clean;Dry;Intact 03/07/24 2000   Dressing Type Occlusive;Transparent 03/07/24 2000   Line Status Saline locked;Scrubbed the hub prior to access;No blood return;Flushed 03/07/24 2000   Dressing Status Clean;Dry;Intact 03/07/24 2000   Dressing Intervention N/A 03/07/24 2000   Dressing Change Due 03/09/24 03/07/24 2000   Date IV Connector(s) Changed 03/05/24 03/07/24 2000   Infiltration Grading ([facility]) 0 03/07/24 2000   Phlebitis Scale ([facility]) 0 03/07/24 2000       Wound 03/06/24 Incision Hip Left Xeroform, 4x4 gauze, tegaderm (Active)   Site Assessment BETZAIDA 03/07/24 2000   Periwound Assessment Clean;Dry;Intact 03/06/24 2309   Dressing Status Clean;Intact;Dry 03/07/24 2000       Peripheral IV 03/05/24 20 G Anterior;Proximal;Right Forearm (Active)   Site Assessment Clean;Dry;Intact 03/07/24 2000   Dressing Type Occlusive;Transparent 03/07/24 2000   Line Status Saline locked;Scrubbed the hub prior to access;No blood return;Flushed 03/07/24 2000   Dressing Status Clean;Dry;Intact 03/07/24 2000   Dressing Intervention N/A 03/07/24 2000   Dressing Change Due 03/09/24 03/07/24 2000   Date IV Connector(s) Changed 03/05/24 03/07/24 2000   Infiltration Grading ([facility], [facility]) 0 03/07/24 2000   Phlebitis Scale ([facility] Only) 0 03/07/24 2000               MENTAL STATUS ON TRANSFER             CONSULTATIONS  ortho    PROCEDURES  S/p surgery with intramedullary device 3/6     LABORATORY  Lab Results   Component Value Date    SODIUM 139 03/08/2024    POTASSIUM 4.3 03/08/2024    CHLORIDE 105 03/08/2024    CO2 23 03/08/2024    GLUCOSE 117 (H) 03/08/2024    BUN 35 (H) 03/08/2024    CREATININE 0.90 03/08/2024        Lab Results   Component Value Date    WBC 10.5 03/08/2024    HEMOGLOBIN 11.4 (L)  03/08/2024    HEMATOCRIT 35.7 (L) 03/08/2024    PLATELETCT 308 03/08/2024        Total time of the discharge process exceeds 32 minutes.

## 2024-03-08 NOTE — DISCHARGE PLANNING
Case Management Discharge Planning    Admission Date: 3/5/2024  GMLOS: 3.2  ALOS: 3    6-Clicks ADL Score: 14  6-Clicks Mobility Score: 8  PT and/or OT Eval ordered: Yes  Post-acute Referrals Ordered: Yes  Post-acute Choice Obtained: Yes  Has referral(s) been sent to post-acute provider:  Yes      Anticipated Discharge Dispo: Discharge Disposition: D/T to SNF with Medicare cert in anticipation of skilled care (03)    DME Needed: No    Action(s) Taken: Pt accepted at [facility], they have reviewed MAR and have no concerns regarding medications. Transport request sent to [facility], pending confirmation. COBRA packet complete and given to RN. IMM completed, no other CM needs at this time.     Escalations Completed: None    Medically Clear: Yes    Next Steps: Pending transportation confirmation.    Barriers to Discharge: None    Care Transition Team Assessment    Information Source  Orientation Level: Oriented to person, Disoriented to place, Disoriented to time, Disoriented to situation  Information Given By: Relative  Informant's Name: Devon  Who is responsible for making decisions for patient? : POA    Readmission Evaluation  Is this a readmission?: No    Elopement Risk  Legal Hold: No  Ambulatory or Self Mobile in Wheelchair: No-Not an Elopement Risk  Disoriented: Place-At Risk for Elopement  Psychiatric Symptoms: Impulsivity-at Risk for Elopement  History of Wandering: No  Elopement this Admit: No  Vocalizing Wanting to Leave: No  Displays Behaviors, Body Language Wanting to Leave: No-Not at Risk for Elopement  Elopement Risk: Not at Risk for Elopement    Interdisciplinary Discharge Planning  Lives with - Patient's Self Care Capacity: (P) Attendant / Paid Care Giver  Support Systems: (P) Home Care Staff  Housing / Facility: (P) [facility] ([facility])  Name of Care Facility: (P) [facility]  Prior Services: Continuous (24 Hour) Care Giving Per Service  Patient Prefers to be Discharged to:: (P) Back to [facility] after rehab  Durable Medical Equipment: (P) Walker    Discharge Preparedness  What is your plan after discharge?: Skilled nursing facility  What are your discharge supports?: Child  Prior Functional Level: Needs Assist with Activities of Daily Living, Needs Assist with Medication Management  Difficulity with ADLs: Bathing, Brushing teeth, Dressing, Eating, Toileting, Walking  Difficulity with IADLs: Keeping track of finances, Using the telephone or computer, Shopping, Managing medication, Laundry, Cooking, Driving    Functional Assesment  Prior Functional Level: Needs Assist with Activities of Daily Living, Needs Assist with Medication Management    Finances  Financial Barriers to Discharge: No  Prescription Coverage: Yes    Vision / Hearing Impairment  Vision Impairment : Yes  Hearing Impairment : Yes  Hearing Impairment: Both Ears, Hearing Device Not Available         Advance Directive  Advance Directive?: DPOA for Health Care    Domestic Abuse  Have you ever been the victim of abuse or violence?: No  Physical Abuse or Sexual Abuse: No  Verbal Abuse or Emotional Abuse: No  Possible Abuse/Neglect Reported to:: Not Applicable    Psychological Assessment  History of Substance Abuse: None  History of Psychiatric Problems: No  Non-compliant with Treatment: No  Newly Diagnosed Illness: No    Discharge Risks or Barriers  Discharge risks or barriers?: Complex medical needs  Patient risk factors: Complex medical needs    Anticipated Discharge Information  Discharge Disposition: D/T to SNF with Medicare cert in anticipation of skilled care (03)

## 2024-03-08 NOTE — DISCHARGE INSTRUCTIONS
- Follow up with primary care physician in 1 week.   - Follow up with ortho as instructed  - Please take the medications as instructed    - Go to the local Emergency Department if you have any worsening condition.

## 2024-03-08 NOTE — DISCHARGE PLANNING
TCN following. HTH/SCP chart reviewed. No new TCN needs identified. Please see prior TCN note from 3/8 for most recent discharge planning considerations if indicated. Discussed with CM. Note pt was initially anticipated for dc to [facility], though concerns with medications and transport was cancelled. Pt has additional accepting facilities and note plan is now for dc to [facility] today. Noted pt has acceptance and auth for dc to [facility] as well per review.     Completed:  PT/OT with recommendations for post acute placement on 3/7  Choice obtained: IRF, HH; son aware of  blanket referral policy; note that pt currently to dc to [facility] today

## (undated) DEVICE — KIT ANESTHESIA W/CIRCUIT & 3/LT BAG W/FILTER (20EA/CA)

## (undated) DEVICE — PACK MAJOR ORTHO - (2EA/CA)

## (undated) DEVICE — CANISTER SUCTION 3000ML MECHANICAL FILTER AUTO SHUTOFF MEDI-VAC NONSTERILE LF DISP  (40EA/CA)

## (undated) DEVICE — SENSOR SPO2 NEO LNCS ADHESIVE (20/BX) SEE USER NOTES

## (undated) DEVICE — SET LEADWIRE 5 LEAD BEDSIDE DISPOSABLE ECG (1SET OF 5/EA)

## (undated) DEVICE — DRESSING TRANSPARENT FILM TEGADERM 4 X 4.75" (50EA/BX)"

## (undated) DEVICE — GOWN WARMING STANDARD FLEX - (30/CA)

## (undated) DEVICE — BLADE SURGICAL #15 - (50/BX 3BX/CA)

## (undated) DEVICE — PENCIL ELECTSURG 10FT BTN SWH - (50/CA)

## (undated) DEVICE — SODIUM CHL IRRIGATION 0.9% 1000ML (12EA/CA)

## (undated) DEVICE — TUBING CLEARLINK DUO-VENT - C-FLO (48EA/CA)

## (undated) DEVICE — BIT DRILL LONG CALIBRATED 4.2MM X 330MM (4TX2=8)

## (undated) DEVICE — SUTURE 0 VICRYL PLUS CT-1 - 36 INCH (36/BX)

## (undated) DEVICE — SUCTION INSTRUMENT YANKAUER BULBOUS TIP W/O VENT (50EA/CA)

## (undated) DEVICE — BAG SPONGE COUNT 10.25 X 32 - BLUE (250/CA)

## (undated) DEVICE — PROTECTOR ULNA NERVE - (36PR/CA)

## (undated) DEVICE — SUTURE GENERAL

## (undated) DEVICE — CHLORAPREP 26 ML APPLICATOR - ORANGE TINT(25/CA)

## (undated) DEVICE — SENSOR OXIMETER ADULT SPO2 RD SET (20EA/BX)

## (undated) DEVICE — DRAPE 36X28IN RAD CARM BND BG - (25/CA) O

## (undated) DEVICE — SUCTION INSTRUMENT YANKAUER OPEN TIP W/O VENT (50EA/CA)

## (undated) DEVICE — SUTURE 4-0 VICRYL PLUS TF - 8 X 18 INCH (12/BX)

## (undated) DEVICE — DRAPE SURGICAL U 77X120 - (10/CA)

## (undated) DEVICE — KIT ROOM DECONTAMINATION

## (undated) DEVICE — GOWN SURGEONS LARGE - (32/CA)

## (undated) DEVICE — MASK ANESTHESIA ADULT  - (100/CA)

## (undated) DEVICE — SUTURE 5-0 VICRYL PLUS P-3 18 (36PK/BX)"

## (undated) DEVICE — HEAD HOLDER JUNIOR/ADULT

## (undated) DEVICE — COVER LIGHT HANDLE ALC PLUS DISP (18EA/BX)

## (undated) DEVICE — SET EXTENSION WITH 2 PORTS (48EA/CA) ***PART #2C8610 IS A SUBSTITUTE*****

## (undated) DEVICE — DRAPE IOBAN LARGE 2 INCISE FILM (5EA/CA)

## (undated) DEVICE — SUTURE 4-0 SILK 12 X 18 INCH - (36/BX)

## (undated) DEVICE — GLOVE BIOGEL SZ 7 SURGICAL PF LTX - (50PR/BX 4BX/CA)

## (undated) DEVICE — ELECTRODE DUAL RETURN W/ CORD - (50/PK)

## (undated) DEVICE — LACTATED RINGERS INJ 1000 ML - (14EA/CA 60CA/PF)

## (undated) DEVICE — PACK MINOR BASIN - (2EA/CA)

## (undated) DEVICE — SUTURE 2-0 VICRYL PLUS CT-1 - 8 X 18 INCH(12/BX)

## (undated) DEVICE — ELECTRODE 850 FOAM ADHESIVE - HYDROGEL RADIOTRNSPRNT (50/PK)

## (undated) DEVICE — TUBE E-T HI-LO CUFF 7.5MM (10EA/PK)

## (undated) DEVICE — CLIP SM INTNL HRZN TI ESCP LGT - (24EA/PK 25PK/BX)

## (undated) DEVICE — ROD GUIDE BALL TIP 3.0MM X 1000MM

## (undated) DEVICE — DERMABOND ADVANCED - (12EA/BX)

## (undated) DEVICE — SOD. CHL. INJ. 0.9% 250 ML - (36/CA 50CA/PF)

## (undated) DEVICE — GUIDE PIN CALIBRATED (5EA/PK) (4TX6=24)

## (undated) DEVICE — NEPTUNE 4 PORT MANIFOLD - (20/PK)

## (undated) DEVICE — STAPLER 35MM SKIN WIDE ROTATING HEAD (6EA/BX)

## (undated) DEVICE — SYRINGE EAR/NOSE 3 OZ STERILE (50/CA

## (undated) DEVICE — GLOVE BIOGEL SZ 8 SURGICAL PF LTX - (50PR/BX 4BX/CA)

## (undated) DEVICE — GLOVE BIOGEL INDICATOR SZ 7.5 SURGICAL PF LTX - (50PR/BX 4BX/CA)

## (undated) DEVICE — SLEEVE, VASO, THIGH, MED